# Patient Record
Sex: MALE | Race: WHITE | Employment: OTHER | URBAN - METROPOLITAN AREA
[De-identification: names, ages, dates, MRNs, and addresses within clinical notes are randomized per-mention and may not be internally consistent; named-entity substitution may affect disease eponyms.]

---

## 2017-01-10 ENCOUNTER — TELEPHONE (OUTPATIENT)
Dept: FAMILY MEDICINE CLINIC | Age: 82
End: 2017-01-10

## 2017-01-10 PROBLEM — R04.0 EPISTAXIS: Status: ACTIVE | Noted: 2017-01-10

## 2017-01-11 ENCOUNTER — TELEPHONE (OUTPATIENT)
Dept: FAMILY MEDICINE CLINIC | Age: 82
End: 2017-01-11

## 2017-01-12 ENCOUNTER — OFFICE VISIT (OUTPATIENT)
Dept: FAMILY MEDICINE CLINIC | Age: 82
End: 2017-01-12

## 2017-01-12 VITALS
DIASTOLIC BLOOD PRESSURE: 68 MMHG | SYSTOLIC BLOOD PRESSURE: 124 MMHG | BODY MASS INDEX: 21.22 KG/M2 | HEIGHT: 70 IN | RESPIRATION RATE: 16 BRPM | HEART RATE: 62 BPM | WEIGHT: 148.2 LBS

## 2017-01-12 DIAGNOSIS — I10 ESSENTIAL HYPERTENSION: ICD-10-CM

## 2017-01-12 DIAGNOSIS — R04.0 EPISTAXIS: ICD-10-CM

## 2017-01-12 DIAGNOSIS — J40 BRONCHITIS: Primary | ICD-10-CM

## 2017-01-12 DIAGNOSIS — I48.0 PAF (PAROXYSMAL ATRIAL FIBRILLATION) (HCC): ICD-10-CM

## 2017-01-12 PROCEDURE — 99214 OFFICE O/P EST MOD 30 MIN: CPT | Performed by: EMERGENCY MEDICINE

## 2017-01-12 ASSESSMENT — ENCOUNTER SYMPTOMS
TROUBLE SWALLOWING: 0
ABDOMINAL PAIN: 0
COUGH: 1
VOMITING: 0
CHEST TIGHTNESS: 0
CONSTIPATION: 0
RHINORRHEA: 0
WHEEZING: 0
NAUSEA: 0
SORE THROAT: 0
SINUS PRESSURE: 0
DIARRHEA: 0
SHORTNESS OF BREATH: 0
VOICE CHANGE: 0
BACK PAIN: 0

## 2017-01-13 ENCOUNTER — OFFICE VISIT (OUTPATIENT)
Dept: OTOLARYNGOLOGY | Age: 82
End: 2017-01-13

## 2017-01-13 VITALS
RESPIRATION RATE: 16 BRPM | HEART RATE: 84 BPM | SYSTOLIC BLOOD PRESSURE: 130 MMHG | BODY MASS INDEX: 21.32 KG/M2 | HEIGHT: 70 IN | TEMPERATURE: 97.6 F | DIASTOLIC BLOOD PRESSURE: 56 MMHG | WEIGHT: 148.9 LBS

## 2017-01-13 DIAGNOSIS — Z79.01 ANTICOAGULANT LONG-TERM USE: ICD-10-CM

## 2017-01-13 DIAGNOSIS — R04.0 EPISTAXIS: Primary | ICD-10-CM

## 2017-01-13 PROCEDURE — 1036F TOBACCO NON-USER: CPT | Performed by: PHYSICIAN ASSISTANT

## 2017-01-13 PROCEDURE — G8427 DOCREV CUR MEDS BY ELIG CLIN: HCPCS | Performed by: PHYSICIAN ASSISTANT

## 2017-01-13 PROCEDURE — 1123F ACP DISCUSS/DSCN MKR DOCD: CPT | Performed by: PHYSICIAN ASSISTANT

## 2017-01-13 PROCEDURE — 30901 CONTROL OF NOSEBLEED: CPT | Performed by: PHYSICIAN ASSISTANT

## 2017-01-13 PROCEDURE — G8419 CALC BMI OUT NRM PARAM NOF/U: HCPCS | Performed by: PHYSICIAN ASSISTANT

## 2017-01-13 PROCEDURE — 99214 OFFICE O/P EST MOD 30 MIN: CPT | Performed by: PHYSICIAN ASSISTANT

## 2017-01-13 PROCEDURE — 4040F PNEUMOC VAC/ADMIN/RCVD: CPT | Performed by: PHYSICIAN ASSISTANT

## 2017-01-13 PROCEDURE — G8484 FLU IMMUNIZE NO ADMIN: HCPCS | Performed by: PHYSICIAN ASSISTANT

## 2017-01-13 PROCEDURE — G8598 ASA/ANTIPLAT THER USED: HCPCS | Performed by: PHYSICIAN ASSISTANT

## 2017-01-13 ASSESSMENT — ENCOUNTER SYMPTOMS
COUGH: 0
TROUBLE SWALLOWING: 0
ABDOMINAL PAIN: 0
EYE DISCHARGE: 0
STRIDOR: 0
ANAL BLEEDING: 0
COLOR CHANGE: 0
SINUS PRESSURE: 0
APNEA: 0
BLOOD IN STOOL: 0
NAUSEA: 0
BACK PAIN: 0
DIARRHEA: 0
RHINORRHEA: 0
PHOTOPHOBIA: 0
EYE REDNESS: 0
SHORTNESS OF BREATH: 0
CHEST TIGHTNESS: 0
RECTAL PAIN: 0
EYE PAIN: 0
CHOKING: 0
WHEEZING: 0
SORE THROAT: 0
ABDOMINAL DISTENTION: 0
VOMITING: 0
FACIAL SWELLING: 0
EYE ITCHING: 0
CONSTIPATION: 0
VOICE CHANGE: 0

## 2017-01-27 ENCOUNTER — OFFICE VISIT (OUTPATIENT)
Dept: OTOLARYNGOLOGY | Age: 82
End: 2017-01-27

## 2017-01-27 VITALS
BODY MASS INDEX: 22.28 KG/M2 | HEART RATE: 78 BPM | RESPIRATION RATE: 16 BRPM | WEIGHT: 155.6 LBS | DIASTOLIC BLOOD PRESSURE: 68 MMHG | TEMPERATURE: 97.6 F | SYSTOLIC BLOOD PRESSURE: 116 MMHG

## 2017-01-27 DIAGNOSIS — R04.0 EPISTAXIS: Primary | ICD-10-CM

## 2017-01-27 DIAGNOSIS — Z79.01 ANTICOAGULANT LONG-TERM USE: ICD-10-CM

## 2017-01-27 PROCEDURE — 1123F ACP DISCUSS/DSCN MKR DOCD: CPT | Performed by: PHYSICIAN ASSISTANT

## 2017-01-27 PROCEDURE — G8598 ASA/ANTIPLAT THER USED: HCPCS | Performed by: PHYSICIAN ASSISTANT

## 2017-01-27 PROCEDURE — G8484 FLU IMMUNIZE NO ADMIN: HCPCS | Performed by: PHYSICIAN ASSISTANT

## 2017-01-27 PROCEDURE — 1036F TOBACCO NON-USER: CPT | Performed by: PHYSICIAN ASSISTANT

## 2017-01-27 PROCEDURE — 99213 OFFICE O/P EST LOW 20 MIN: CPT | Performed by: PHYSICIAN ASSISTANT

## 2017-01-27 PROCEDURE — 4040F PNEUMOC VAC/ADMIN/RCVD: CPT | Performed by: PHYSICIAN ASSISTANT

## 2017-01-27 PROCEDURE — G8419 CALC BMI OUT NRM PARAM NOF/U: HCPCS | Performed by: PHYSICIAN ASSISTANT

## 2017-01-27 PROCEDURE — G8427 DOCREV CUR MEDS BY ELIG CLIN: HCPCS | Performed by: PHYSICIAN ASSISTANT

## 2017-01-27 RX ORDER — SODIUM CHLORIDE/ALOE VERA
GEL (GRAM) NASAL PRN
Qty: 1 TUBE | Refills: 3 | Status: SHIPPED | OUTPATIENT
Start: 2017-01-27 | End: 2017-10-09 | Stop reason: SDUPTHER

## 2017-01-27 ASSESSMENT — ENCOUNTER SYMPTOMS
ANAL BLEEDING: 0
EYE PAIN: 0
NAUSEA: 0
BACK PAIN: 0
VOICE CHANGE: 0
EYE ITCHING: 0
FACIAL SWELLING: 0
DIARRHEA: 0
EYE REDNESS: 0
EYE DISCHARGE: 0
RHINORRHEA: 0
BLOOD IN STOOL: 0
COLOR CHANGE: 0
WHEEZING: 0
SINUS PRESSURE: 0
TROUBLE SWALLOWING: 0
VOMITING: 0
CHOKING: 0
STRIDOR: 0
ABDOMINAL PAIN: 0
SHORTNESS OF BREATH: 0
CHEST TIGHTNESS: 0
ABDOMINAL DISTENTION: 0
APNEA: 0
COUGH: 0
PHOTOPHOBIA: 0
SORE THROAT: 0
CONSTIPATION: 0
RECTAL PAIN: 0

## 2017-01-30 RX ORDER — NIFEDIPINE 90 MG/1
TABLET, EXTENDED RELEASE ORAL
Qty: 90 TABLET | Refills: 1 | Status: SHIPPED | OUTPATIENT
Start: 2017-01-30 | End: 2017-07-31 | Stop reason: SDUPTHER

## 2017-01-31 ENCOUNTER — OFFICE VISIT (OUTPATIENT)
Dept: FAMILY MEDICINE CLINIC | Age: 82
End: 2017-01-31

## 2017-01-31 VITALS
HEART RATE: 68 BPM | SYSTOLIC BLOOD PRESSURE: 138 MMHG | BODY MASS INDEX: 22.1 KG/M2 | DIASTOLIC BLOOD PRESSURE: 70 MMHG | WEIGHT: 154.38 LBS | HEIGHT: 70 IN | RESPIRATION RATE: 14 BRPM

## 2017-01-31 DIAGNOSIS — E78.5 HYPERLIPIDEMIA, UNSPECIFIED HYPERLIPIDEMIA TYPE: ICD-10-CM

## 2017-01-31 DIAGNOSIS — I10 ESSENTIAL HYPERTENSION: ICD-10-CM

## 2017-01-31 DIAGNOSIS — E08.22 DIABETES MELLITUS DUE TO UNDERLYING CONDITION WITH STAGE 4 CHRONIC KIDNEY DISEASE, WITHOUT LONG-TERM CURRENT USE OF INSULIN (HCC): Primary | ICD-10-CM

## 2017-01-31 DIAGNOSIS — N18.4 CKD (CHRONIC KIDNEY DISEASE) STAGE 4, GFR 15-29 ML/MIN (HCC): ICD-10-CM

## 2017-01-31 DIAGNOSIS — D64.9 ANEMIA, UNSPECIFIED TYPE: ICD-10-CM

## 2017-01-31 DIAGNOSIS — N18.4 DIABETES MELLITUS DUE TO UNDERLYING CONDITION WITH STAGE 4 CHRONIC KIDNEY DISEASE, WITHOUT LONG-TERM CURRENT USE OF INSULIN (HCC): Primary | ICD-10-CM

## 2017-01-31 LAB
GLUCOSE BLD-MCNC: 116 MG/DL
HBA1C MFR BLD: 6.1 %

## 2017-01-31 PROCEDURE — 82962 GLUCOSE BLOOD TEST: CPT | Performed by: EMERGENCY MEDICINE

## 2017-01-31 PROCEDURE — 99213 OFFICE O/P EST LOW 20 MIN: CPT | Performed by: EMERGENCY MEDICINE

## 2017-01-31 PROCEDURE — 83036 HEMOGLOBIN GLYCOSYLATED A1C: CPT | Performed by: EMERGENCY MEDICINE

## 2017-01-31 ASSESSMENT — ENCOUNTER SYMPTOMS
SORE THROAT: 0
WHEEZING: 0
BACK PAIN: 0
SINUS PRESSURE: 0
TROUBLE SWALLOWING: 0
VOICE CHANGE: 0
DIARRHEA: 0
COUGH: 0
CONSTIPATION: 0
CHEST TIGHTNESS: 0
RHINORRHEA: 0
SHORTNESS OF BREATH: 0
VOMITING: 0
NAUSEA: 0
ABDOMINAL PAIN: 0

## 2017-03-20 RX ORDER — CLONIDINE HYDROCHLORIDE 0.3 MG/1
0.3 TABLET ORAL 3 TIMES DAILY
Qty: 270 TABLET | Refills: 1 | Status: SHIPPED | OUTPATIENT
Start: 2017-03-20 | End: 2017-10-12 | Stop reason: SDUPTHER

## 2017-03-30 ENCOUNTER — OFFICE VISIT (OUTPATIENT)
Dept: CARDIOLOGY | Age: 82
End: 2017-03-30

## 2017-03-30 VITALS
DIASTOLIC BLOOD PRESSURE: 52 MMHG | WEIGHT: 153 LBS | BODY MASS INDEX: 21.9 KG/M2 | HEART RATE: 68 BPM | HEIGHT: 70 IN | SYSTOLIC BLOOD PRESSURE: 132 MMHG

## 2017-03-30 DIAGNOSIS — I48.0 PAF (PAROXYSMAL ATRIAL FIBRILLATION) (HCC): Primary | ICD-10-CM

## 2017-03-30 DIAGNOSIS — I10 ESSENTIAL HYPERTENSION: ICD-10-CM

## 2017-03-30 PROCEDURE — 99213 OFFICE O/P EST LOW 20 MIN: CPT | Performed by: INTERNAL MEDICINE

## 2017-03-30 PROCEDURE — 4040F PNEUMOC VAC/ADMIN/RCVD: CPT | Performed by: INTERNAL MEDICINE

## 2017-03-30 PROCEDURE — 1123F ACP DISCUSS/DSCN MKR DOCD: CPT | Performed by: INTERNAL MEDICINE

## 2017-03-30 PROCEDURE — 1036F TOBACCO NON-USER: CPT | Performed by: INTERNAL MEDICINE

## 2017-03-30 PROCEDURE — G8598 ASA/ANTIPLAT THER USED: HCPCS | Performed by: INTERNAL MEDICINE

## 2017-03-30 PROCEDURE — G8484 FLU IMMUNIZE NO ADMIN: HCPCS | Performed by: INTERNAL MEDICINE

## 2017-03-30 PROCEDURE — G8419 CALC BMI OUT NRM PARAM NOF/U: HCPCS | Performed by: INTERNAL MEDICINE

## 2017-03-30 PROCEDURE — G8427 DOCREV CUR MEDS BY ELIG CLIN: HCPCS | Performed by: INTERNAL MEDICINE

## 2017-04-06 ENCOUNTER — OFFICE VISIT (OUTPATIENT)
Dept: NEPHROLOGY | Age: 82
End: 2017-04-06

## 2017-04-06 VITALS
DIASTOLIC BLOOD PRESSURE: 70 MMHG | RESPIRATION RATE: 18 BRPM | BODY MASS INDEX: 22.33 KG/M2 | SYSTOLIC BLOOD PRESSURE: 164 MMHG | HEART RATE: 74 BPM | WEIGHT: 156 LBS

## 2017-04-06 DIAGNOSIS — E55.9 VITAMIN D DEFICIENCY: ICD-10-CM

## 2017-04-06 DIAGNOSIS — N18.4 CKD (CHRONIC KIDNEY DISEASE) STAGE 4, GFR 15-29 ML/MIN (HCC): Primary | ICD-10-CM

## 2017-04-06 DIAGNOSIS — N18.4 DIABETES MELLITUS DUE TO UNDERLYING CONDITION WITH STAGE 4 CHRONIC KIDNEY DISEASE, UNSPECIFIED LONG TERM INSULIN USE STATUS: ICD-10-CM

## 2017-04-06 DIAGNOSIS — E08.22 DIABETES MELLITUS DUE TO UNDERLYING CONDITION WITH STAGE 4 CHRONIC KIDNEY DISEASE, UNSPECIFIED LONG TERM INSULIN USE STATUS: ICD-10-CM

## 2017-04-06 DIAGNOSIS — I10 ESSENTIAL HYPERTENSION: ICD-10-CM

## 2017-04-06 DIAGNOSIS — E87.20 METABOLIC ACIDOSIS: ICD-10-CM

## 2017-04-06 PROCEDURE — 1036F TOBACCO NON-USER: CPT | Performed by: INTERNAL MEDICINE

## 2017-04-06 PROCEDURE — 4040F PNEUMOC VAC/ADMIN/RCVD: CPT | Performed by: INTERNAL MEDICINE

## 2017-04-06 PROCEDURE — 99214 OFFICE O/P EST MOD 30 MIN: CPT | Performed by: INTERNAL MEDICINE

## 2017-04-06 PROCEDURE — G8427 DOCREV CUR MEDS BY ELIG CLIN: HCPCS | Performed by: INTERNAL MEDICINE

## 2017-04-06 PROCEDURE — G8419 CALC BMI OUT NRM PARAM NOF/U: HCPCS | Performed by: INTERNAL MEDICINE

## 2017-04-06 PROCEDURE — G8598 ASA/ANTIPLAT THER USED: HCPCS | Performed by: INTERNAL MEDICINE

## 2017-04-06 PROCEDURE — 1123F ACP DISCUSS/DSCN MKR DOCD: CPT | Performed by: INTERNAL MEDICINE

## 2017-04-06 RX ORDER — SODIUM BICARBONATE 650 MG/1
650 TABLET ORAL 2 TIMES DAILY
Qty: 60 TABLET | Refills: 5 | Status: SHIPPED | OUTPATIENT
Start: 2017-04-06 | End: 2017-11-09 | Stop reason: ALTCHOICE

## 2017-04-10 RX ORDER — METOPROLOL SUCCINATE 100 MG/1
50 TABLET, EXTENDED RELEASE ORAL DAILY PRN
Qty: 45 TABLET | Refills: 2 | Status: SHIPPED | OUTPATIENT
Start: 2017-04-10 | End: 2017-10-12 | Stop reason: SDUPTHER

## 2017-04-10 RX ORDER — METOPROLOL SUCCINATE 100 MG/1
TABLET, EXTENDED RELEASE ORAL
Qty: 90 TABLET | Refills: 0 | OUTPATIENT
Start: 2017-04-10

## 2017-05-02 ENCOUNTER — OFFICE VISIT (OUTPATIENT)
Dept: FAMILY MEDICINE CLINIC | Age: 82
End: 2017-05-02

## 2017-05-02 VITALS
RESPIRATION RATE: 14 BRPM | WEIGHT: 155.8 LBS | HEIGHT: 70 IN | BODY MASS INDEX: 22.3 KG/M2 | DIASTOLIC BLOOD PRESSURE: 70 MMHG | SYSTOLIC BLOOD PRESSURE: 130 MMHG | HEART RATE: 46 BPM

## 2017-05-02 DIAGNOSIS — E78.5 HYPERLIPIDEMIA, UNSPECIFIED HYPERLIPIDEMIA TYPE: ICD-10-CM

## 2017-05-02 DIAGNOSIS — N18.4 DIABETES MELLITUS DUE TO UNDERLYING CONDITION WITH STAGE 4 CHRONIC KIDNEY DISEASE, UNSPECIFIED LONG TERM INSULIN USE STATUS: Primary | ICD-10-CM

## 2017-05-02 DIAGNOSIS — I10 ESSENTIAL HYPERTENSION: ICD-10-CM

## 2017-05-02 DIAGNOSIS — E08.22 DIABETES MELLITUS DUE TO UNDERLYING CONDITION WITH STAGE 4 CHRONIC KIDNEY DISEASE, UNSPECIFIED LONG TERM INSULIN USE STATUS: Primary | ICD-10-CM

## 2017-05-02 DIAGNOSIS — N25.81 HYPERPARATHYROIDISM DUE TO RENAL INSUFFICIENCY (HCC): ICD-10-CM

## 2017-05-02 DIAGNOSIS — N18.4 CKD (CHRONIC KIDNEY DISEASE) STAGE 4, GFR 15-29 ML/MIN (HCC): ICD-10-CM

## 2017-05-02 PROBLEM — E55.9 VITAMIN D DEFICIENCY: Status: RESOLVED | Noted: 2017-04-06 | Resolved: 2017-05-02

## 2017-05-02 LAB
GLUCOSE BLD-MCNC: 181 MG/DL
HBA1C MFR BLD: 5.7 %

## 2017-05-02 PROCEDURE — 99213 OFFICE O/P EST LOW 20 MIN: CPT | Performed by: EMERGENCY MEDICINE

## 2017-05-02 PROCEDURE — 83036 HEMOGLOBIN GLYCOSYLATED A1C: CPT | Performed by: EMERGENCY MEDICINE

## 2017-05-02 PROCEDURE — 82962 GLUCOSE BLOOD TEST: CPT | Performed by: EMERGENCY MEDICINE

## 2017-05-02 ASSESSMENT — ENCOUNTER SYMPTOMS
BACK PAIN: 0
WHEEZING: 0
VOICE CHANGE: 0
NAUSEA: 0
SORE THROAT: 0
CONSTIPATION: 0
DIARRHEA: 0
VOMITING: 0
CHEST TIGHTNESS: 0
ABDOMINAL PAIN: 0
SINUS PRESSURE: 0
RHINORRHEA: 0
COUGH: 0
SHORTNESS OF BREATH: 0
TROUBLE SWALLOWING: 0

## 2017-05-02 ASSESSMENT — PATIENT HEALTH QUESTIONNAIRE - PHQ9
1. LITTLE INTEREST OR PLEASURE IN DOING THINGS: 0
SUM OF ALL RESPONSES TO PHQ QUESTIONS 1-9: 0
2. FEELING DOWN, DEPRESSED OR HOPELESS: 0
SUM OF ALL RESPONSES TO PHQ9 QUESTIONS 1 & 2: 0

## 2017-05-16 ENCOUNTER — OFFICE VISIT (OUTPATIENT)
Dept: FAMILY MEDICINE CLINIC | Age: 82
End: 2017-05-16

## 2017-05-16 VITALS
TEMPERATURE: 98.1 F | SYSTOLIC BLOOD PRESSURE: 132 MMHG | RESPIRATION RATE: 16 BRPM | BODY MASS INDEX: 22.05 KG/M2 | HEIGHT: 70 IN | WEIGHT: 154 LBS | DIASTOLIC BLOOD PRESSURE: 84 MMHG | HEART RATE: 64 BPM

## 2017-05-16 DIAGNOSIS — J20.9 ACUTE BRONCHITIS, UNSPECIFIED ORGANISM: ICD-10-CM

## 2017-05-16 DIAGNOSIS — J44.1 COPD WITH EXACERBATION (HCC): Primary | ICD-10-CM

## 2017-05-16 PROCEDURE — 99213 OFFICE O/P EST LOW 20 MIN: CPT | Performed by: EMERGENCY MEDICINE

## 2017-05-16 RX ORDER — CEFUROXIME AXETIL 500 MG/1
500 TABLET ORAL 2 TIMES DAILY
Qty: 20 TABLET | Refills: 0 | Status: SHIPPED | OUTPATIENT
Start: 2017-05-16 | End: 2017-05-26

## 2017-05-16 RX ORDER — AZITHROMYCIN 250 MG/1
TABLET, FILM COATED ORAL
Qty: 1 PACKET | Refills: 0 | Status: SHIPPED | OUTPATIENT
Start: 2017-05-16 | End: 2017-05-26

## 2017-05-16 ASSESSMENT — ENCOUNTER SYMPTOMS
SORE THROAT: 1
COUGH: 1
RHINORRHEA: 1

## 2017-06-19 RX ORDER — GLIMEPIRIDE 1 MG/1
TABLET ORAL
Qty: 90 TABLET | Refills: 0 | Status: SHIPPED | OUTPATIENT
Start: 2017-06-19 | End: 2017-10-12 | Stop reason: SDUPTHER

## 2017-07-28 ENCOUNTER — TELEPHONE (OUTPATIENT)
Dept: FAMILY MEDICINE CLINIC | Age: 82
End: 2017-07-28

## 2017-07-28 RX ORDER — AZITHROMYCIN 250 MG/1
TABLET, FILM COATED ORAL
Qty: 1 PACKET | Refills: 0 | Status: SHIPPED | OUTPATIENT
Start: 2017-07-28 | End: 2017-08-07

## 2017-07-31 RX ORDER — NIFEDIPINE 90 MG/1
TABLET, EXTENDED RELEASE ORAL
Qty: 30 TABLET | Refills: 0 | Status: SHIPPED | OUTPATIENT
Start: 2017-07-31 | End: 2017-07-31 | Stop reason: SDUPTHER

## 2017-08-01 RX ORDER — NIFEDIPINE 90 MG/1
TABLET, EXTENDED RELEASE ORAL
Qty: 30 TABLET | Refills: 0 | Status: SHIPPED | OUTPATIENT
Start: 2017-08-01 | End: 2017-09-05 | Stop reason: SDUPTHER

## 2017-08-08 ENCOUNTER — OFFICE VISIT (OUTPATIENT)
Dept: FAMILY MEDICINE CLINIC | Age: 82
End: 2017-08-08

## 2017-08-08 VITALS
RESPIRATION RATE: 14 BRPM | BODY MASS INDEX: 22.39 KG/M2 | DIASTOLIC BLOOD PRESSURE: 62 MMHG | HEIGHT: 70 IN | WEIGHT: 156.4 LBS | SYSTOLIC BLOOD PRESSURE: 134 MMHG | HEART RATE: 62 BPM

## 2017-08-08 DIAGNOSIS — E78.5 HYPERLIPIDEMIA, UNSPECIFIED HYPERLIPIDEMIA TYPE: ICD-10-CM

## 2017-08-08 DIAGNOSIS — I10 ESSENTIAL HYPERTENSION: ICD-10-CM

## 2017-08-08 DIAGNOSIS — I73.9 PERIPHERAL VASCULAR DISEASE (HCC): ICD-10-CM

## 2017-08-08 DIAGNOSIS — N18.4 DIABETES MELLITUS DUE TO UNDERLYING CONDITION WITH STAGE 4 CHRONIC KIDNEY DISEASE, UNSPECIFIED LONG TERM INSULIN USE STATUS: Primary | ICD-10-CM

## 2017-08-08 DIAGNOSIS — E08.22 DIABETES MELLITUS DUE TO UNDERLYING CONDITION WITH STAGE 4 CHRONIC KIDNEY DISEASE, UNSPECIFIED LONG TERM INSULIN USE STATUS: Primary | ICD-10-CM

## 2017-08-08 LAB
GLUCOSE BLD-MCNC: 230 MG/DL
HBA1C MFR BLD: 5.8 %

## 2017-08-08 PROCEDURE — 99213 OFFICE O/P EST LOW 20 MIN: CPT | Performed by: EMERGENCY MEDICINE

## 2017-08-08 PROCEDURE — 82962 GLUCOSE BLOOD TEST: CPT | Performed by: EMERGENCY MEDICINE

## 2017-08-08 PROCEDURE — 83036 HEMOGLOBIN GLYCOSYLATED A1C: CPT | Performed by: EMERGENCY MEDICINE

## 2017-08-08 ASSESSMENT — ENCOUNTER SYMPTOMS
WHEEZING: 0
SORE THROAT: 0
VOMITING: 0
NAUSEA: 0
CONSTIPATION: 0
DIARRHEA: 0
SHORTNESS OF BREATH: 0
COUGH: 0
RHINORRHEA: 0
BACK PAIN: 0
TROUBLE SWALLOWING: 0
ABDOMINAL PAIN: 0
SINUS PRESSURE: 0
VOICE CHANGE: 0
CHEST TIGHTNESS: 0

## 2017-08-08 ASSESSMENT — PATIENT HEALTH QUESTIONNAIRE - PHQ9
1. LITTLE INTEREST OR PLEASURE IN DOING THINGS: 0
SUM OF ALL RESPONSES TO PHQ9 QUESTIONS 1 & 2: 0
SUM OF ALL RESPONSES TO PHQ QUESTIONS 1-9: 0
2. FEELING DOWN, DEPRESSED OR HOPELESS: 0

## 2017-08-17 ASSESSMENT — ENCOUNTER SYMPTOMS
VOICE CHANGE: 0
DIARRHEA: 0
TROUBLE SWALLOWING: 0
ABDOMINAL PAIN: 0
WHEEZING: 0
CONSTIPATION: 0
SHORTNESS OF BREATH: 0
CHEST TIGHTNESS: 0
VOMITING: 0
SINUS PRESSURE: 0
NAUSEA: 0
BACK PAIN: 0

## 2017-08-29 RX ORDER — NIFEDIPINE 90 MG/1
TABLET, EXTENDED RELEASE ORAL
Qty: 30 TABLET | Refills: 5 | Status: SHIPPED | OUTPATIENT
Start: 2017-08-29 | End: 2018-02-13 | Stop reason: SDUPTHER

## 2017-09-05 ENCOUNTER — OFFICE VISIT (OUTPATIENT)
Dept: FAMILY MEDICINE CLINIC | Age: 82
End: 2017-09-05

## 2017-09-05 VITALS
BODY MASS INDEX: 22.08 KG/M2 | RESPIRATION RATE: 14 BRPM | HEIGHT: 70 IN | WEIGHT: 154.25 LBS | DIASTOLIC BLOOD PRESSURE: 60 MMHG | HEART RATE: 72 BPM | SYSTOLIC BLOOD PRESSURE: 124 MMHG

## 2017-09-05 DIAGNOSIS — J40 BRONCHITIS: Primary | ICD-10-CM

## 2017-09-05 PROCEDURE — 99213 OFFICE O/P EST LOW 20 MIN: CPT | Performed by: EMERGENCY MEDICINE

## 2017-09-05 RX ORDER — AZITHROMYCIN 250 MG/1
TABLET, FILM COATED ORAL
Qty: 1 PACKET | Refills: 0 | Status: SHIPPED | OUTPATIENT
Start: 2017-09-05 | End: 2017-09-15

## 2017-09-05 RX ORDER — CEFUROXIME AXETIL 250 MG/1
250 TABLET ORAL 2 TIMES DAILY
Qty: 20 TABLET | Refills: 0 | Status: SHIPPED | OUTPATIENT
Start: 2017-09-05 | End: 2017-09-15

## 2017-09-05 ASSESSMENT — ENCOUNTER SYMPTOMS
VOMITING: 0
NAUSEA: 0
COUGH: 1
SORE THROAT: 1
SINUS PAIN: 1
ABDOMINAL PAIN: 0
TROUBLE SWALLOWING: 0
SHORTNESS OF BREATH: 0
BACK PAIN: 0
VOICE CHANGE: 0
CONSTIPATION: 0
RHINORRHEA: 1
CHEST TIGHTNESS: 0
SINUS PRESSURE: 0
WHEEZING: 0
DIARRHEA: 0

## 2017-09-13 ENCOUNTER — TELEPHONE (OUTPATIENT)
Dept: FAMILY MEDICINE CLINIC | Age: 82
End: 2017-09-13

## 2017-09-13 RX ORDER — BENZONATATE 100 MG/1
100 CAPSULE ORAL 3 TIMES DAILY PRN
Qty: 30 CAPSULE | Refills: 0 | Status: SHIPPED | OUTPATIENT
Start: 2017-09-13 | End: 2017-09-20

## 2017-09-15 RX ORDER — ATORVASTATIN CALCIUM 10 MG/1
TABLET, FILM COATED ORAL
Qty: 90 TABLET | Refills: 0 | Status: SHIPPED | OUTPATIENT
Start: 2017-09-15 | End: 2017-10-12 | Stop reason: SDUPTHER

## 2017-10-05 ENCOUNTER — OFFICE VISIT (OUTPATIENT)
Dept: FAMILY MEDICINE CLINIC | Age: 82
End: 2017-10-05

## 2017-10-05 VITALS
HEIGHT: 70 IN | BODY MASS INDEX: 21.93 KG/M2 | SYSTOLIC BLOOD PRESSURE: 132 MMHG | HEART RATE: 58 BPM | WEIGHT: 153.2 LBS | DIASTOLIC BLOOD PRESSURE: 52 MMHG | TEMPERATURE: 98.1 F | RESPIRATION RATE: 12 BRPM

## 2017-10-05 DIAGNOSIS — J45.909 ASTHMATIC BRONCHITIS, UNSPECIFIED ASTHMA SEVERITY, UNCOMPLICATED: Primary | ICD-10-CM

## 2017-10-05 DIAGNOSIS — I10 ESSENTIAL HYPERTENSION: ICD-10-CM

## 2017-10-05 PROCEDURE — 99213 OFFICE O/P EST LOW 20 MIN: CPT | Performed by: EMERGENCY MEDICINE

## 2017-10-05 RX ORDER — PREDNISONE 10 MG/1
TABLET ORAL
Qty: 15 TABLET | Refills: 0 | Status: SHIPPED | OUTPATIENT
Start: 2017-10-05 | End: 2017-11-09 | Stop reason: ALTCHOICE

## 2017-10-05 RX ORDER — LEVOFLOXACIN 250 MG/1
250 TABLET ORAL DAILY
Qty: 10 TABLET | Refills: 0 | Status: SHIPPED | OUTPATIENT
Start: 2017-10-05 | End: 2017-10-12 | Stop reason: SDUPTHER

## 2017-10-05 RX ORDER — ALBUTEROL SULFATE 90 UG/1
AEROSOL, METERED RESPIRATORY (INHALATION)
Qty: 1 INHALER | Refills: 0 | Status: SHIPPED | OUTPATIENT
Start: 2017-10-05 | End: 2017-10-09 | Stop reason: SDUPTHER

## 2017-10-05 ASSESSMENT — ENCOUNTER SYMPTOMS
SORE THROAT: 0
ABDOMINAL PAIN: 0
NAUSEA: 0
WHEEZING: 1
COUGH: 1
VOICE CHANGE: 0
SINUS PRESSURE: 0
CONSTIPATION: 0
DIARRHEA: 0
TROUBLE SWALLOWING: 0
RHINORRHEA: 0
SHORTNESS OF BREATH: 0
BACK PAIN: 0
VOMITING: 0
CHEST TIGHTNESS: 0

## 2017-10-05 NOTE — PROGRESS NOTES
Visit Date: 10/5/2017    Wicho Brannon is a 80 y.o. male who presents today for:  Chief Complaint   Patient presents with    Cough         HPI:     HPI    Still coughing, Had Bronchitis last 9/5/17 and was give Ceftin and Zithromax. the patient did better at that time however the cough persisted. The patient had been exposed to their grandchildren who were sick at that time    Still coughing yellow phlegm.  No fever      Current Medications:  Current Outpatient Prescriptions   Medication Sig Dispense Refill    levofloxacin (LEVAQUIN) 250 MG tablet Take 1 tablet by mouth daily for 10 days 10 tablet 0    albuterol sulfate  (90 Base) MCG/ACT inhaler 2 puffs every 4-6 hours with spacer for wheezing and chest congestionwhen necessary 1 Inhaler 0    Spacer/Aero Chamber Mouthpiece MISC 1 each by Does not apply route once as needed (user with Inhaler) 1 each 0    predniSONE (DELTASONE) 10 MG tablet 2 tablets 2 times a day for 2 days then 1  Tablet 2 times a day for 2 days, then 1 tablet daily till gone 15 tablet 0    atorvastatin (LIPITOR) 10 MG tablet TAKE 1 TABLET BY MOUTH EVERY DAY 90 tablet 0    NIFEdipine (PROCARDIA XL) 90 MG extended release tablet TAKE 1 TABLET BY MOUTH DAILY 30 tablet 5    glimepiride (AMARYL) 1 MG tablet TAKE 1 TABLET BY MOUTH DAILY 90 tablet 0    metoprolol succinate (TOPROL XL) 100 MG extended release tablet Take 0.5 tablets by mouth daily as needed (daily PRN) 45 tablet 2    sodium bicarbonate 650 MG tablet Take 1 tablet by mouth 2 times daily 60 tablet 5    cloNIDine (CATAPRES) 0.3 MG tablet Take 1 tablet by mouth 3 times daily 270 tablet 1    saline nasal gel (AYR) GEL by Nasal route as needed for Congestion 1 Tube 3    albuterol sulfate  (90 BASE) MCG/ACT inhaler 2 puffs every 4-6 hours with spacer for wheezing and chest congestionwhen necessary 1 Inhaler 0    ferrous sulfate 325 (65 FE) MG tablet Take 1 tablet by mouth 2 times daily 100 tablet 2    terazosin 154 lb 4 oz (70 kg)   17 156 lb 6.4 oz (70.9 kg)       Physical Exam   Constitutional: He is oriented to person, place, and time. He appears well-developed and well-nourished. He is cooperative. HENT:   Head: Normocephalic and atraumatic. Right Ear: External ear normal.   Left Ear: External ear normal.   Nose: Nose normal.   Mouth/Throat: Oropharynx is clear and moist.   Eyes: Conjunctivae and EOM are normal. Pupils are equal, round, and reactive to light. No scleral icterus. Neck: Normal range of motion. Neck supple. No JVD present. No thyromegaly present. Cardiovascular: Normal rate, regular rhythm and intact distal pulses. Exam reveals no friction rub. No murmur heard. Pulmonary/Chest: Effort normal. He has wheezes. He has rhonchi. He has no rales. He exhibits no tenderness. Abdominal: Soft. Bowel sounds are normal. He exhibits no mass. There is no tenderness. Musculoskeletal: He exhibits no edema. Lymphadenopathy:     He has no cervical adenopathy. Neurological: He is alert and oriented to person, place, and time. Skin: No rash noted. Vitals reviewed. Assessment:      1. Asthmatic bronchitis, unspecified asthma severity, uncomplicated     2.  Essential hypertension         Plan:      Medications Prescribed:  Orders Placed This Encounter   Medications    levofloxacin (LEVAQUIN) 250 MG tablet     Sig: Take 1 tablet by mouth daily for 10 days     Dispense:  10 tablet     Refill:  0    albuterol sulfate  (90 Base) MCG/ACT inhaler     Si puffs every 4-6 hours with spacer for wheezing and chest congestionwhen necessary     Dispense:  1 Inhaler     Refill:  0    Spacer/Aero Chamber Mouthpiece MISC     Si each by Does not apply route once as needed (user with Inhaler)     Dispense:  1 each     Refill:  0    predniSONE (DELTASONE) 10 MG tablet     Si tablets 2 times a day for 2 days then 1  Tablet 2 times a day for 2 days, then 1 tablet daily till gone Dispense:  15 tablet     Refill:  0       Orders Placed:  No orders of the defined types were placed in this encounter. Lab Results   Component Value Date    LABA1C 5.8 08/08/2017    LABA1C 5.7 05/02/2017    LABA1C 6.1 01/31/2017     Lab Results   Component Value Date    LABMICR 41.43 01/28/2016    LDLCALC 44 10/18/2016    CREATININE 2.8 (H) 03/31/2017       Return in about 1 week (around 10/12/2017) for Bronchitis. Discussed use, benefit, and side effects of prescribed medications. All patient questions answered. Pt voiced understanding. Instructed to continue current medications, diet and exercise. Patient agreed with treatment plan.

## 2017-10-05 NOTE — MR AVS SNAPSHOT
* Notice: This list has 2 medication(s) that are the same as other medications prescribed for you. Read the directions carefully, and ask your doctor or other care provider to review them with you.          Where to Get Your Medications      These medications were sent to  [facility] [place] [place] - [address] [phone]  [address] [facility] [address], [place] [address]    Hours:  24-hours Phone:  [phone]     albuterol sulfate  (90 Base) MCG/ACT inhaler    levofloxacin 250 MG tablet    predniSONE 10 MG tablet    Spacer/Aero Chamber Mouthpiece Misc               Your Current Medications Are              levofloxacin (LEVAQUIN) 250 MG tablet Take 1 tablet by mouth daily for 10 days    albuterol sulfate  (90 Base) MCG/ACT inhaler 2 puffs every 4-6 hours with spacer for wheezing and chest congestionwhen necessary    Spacer/Aero Chamber Mouthpiece MISC 1 each by Does not apply route once as needed (user with Inhaler)    predniSONE (DELTASONE) 10 MG tablet 2 tablets 2 times a day for 2 days then 1  Tablet 2 times a day for 2 days, then 1 tablet daily till gone    atorvastatin (LIPITOR) 10 MG tablet TAKE 1 TABLET BY MOUTH EVERY DAY    NIFEdipine (PROCARDIA XL) 90 MG extended release tablet TAKE 1 TABLET BY MOUTH DAILY    glimepiride (AMARYL) 1 MG tablet TAKE 1 TABLET BY MOUTH DAILY    metoprolol succinate (TOPROL XL) 100 MG extended release tablet Take 0.5 tablets by mouth daily as needed (daily PRN)    sodium bicarbonate 650 MG tablet Take 1 tablet by mouth 2 times daily    cloNIDine (CATAPRES) 0.3 MG tablet Take 1 tablet by mouth 3 times daily    saline nasal gel (AYR) GEL by Nasal route as needed for Congestion    albuterol sulfate  (90 BASE) MCG/ACT inhaler 2 puffs every 4-6 hours with spacer for wheezing and chest congestionwhen necessary    ferrous sulfate 325 (65 FE) MG tablet Take 1 tablet by mouth 2 times daily terazosin (HYTRIN) 5 MG capsule TAKE ONE CAPSULE BY MOUTH TWICE DAILY    isosorbide dinitrate (ISORDIL) 10 MG tablet TAKE 1 TABLET BY MOUTH THREE TIMES DAILY    ONETOUCH DELICA LANCETS 46L MISC Check blood sugar once daily. Dx: 250.00    oxyCODONE-acetaminophen (PERCOCET)  MG per tablet 1 tablet 3 times a day as needed for pain    glucose blood VI test strips (ONETOUCH VERIO) strip Check blood sugar once daily Dx: 250.00    Cholecalciferol (VITAMIN D PO)   Take 400 Units by mouth daily     vitamin E 400 UNIT capsule Take 1 capsule by mouth daily. aspirin 81 MG chewable tablet Take 81 mg by mouth daily.         Allergies              Dye [Iodides]     Renal insufficiency         Additional Information        Basic Information     Date Of Birth Sex Race Ethnicity Preferred Language    9/15/1933 Male White Non-/Non  English      Problem List as of 10/5/2017  Date Reviewed: 4/6/2017                Epistaxis    Metabolic acidosis    Vitamin D deficiency    Diabetes mellitus with renal manifestation (HCC)    Essential hypertension    PAF (paroxysmal atrial fibrillation) ([facility])    CKD (chronic kidney disease) stage 4, GFR 15-29 ml/min (HCC)    PAD (peripheral artery disease) (HCC)    CAD (coronary artery disease)    BPH (benign prostatic hyperplasia)    Hyperlipidemia      Your Goals as of 10/5/2017 at 11:45 AM              8/8/17 5/2/17 1/31/17       Result Component    HEMOGLOBIN A1C < 7.0   5.8  5.7  6.1    LDL CALC < 130             Immunizations as of 10/5/2017     Name Date    Influenza Virus Vaccine 10/6/2015, 10/29/2014, 11/19/2013, 9/27/2012, 9/8/2011    Influenza, Tara Rausch, 3 yrs and older, IM, Preservative Free 10/25/2016    Pneumococcal 13-valent Conjugate ([id number]) 10/7/2015    Pneumococcal Polysaccharide ([id number]) 9/8/2011      Preventive Care        Date Due    Tetanus Combination Vaccine (1 - Tdap) 9/15/1952    Yearly Flu Vaccine (1) 9/1/2017            MyChart Signup Our records indicate that you have declined MyChart signup.

## 2017-10-06 ENCOUNTER — HOSPITAL ENCOUNTER (OUTPATIENT)
Age: 82
Discharge: HOME | End: 2017-10-06
Payer: MEDICARE

## 2017-10-06 DIAGNOSIS — N18.4 CKD (CHRONIC KIDNEY DISEASE) STAGE 4, GFR 15-29 ML/MIN (HCC): ICD-10-CM

## 2017-10-06 DIAGNOSIS — E55.9 VITAMIN D DEFICIENCY: ICD-10-CM

## 2017-10-06 LAB
ANION GAP SERPL CALCULATED.3IONS-SCNC: 15 MEQ/L (ref 8–16)
BUN BLDV-MCNC: 48 MG/DL (ref 7–22)
CALCIUM SERPL-MCNC: 9.2 MG/DL (ref 8.5–10.5)
CHLORIDE BLD-SCNC: 105 MEQ/L (ref 98–111)
CO2: 23 MEQ/L (ref 23–33)
CREAT SERPL-MCNC: 2.9 MG/DL (ref 0.4–1.2)
GFR SERPL CREATININE-BSD FRML MDRD: 21 ML/MIN/1.73M2
GLUCOSE BLD-MCNC: 151 MG/DL (ref 70–108)
PHOSPHORUS: 3.8 MG/DL (ref 2.4–4.7)
POTASSIUM SERPL-SCNC: 4.7 MEQ/L (ref 3.5–5.2)
PTH INTACT: 186.5 PG/ML (ref 15–65)
SODIUM BLD-SCNC: 143 MEQ/L (ref 135–145)
VITAMIN D 25-HYDROXY: 39 NG/ML (ref 30–100)

## 2017-10-06 PROCEDURE — 82306 VITAMIN D 25 HYDROXY: CPT

## 2017-10-06 PROCEDURE — 84100 ASSAY OF PHOSPHORUS: CPT

## 2017-10-06 PROCEDURE — 36415 COLL VENOUS BLD VENIPUNCTURE: CPT

## 2017-10-06 PROCEDURE — 83970 ASSAY OF PARATHORMONE: CPT

## 2017-10-06 PROCEDURE — 80048 BASIC METABOLIC PNL TOTAL CA: CPT

## 2017-10-09 ENCOUNTER — OFFICE VISIT (OUTPATIENT)
Dept: NEPHROLOGY | Age: 82
End: 2017-10-09
Payer: MEDICARE

## 2017-10-09 VITALS
SYSTOLIC BLOOD PRESSURE: 128 MMHG | WEIGHT: 154 LBS | BODY MASS INDEX: 22.1 KG/M2 | DIASTOLIC BLOOD PRESSURE: 84 MMHG | RESPIRATION RATE: 16 BRPM | HEART RATE: 68 BPM

## 2017-10-09 DIAGNOSIS — N18.4 DIABETES MELLITUS DUE TO UNDERLYING CONDITION WITH STAGE 4 CHRONIC KIDNEY DISEASE, UNSPECIFIED LONG TERM INSULIN USE STATUS: ICD-10-CM

## 2017-10-09 DIAGNOSIS — E08.22 DIABETES MELLITUS DUE TO UNDERLYING CONDITION WITH STAGE 4 CHRONIC KIDNEY DISEASE, UNSPECIFIED LONG TERM INSULIN USE STATUS: ICD-10-CM

## 2017-10-09 DIAGNOSIS — E55.9 VITAMIN D DEFICIENCY: ICD-10-CM

## 2017-10-09 DIAGNOSIS — I10 ESSENTIAL HYPERTENSION: ICD-10-CM

## 2017-10-09 DIAGNOSIS — N18.4 CKD (CHRONIC KIDNEY DISEASE) STAGE 4, GFR 15-29 ML/MIN (HCC): Primary | ICD-10-CM

## 2017-10-09 PROCEDURE — G8598 ASA/ANTIPLAT THER USED: HCPCS | Performed by: INTERNAL MEDICINE

## 2017-10-09 PROCEDURE — G8484 FLU IMMUNIZE NO ADMIN: HCPCS | Performed by: INTERNAL MEDICINE

## 2017-10-09 PROCEDURE — 4040F PNEUMOC VAC/ADMIN/RCVD: CPT | Performed by: INTERNAL MEDICINE

## 2017-10-09 PROCEDURE — G8420 CALC BMI NORM PARAMETERS: HCPCS | Performed by: INTERNAL MEDICINE

## 2017-10-09 PROCEDURE — G8427 DOCREV CUR MEDS BY ELIG CLIN: HCPCS | Performed by: INTERNAL MEDICINE

## 2017-10-09 PROCEDURE — 99213 OFFICE O/P EST LOW 20 MIN: CPT | Performed by: INTERNAL MEDICINE

## 2017-10-09 PROCEDURE — 1123F ACP DISCUSS/DSCN MKR DOCD: CPT | Performed by: INTERNAL MEDICINE

## 2017-10-09 PROCEDURE — 1036F TOBACCO NON-USER: CPT | Performed by: INTERNAL MEDICINE

## 2017-10-09 NOTE — PROGRESS NOTES
Renal Progress Note    Assessment and Plan:     1. CKD (chronic kidney disease) stage 4, GFR 15-29 ml/min ([facility])     2. Essential hypertension     3. Diabetes mellitus due to underlying condition with stage 4 chronic kidney disease, unspecified long term insulin use status     4. Vitamin D deficiency       PLAN:  Labs reviewed with the patient and spouse. They understood. Serum creatinine is mostly stable at 2.9 mg/dL  Vitamin D level is normal  PTH is slightly increased  Medications reviewed  No changes  No treatment is necessary for a slightly elevated PTH level  Return visit in 6 months with labs    Patient Active Problem List   Diagnosis    Hyperlipidemia    BPH (benign prostatic hyperplasia)    CAD (coronary artery disease)    PAD (peripheral artery disease) ([facility])    CKD (chronic kidney disease) stage 4, GFR 15-29 ml/min ([facility])    [facility]    Essential hypertension    Diabetes mellitus with renal manifestation ([facility])    Vitamin D deficiency    Epistaxis    Metabolic acidosis       Subjective:   Chief complaint:  Chief Complaint   Patient presents with    6 Month Follow-Up    Chronic Kidney Disease     Stage 4      HPI:This is a follow up visit for Mr. Angelica Adams who is here today for return appointment. I see him for chronic kidney disease. He was last seen about 6 months ago. Serum creatinine was 2.8 mg/dL. This time it is 2.9 mg/dL. He had a recent bronchitis. He is being treated with a combination of prednisone and levofloxacin. Home blood pressure is good.     ROS:Constitutional: negative  Eyes: negative  Ears, nose, mouth, throat, and face: negative  Respiratory: negative  Cardiovascular: negative  Gastrointestinal: negative  Genitourinary:negative  Integument/breast: negative  Hematologic/lymphatic: negative  Musculoskeletal:positive for arthralgias and stiff joints  Neurological: positive for coordination problems and gait problems  Behavioral/Psych: negative  Endocrine: negative  Allergic/Immunologic: negative  Medications:     Current Outpatient Prescriptions   Medication Sig Dispense Refill    levofloxacin (LEVAQUIN) 250 MG tablet Take 1 tablet by mouth daily for 10 days 10 tablet 0    predniSONE (DELTASONE) 10 MG tablet 2 tablets 2 times a day for 2 days then 1  Tablet 2 times a day for 2 days, then 1 tablet daily till gone 15 tablet 0    atorvastatin (LIPITOR) 10 MG tablet TAKE 1 TABLET BY MOUTH EVERY DAY 90 tablet 0    NIFEdipine (PROCARDIA XL) 90 MG extended release tablet TAKE 1 TABLET BY MOUTH DAILY 30 tablet 5    glimepiride (AMARYL) 1 MG tablet TAKE 1 TABLET BY MOUTH DAILY 90 tablet 0    metoprolol succinate (TOPROL XL) 100 MG extended release tablet Take 0.5 tablets by mouth daily as needed (daily PRN) 45 tablet 2    sodium bicarbonate 650 MG tablet Take 1 tablet by mouth 2 times daily 60 tablet 5    cloNIDine (CATAPRES) 0.3 MG tablet Take 1 tablet by mouth 3 times daily 270 tablet 1    terazosin (HYTRIN) 5 MG capsule TAKE ONE CAPSULE BY MOUTH TWICE DAILY 180 capsule 1    oxyCODONE-acetaminophen (PERCOCET)  MG per tablet 1 tablet 3 times a day as needed for pain 60 tablet 0    Cholecalciferol (VITAMIN D PO) Take 2,000 Units by mouth daily       vitamin E 400 UNIT capsule Take 1 capsule by mouth daily. 90 capsule 3    aspirin 81 MG chewable tablet Take 81 mg by mouth daily.  Spacer/Aero Chamber Mouthpiece MISC 1 each by Does not apply route once as needed (user with Inhaler) 1 each 0    isosorbide dinitrate (ISORDIL) 10 MG tablet TAKE 1 TABLET BY MOUTH THREE TIMES DAILY 270 tablet 1    ONETOUCH DELICA LANCETS 65D MISC Check blood sugar once daily. Dx: 250.00 100 each 1    glucose blood VI test strips (ONETOUCH VERIO) strip Check blood sugar once daily Dx: 250.00 100 each 3     No current facility-administered medications for this visit.         Lab Results:    CBC:   Lab Results   Component Value Date    WBC 7.0 01/09/2017    HGB 12.4 (L) 03/31/2017    HCT 35.8 (L) 03/31/2017    MCV 86.5 01/09/2017     01/09/2017     BMP:    Lab Results   Component Value Date     10/06/2017     03/31/2017     01/09/2017    K 4.7 10/06/2017    K 4.6 03/31/2017    K 4.2 01/09/2017     10/06/2017     03/31/2017     01/09/2017    CO2 23 10/06/2017    CO2 19 (L) 03/31/2017    CO2 23 01/09/2017    BUN 48 (H) 10/06/2017    BUN 39 (H) 03/31/2017    BUN 30 (H) 01/09/2017    CREATININE 2.9 (H) 10/06/2017    CREATININE 2.8 (H) 03/31/2017    CREATININE 2.6 (H) 01/09/2017    GLUCOSE 151 (H) 10/06/2017    GLUCOSE 230 08/08/2017    GLUCOSE 181 05/02/2017      Hepatic:   Lab Results   Component Value Date    AST 17 10/18/2016    AST 22 05/12/2014    AST 21 06/06/2012    ALT 11 10/18/2016    ALT 18 05/12/2014    ALT 19 06/06/2012    BILITOT 0.4 10/18/2016    BILITOT 0.8 05/12/2014    BILITOT 0.8 06/06/2012    ALKPHOS 135 (H) 10/18/2016    ALKPHOS 162 (H) 05/12/2014    ALKPHOS 132 (H) 06/06/2012     BNP: No results found for: BNP  Lipids:   Lab Results   Component Value Date    CHOL 101 10/18/2016    HDL 37 10/18/2016     INR:   Lab Results   Component Value Date    INR 1.14 (H) 01/09/2017     URINE:   Lab Results   Component Value Date    PROTUR 17.4 09/28/2016     Lab Results   Component Value Date    LEUKOCYTESUR neg 07/22/2014    BLOODU small 07/22/2014      Microalbumen/Creatinine ratio:  No components found for: RUCREAT    Objective:   Vitals: /84   Pulse 68   Resp 16   Wt 154 lb (69.9 kg)   BMI 22.10 kg/m²      Constitutional:  Alert, awake, no apparent distress  Skin:normal  HEENT:Pupils are reactive . Throat is clear  Neck:supple with no thyromegally  Cardiovascular:  S1, S2 without m/r/g  Respiratory:  CTA B without w/r/r  Abdomen: +bs, soft, nt  Ext: No LE edema  Musculoskeletal:Intact  Neuro:Alert and oriented with no deficit    Electronically signed by Dania Javier MD on

## 2017-10-12 ENCOUNTER — OFFICE VISIT (OUTPATIENT)
Dept: FAMILY MEDICINE CLINIC | Age: 82
End: 2017-10-12

## 2017-10-12 VITALS
RESPIRATION RATE: 12 BRPM | HEART RATE: 56 BPM | HEIGHT: 70 IN | BODY MASS INDEX: 22.19 KG/M2 | SYSTOLIC BLOOD PRESSURE: 110 MMHG | WEIGHT: 155 LBS | DIASTOLIC BLOOD PRESSURE: 62 MMHG

## 2017-10-12 DIAGNOSIS — I10 ESSENTIAL HYPERTENSION: ICD-10-CM

## 2017-10-12 DIAGNOSIS — J45.909 ASTHMATIC BRONCHITIS, UNSPECIFIED ASTHMA SEVERITY, UNCOMPLICATED: Primary | ICD-10-CM

## 2017-10-12 PROCEDURE — 99213 OFFICE O/P EST LOW 20 MIN: CPT | Performed by: EMERGENCY MEDICINE

## 2017-10-12 RX ORDER — METOPROLOL SUCCINATE 100 MG/1
50 TABLET, EXTENDED RELEASE ORAL DAILY PRN
Qty: 45 TABLET | Refills: 2 | Status: ON HOLD | OUTPATIENT
Start: 2017-10-12 | End: 2018-04-17 | Stop reason: HOSPADM

## 2017-10-12 RX ORDER — LEVOFLOXACIN 500 MG/1
500 TABLET, FILM COATED ORAL DAILY
Qty: 5 TABLET | Refills: 0 | Status: SHIPPED | OUTPATIENT
Start: 2017-10-12 | End: 2017-10-12 | Stop reason: DRUGHIGH

## 2017-10-12 RX ORDER — TERAZOSIN 5 MG/1
CAPSULE ORAL
Qty: 180 CAPSULE | Refills: 1 | Status: SHIPPED | OUTPATIENT
Start: 2017-10-12 | End: 2018-05-17 | Stop reason: SINTOL

## 2017-10-12 RX ORDER — LEVOFLOXACIN 250 MG/1
250 TABLET ORAL DAILY
Qty: 5 TABLET | Refills: 0 | Status: SHIPPED | OUTPATIENT
Start: 2017-10-12 | End: 2017-10-22

## 2017-10-12 RX ORDER — ATORVASTATIN CALCIUM 10 MG/1
TABLET, FILM COATED ORAL
Qty: 90 TABLET | Refills: 1 | Status: SHIPPED | OUTPATIENT
Start: 2017-10-12 | End: 2017-11-09 | Stop reason: SDUPTHER

## 2017-10-12 RX ORDER — GLIMEPIRIDE 1 MG/1
1 TABLET ORAL
Qty: 90 TABLET | Refills: 1 | Status: SHIPPED | OUTPATIENT
Start: 2017-10-12 | End: 2017-11-09 | Stop reason: SDUPTHER

## 2017-10-12 RX ORDER — CLONIDINE HYDROCHLORIDE 0.3 MG/1
0.3 TABLET ORAL 3 TIMES DAILY
Qty: 270 TABLET | Refills: 1 | Status: SHIPPED | OUTPATIENT
Start: 2017-10-12 | End: 2017-11-09 | Stop reason: SDUPTHER

## 2017-10-12 RX ORDER — SODIUM BICARBONATE 650 MG/1
650 TABLET ORAL 2 TIMES DAILY
Qty: 60 TABLET | Refills: 5 | Status: CANCELLED | OUTPATIENT
Start: 2017-10-12

## 2017-10-12 ASSESSMENT — ENCOUNTER SYMPTOMS
TROUBLE SWALLOWING: 0
VOMITING: 0
CONSTIPATION: 0
ABDOMINAL PAIN: 0
VOICE CHANGE: 0
SHORTNESS OF BREATH: 0
RHINORRHEA: 0
SORE THROAT: 0
BACK PAIN: 0
CHEST TIGHTNESS: 0
COUGH: 1
DIARRHEA: 0
WHEEZING: 0
SINUS PRESSURE: 0
NAUSEA: 0

## 2017-10-12 NOTE — PROGRESS NOTES
daily       vitamin E 400 UNIT capsule Take 1 capsule by mouth daily. 90 capsule 3    aspirin 81 MG chewable tablet Take 81 mg by mouth daily.  Spacer/Aero Chamber Mouthpiece MISC 1 each by Does not apply route once as needed (user with Inhaler) 1 each 0     No current facility-administered medications for this visit. Subjective:      Review of Systems   Constitutional: Negative for appetite change, chills, diaphoresis, fatigue and fever. HENT: Positive for congestion. Negative for ear discharge, ear pain, postnasal drip, rhinorrhea, sinus pressure, sore throat, trouble swallowing and voice change. Respiratory: Positive for cough. Negative for chest tightness, shortness of breath and wheezing. Cardiovascular: Negative for chest pain, palpitations and leg swelling. Gastrointestinal: Negative for abdominal pain, constipation, diarrhea, nausea and vomiting. Musculoskeletal: Negative for arthralgias, back pain, joint swelling, myalgias, neck pain and neck stiffness. Skin: Negative for rash. Neurological: Negative for dizziness, syncope, weakness, light-headedness, numbness and headaches. Objective:     /62 (Site: Left Arm, Position: Sitting, Cuff Size: Medium Adult)   Pulse 56   Resp 12   Ht 5' 10\" (1.778 m)   Wt 155 lb (70.3 kg)   BMI 22.24 kg/m²   BP Readings from Last 3 Encounters:   10/12/17 110/62   10/09/17 128/84   10/05/17 (!) 132/52     Wt Readings from Last 3 Encounters:   10/12/17 155 lb (70.3 kg)   10/09/17 154 lb (69.9 kg)   10/05/17 153 lb 3.2 oz (69.5 kg)       Physical Exam   Constitutional: He is oriented to person, place, and time. He appears well-developed and well-nourished. He is cooperative. HENT:   Head: Normocephalic and atraumatic.    Right Ear: External ear normal.   Left Ear: External ear normal.   Nose: Nose normal.   Mouth/Throat: Oropharynx is clear and moist.   Eyes: Conjunctivae and EOM are normal. Pupils are equal, round, and reactive to encounter. Results of Laboratory tests taken 10/6/17 were reviewed with the patient. Results were w/in  acceptable range except for the creatinine of 2.9 .5 and a glucose of 251    Return in about 4 weeks (around 11/9/2017). Discussed use, benefit, and side effects of prescribed medications. All patient questions answered. Pt voiced understanding. Instructed to continue current medications, diet and exercise. Patient agreed with treatment plan.

## 2017-11-06 ENCOUNTER — HOSPITAL ENCOUNTER (OUTPATIENT)
Age: 82
Discharge: HOME | End: 2017-11-06
Payer: MEDICARE

## 2017-11-06 DIAGNOSIS — I10 ESSENTIAL HYPERTENSION: ICD-10-CM

## 2017-11-06 DIAGNOSIS — E78.5 HYPERLIPIDEMIA, UNSPECIFIED HYPERLIPIDEMIA TYPE: ICD-10-CM

## 2017-11-06 LAB
ALBUMIN SERPL-MCNC: 4.3 G/DL (ref 3.5–5.1)
ALP BLD-CCNC: 145 U/L (ref 38–126)
ALT SERPL-CCNC: 13 U/L (ref 11–66)
ANION GAP SERPL CALCULATED.3IONS-SCNC: 10 MEQ/L (ref 8–16)
AST SERPL-CCNC: 20 U/L (ref 5–40)
BILIRUB SERPL-MCNC: 0.5 MG/DL (ref 0.3–1.2)
BUN BLDV-MCNC: 36 MG/DL (ref 7–22)
CALCIUM SERPL-MCNC: 9.3 MG/DL (ref 8.5–10.5)
CHLORIDE BLD-SCNC: 111 MEQ/L (ref 98–111)
CHOLESTEROL, TOTAL: 140 MG/DL (ref 100–199)
CO2: 24 MEQ/L (ref 23–33)
CREAT SERPL-MCNC: 2.7 MG/DL (ref 0.4–1.2)
GFR SERPL CREATININE-BSD FRML MDRD: 23 ML/MIN/1.73M2
GLUCOSE BLD-MCNC: 103 MG/DL (ref 70–108)
HDLC SERPL-MCNC: 44 MG/DL
LDL CHOLESTEROL CALCULATED: 68 MG/DL
POTASSIUM SERPL-SCNC: 5 MEQ/L (ref 3.5–5.2)
SODIUM BLD-SCNC: 145 MEQ/L (ref 135–145)
TOTAL PROTEIN: 6.9 G/DL (ref 6.1–8)
TRIGL SERPL-MCNC: 138 MG/DL (ref 0–199)

## 2017-11-06 PROCEDURE — 36415 COLL VENOUS BLD VENIPUNCTURE: CPT

## 2017-11-06 PROCEDURE — 80053 COMPREHEN METABOLIC PANEL: CPT

## 2017-11-06 PROCEDURE — 80061 LIPID PANEL: CPT

## 2017-11-09 ENCOUNTER — OFFICE VISIT (OUTPATIENT)
Dept: FAMILY MEDICINE CLINIC | Age: 82
End: 2017-11-09

## 2017-11-09 VITALS
RESPIRATION RATE: 16 BRPM | HEIGHT: 70 IN | BODY MASS INDEX: 22.45 KG/M2 | WEIGHT: 156.8 LBS | SYSTOLIC BLOOD PRESSURE: 138 MMHG | HEART RATE: 80 BPM | DIASTOLIC BLOOD PRESSURE: 86 MMHG

## 2017-11-09 DIAGNOSIS — N18.4 DIABETES MELLITUS DUE TO UNDERLYING CONDITION WITH STAGE 4 CHRONIC KIDNEY DISEASE, UNSPECIFIED LONG TERM INSULIN USE STATUS: Primary | ICD-10-CM

## 2017-11-09 DIAGNOSIS — E08.22 DIABETES MELLITUS DUE TO UNDERLYING CONDITION WITH STAGE 4 CHRONIC KIDNEY DISEASE, UNSPECIFIED LONG TERM INSULIN USE STATUS: Primary | ICD-10-CM

## 2017-11-09 DIAGNOSIS — Z23 NEED FOR IMMUNIZATION AGAINST INFLUENZA: ICD-10-CM

## 2017-11-09 DIAGNOSIS — I10 ESSENTIAL HYPERTENSION: ICD-10-CM

## 2017-11-09 DIAGNOSIS — E78.5 HYPERLIPIDEMIA, UNSPECIFIED HYPERLIPIDEMIA TYPE: ICD-10-CM

## 2017-11-09 DIAGNOSIS — N18.4 CKD (CHRONIC KIDNEY DISEASE) STAGE 4, GFR 15-29 ML/MIN (HCC): ICD-10-CM

## 2017-11-09 LAB
CREATININE URINE POCT: ABNORMAL
GLUCOSE BLD-MCNC: 92 MG/DL
HBA1C MFR BLD: 6 %
MICROALBUMIN/CREAT 24H UR: 100 MG/G{CREAT}
MICROALBUMIN/CREAT UR-RTO: ABNORMAL

## 2017-11-09 PROCEDURE — 82044 UR ALBUMIN SEMIQUANTITATIVE: CPT | Performed by: EMERGENCY MEDICINE

## 2017-11-09 PROCEDURE — 82962 GLUCOSE BLOOD TEST: CPT | Performed by: EMERGENCY MEDICINE

## 2017-11-09 PROCEDURE — 83036 HEMOGLOBIN GLYCOSYLATED A1C: CPT | Performed by: EMERGENCY MEDICINE

## 2017-11-09 PROCEDURE — 99213 OFFICE O/P EST LOW 20 MIN: CPT | Performed by: EMERGENCY MEDICINE

## 2017-11-09 PROCEDURE — G0008 ADMIN INFLUENZA VIRUS VAC: HCPCS | Performed by: EMERGENCY MEDICINE

## 2017-11-09 RX ORDER — ATORVASTATIN CALCIUM 10 MG/1
TABLET, FILM COATED ORAL
Qty: 90 TABLET | Refills: 1 | Status: SHIPPED | OUTPATIENT
Start: 2017-11-09 | End: 2019-01-01 | Stop reason: SDUPTHER

## 2017-11-09 RX ORDER — GLIMEPIRIDE 1 MG/1
1 TABLET ORAL
Qty: 90 TABLET | Refills: 1 | Status: SHIPPED | OUTPATIENT
Start: 2017-11-09 | End: 2018-02-13 | Stop reason: SDUPTHER

## 2017-11-09 RX ORDER — CLONIDINE HYDROCHLORIDE 0.3 MG/1
0.3 TABLET ORAL 3 TIMES DAILY
Qty: 270 TABLET | Refills: 1 | Status: SHIPPED | OUTPATIENT
Start: 2017-11-09 | End: 2018-02-13 | Stop reason: SDUPTHER

## 2017-11-09 ASSESSMENT — ENCOUNTER SYMPTOMS
TROUBLE SWALLOWING: 0
SHORTNESS OF BREATH: 0
WHEEZING: 0
CHEST TIGHTNESS: 0
VOICE CHANGE: 0
SINUS PRESSURE: 0
ABDOMINAL PAIN: 0
DIARRHEA: 0
CONSTIPATION: 0
NAUSEA: 0
RHINORRHEA: 0
BACK PAIN: 0
COUGH: 0
SORE THROAT: 0
VOMITING: 0

## 2017-11-09 ASSESSMENT — PATIENT HEALTH QUESTIONNAIRE - PHQ9
2. FEELING DOWN, DEPRESSED OR HOPELESS: 0
SUM OF ALL RESPONSES TO PHQ9 QUESTIONS 1 & 2: 0
SUM OF ALL RESPONSES TO PHQ QUESTIONS 1-9: 0
1. LITTLE INTEREST OR PLEASURE IN DOING THINGS: 0

## 2017-11-09 NOTE — PROGRESS NOTES
3 Encounters:   11/09/17 156 lb 12.8 oz (71.1 kg)   10/12/17 155 lb (70.3 kg)   10/09/17 154 lb (69.9 kg)       Physical Exam   Constitutional: He is oriented to person, place, and time. He appears well-developed and well-nourished. He is cooperative. HENT:   Head: Normocephalic and atraumatic. Right Ear: External ear normal.   Left Ear: External ear normal.   Nose: Nose normal.   Mouth/Throat: Oropharynx is clear and moist.   Eyes: Conjunctivae and EOM are normal. Pupils are equal, round, and reactive to light. No scleral icterus. Neck: Normal range of motion. Neck supple. No JVD present. No thyromegaly present. Cardiovascular: Normal rate, regular rhythm and intact distal pulses. Exam reveals no friction rub. No murmur heard. Pulmonary/Chest: Effort normal and breath sounds normal. He has no wheezes. He has no rales. He exhibits no tenderness. Abdominal: Soft. Bowel sounds are normal. He exhibits no mass. There is no tenderness. Musculoskeletal: He exhibits no edema. Lymphadenopathy:     He has no cervical adenopathy. Neurological: He is alert and oriented to person, place, and time. Skin: No rash noted. Vitals reviewed. Assessment:      1. 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 Essential hypertension     3. CKD (chronic kidney disease) stage 4, GFR 15-29 ml/min ([facility])     4. Hyperlipidemia, unspecified hyperlipidemia type     5.  Need for immunization against influenza         Plan:      Medications Prescribed:  Orders Placed This Encounter   Medications    cloNIDine (CATAPRES) 0.3 MG tablet     Sig: Take 1 tablet by mouth 3 times daily     Dispense:  270 tablet     Refill:  1    glimepiride (AMARYL) 1 MG tablet     Sig: Take 1 tablet by mouth every morning (before breakfast)     Dispense:  90 tablet     Refill:  1  atorvastatin (LIPITOR) 10 MG tablet     Sig: TAKE 1 TABLET BY MOUTH EVERY DAY     Dispense:  90 tablet     Refill:  1       Orders Placed:  Orders Placed This Encounter   Procedures    INFLUENZA, MDCK QUADV, 4 YRS AND OLDER, IM, PF, PREFILL SYR OR SDV, 0.5ML (FLUCELVAX QUADV, PF)    POCT glycosylated hemoglobin (Hb A1C)    POCT Glucose    POCT microalbumin     Lab Results   Component Value Date    LABA1C 6.0 11/09/2017    LABA1C 5.8 08/08/2017    LABA1C 5.7 05/02/2017     Lab Results   Component Value Date    LABMICR 41.43 01/28/2016    LDLCALC 68 11/06/2017    CREATININE 2.7 (H) 11/06/2017       Results of Laboratory tests taken 11/6/18  were reviewed with the patient. Results were w/in  acceptable range    Continue to monitor blood sugars 1 times a day. Return in about 3 months (around 2/9/2018) for DM. Discussed use, benefit, and side effects of prescribed medications. All patient questions answered. Pt voiced understanding. Instructed to continue current medications, diet and exercise. Patient agreed with treatment plan.

## 2017-11-09 NOTE — PROGRESS NOTES
After obtaining consent, and per orders of Dr. Joan Middleton, injection of Flucelvax given in Left deltoid by Liza Zepeda. Patient instructed to remain in clinic for 20 minutes afterwards, and to report any adverse reaction to me immediately.

## 2017-11-10 ENCOUNTER — TELEPHONE (OUTPATIENT)
Dept: NEPHROLOGY | Age: 82
End: 2017-11-10

## 2017-11-10 RX ORDER — SODIUM BICARBONATE 650 MG/1
650 TABLET ORAL 2 TIMES DAILY
COMMUNITY
End: 2017-11-13 | Stop reason: DRUGHIGH

## 2017-11-10 RX ORDER — SODIUM BICARBONATE 650 MG/1
650 TABLET ORAL 2 TIMES DAILY
Qty: 60 TABLET | Refills: 5 | Status: CANCELLED | OUTPATIENT
Start: 2017-11-10

## 2017-11-13 RX ORDER — SODIUM BICARBONATE 650 MG/1
650 TABLET ORAL 2 TIMES DAILY
Qty: 60 TABLET | Refills: 3 | Status: SHIPPED | OUTPATIENT
Start: 2017-11-13 | End: 2017-11-13 | Stop reason: SDUPTHER

## 2017-11-13 RX ORDER — SODIUM BICARBONATE 650 MG/1
650 TABLET ORAL 2 TIMES DAILY
Qty: 60 TABLET | Refills: 3 | Status: ON HOLD | OUTPATIENT
Start: 2017-11-13 | End: 2018-04-17 | Stop reason: HOSPADM

## 2017-11-13 NOTE — TELEPHONE ENCOUNTER
The patient's daughter called and said that she called on Friday for this refill and it still hasn't been forwarded to the pharmacy. Please advise.

## 2017-11-13 NOTE — TELEPHONE ENCOUNTER
The patient's daughter called and said that she called on Friday for this refill and it still hasn't been forwarded to the pharmacy. Please advise.
The patients daughter called back and stated that the error has been corrected by Dr Chiquita Singleton staff      Please approve or refuse Rx request:  Requested Prescriptions     Pending Prescriptions Disp Refills    sodium bicarbonate 650 MG tablet 60 tablet 5     Sig: Take 1 tablet by mouth 2 times daily       Next appointment:  11/10/2017
E-Prescribing Status: Receipt confirmed by pharmacy (4/6/2017  1:51 PM EDT)

## 2017-12-05 RX ORDER — LEVOFLOXACIN 250 MG/1
250 TABLET ORAL DAILY
Qty: 10 TABLET | Refills: 0 | Status: SHIPPED | OUTPATIENT
Start: 2017-12-05 | End: 2017-12-15

## 2018-01-01 ENCOUNTER — TELEPHONE (OUTPATIENT)
Dept: NEPHROLOGY | Age: 83
End: 2018-01-01

## 2018-01-01 ENCOUNTER — CARE COORDINATION (OUTPATIENT)
Dept: CARE COORDINATION | Age: 83
End: 2018-01-01

## 2018-01-01 ENCOUNTER — OFFICE VISIT (OUTPATIENT)
Dept: NEPHROLOGY | Age: 83
End: 2018-01-01
Payer: MEDICARE

## 2018-01-01 ENCOUNTER — CARE COORDINATION (OUTPATIENT)
Dept: CASE MANAGEMENT | Age: 83
End: 2018-01-01

## 2018-01-01 ENCOUNTER — TELEPHONE (OUTPATIENT)
Dept: FAMILY MEDICINE CLINIC | Age: 83
End: 2018-01-01

## 2018-01-01 ENCOUNTER — HOSPITAL ENCOUNTER (OUTPATIENT)
Age: 83
Discharge: HOME | End: 2018-11-16
Payer: MEDICARE

## 2018-01-01 ENCOUNTER — HOSPITAL ENCOUNTER (OUTPATIENT)
Dept: ULTRASOUND IMAGING | Age: 83
Discharge: HOME | End: 2018-10-26
Payer: MEDICARE

## 2018-01-01 ENCOUNTER — OFFICE VISIT (OUTPATIENT)
Dept: FAMILY MEDICINE CLINIC | Age: 83
End: 2018-01-01

## 2018-01-01 ENCOUNTER — HOSPITAL ENCOUNTER (OUTPATIENT)
Age: 83
Discharge: HOME | End: 2018-10-12
Payer: MEDICARE

## 2018-01-01 VITALS
HEART RATE: 60 BPM | BODY MASS INDEX: 20.94 KG/M2 | DIASTOLIC BLOOD PRESSURE: 60 MMHG | SYSTOLIC BLOOD PRESSURE: 132 MMHG | RESPIRATION RATE: 13 BRPM | WEIGHT: 146.25 LBS | HEIGHT: 70 IN

## 2018-01-01 VITALS
BODY MASS INDEX: 23.27 KG/M2 | OXYGEN SATURATION: 100 % | SYSTOLIC BLOOD PRESSURE: 160 MMHG | DIASTOLIC BLOOD PRESSURE: 62 MMHG | WEIGHT: 144.2 LBS | HEART RATE: 48 BPM

## 2018-01-01 VITALS
HEART RATE: 57 BPM | BODY MASS INDEX: 23.46 KG/M2 | HEIGHT: 66 IN | SYSTOLIC BLOOD PRESSURE: 122 MMHG | DIASTOLIC BLOOD PRESSURE: 52 MMHG | OXYGEN SATURATION: 100 % | WEIGHT: 146 LBS

## 2018-01-01 DIAGNOSIS — N17.9 AKI (ACUTE KIDNEY INJURY) (HCC): ICD-10-CM

## 2018-01-01 DIAGNOSIS — E78.5 HYPERLIPIDEMIA, UNSPECIFIED HYPERLIPIDEMIA TYPE: ICD-10-CM

## 2018-01-01 DIAGNOSIS — N25.81 HYPERPARATHYROIDISM, SECONDARY RENAL (HCC): ICD-10-CM

## 2018-01-01 DIAGNOSIS — E08.22 DIABETES MELLITUS DUE TO UNDERLYING CONDITION WITH STAGE 4 CHRONIC KIDNEY DISEASE, UNSPECIFIED WHETHER LONG TERM INSULIN USE (HCC): ICD-10-CM

## 2018-01-01 DIAGNOSIS — I10 ESSENTIAL HYPERTENSION: ICD-10-CM

## 2018-01-01 DIAGNOSIS — N18.4 CKD (CHRONIC KIDNEY DISEASE), STAGE IV (HCC): Primary | ICD-10-CM

## 2018-01-01 DIAGNOSIS — D63.8 ANEMIA OF CHRONIC DISEASE: ICD-10-CM

## 2018-01-01 DIAGNOSIS — J96.01 ACUTE RESPIRATORY FAILURE WITH HYPOXIA (HCC): ICD-10-CM

## 2018-01-01 DIAGNOSIS — N18.4 DIABETES MELLITUS DUE TO UNDERLYING CONDITION WITH STAGE 4 CHRONIC KIDNEY DISEASE, UNSPECIFIED WHETHER LONG TERM INSULIN USE (HCC): ICD-10-CM

## 2018-01-01 DIAGNOSIS — N17.9 AKI (ACUTE KIDNEY INJURY) (HCC): Primary | ICD-10-CM

## 2018-01-01 DIAGNOSIS — E87.20 METABOLIC ACIDOSIS: ICD-10-CM

## 2018-01-01 DIAGNOSIS — E08.21 DIABETES MELLITUS DUE TO UNDERLYING CONDITION WITH DIABETIC NEPHROPATHY, WITHOUT LONG-TERM CURRENT USE OF INSULIN (HCC): Primary | ICD-10-CM

## 2018-01-01 DIAGNOSIS — N18.4 CKD (CHRONIC KIDNEY DISEASE), STAGE IV (HCC): ICD-10-CM

## 2018-01-01 DIAGNOSIS — E55.9 VITAMIN D DEFICIENCY: ICD-10-CM

## 2018-01-01 DIAGNOSIS — I48.91 ATRIAL FIBRILLATION, UNSPECIFIED TYPE (HCC): ICD-10-CM

## 2018-01-01 LAB
ANION GAP SERPL CALCULATED.3IONS-SCNC: 15 MEQ/L (ref 8–16)
ANION GAP SERPL CALCULATED.3IONS-SCNC: 15 MEQ/L (ref 8–16)
BUN BLDV-MCNC: 38 MG/DL (ref 7–22)
BUN BLDV-MCNC: 39 MG/DL (ref 7–22)
CALCIUM SERPL-MCNC: 9.3 MG/DL (ref 8.5–10.5)
CALCIUM SERPL-MCNC: 9.3 MG/DL (ref 8.5–10.5)
CHLORIDE BLD-SCNC: 107 MEQ/L (ref 98–111)
CHLORIDE BLD-SCNC: 108 MEQ/L (ref 98–111)
CO2: 19 MEQ/L (ref 23–33)
CO2: 20 MEQ/L (ref 23–33)
CREAT SERPL-MCNC: 3.3 MG/DL (ref 0.4–1.2)
CREAT SERPL-MCNC: 3.7 MG/DL (ref 0.4–1.2)
GFR SERPL CREATININE-BSD FRML MDRD: 16 ML/MIN/1.73M2
GFR SERPL CREATININE-BSD FRML MDRD: 18 ML/MIN/1.73M2
GLUCOSE BLD-MCNC: 149 MG/DL
GLUCOSE BLD-MCNC: 74 MG/DL (ref 70–108)
GLUCOSE BLD-MCNC: 74 MG/DL (ref 70–108)
HBA1C MFR BLD: 5.2 %
PHOSPHORUS: 3.8 MG/DL (ref 2.4–4.7)
POTASSIUM SERPL-SCNC: 4.7 MEQ/L (ref 3.5–5.2)
POTASSIUM SERPL-SCNC: 5.1 MEQ/L (ref 3.5–5.2)
PTH INTACT: 156.6 PG/ML (ref 15–65)
SODIUM BLD-SCNC: 142 MEQ/L (ref 135–145)
SODIUM BLD-SCNC: 142 MEQ/L (ref 135–145)
VITAMIN D 25-HYDROXY: 35 NG/ML (ref 30–100)

## 2018-01-01 PROCEDURE — 82306 VITAMIN D 25 HYDROXY: CPT

## 2018-01-01 PROCEDURE — 99213 OFFICE O/P EST LOW 20 MIN: CPT | Performed by: EMERGENCY MEDICINE

## 2018-01-01 PROCEDURE — 4040F PNEUMOC VAC/ADMIN/RCVD: CPT | Performed by: INTERNAL MEDICINE

## 2018-01-01 PROCEDURE — G8420 CALC BMI NORM PARAMETERS: HCPCS | Performed by: INTERNAL MEDICINE

## 2018-01-01 PROCEDURE — 1101F PT FALLS ASSESS-DOCD LE1/YR: CPT | Performed by: INTERNAL MEDICINE

## 2018-01-01 PROCEDURE — 99213 OFFICE O/P EST LOW 20 MIN: CPT | Performed by: INTERNAL MEDICINE

## 2018-01-01 PROCEDURE — G8427 DOCREV CUR MEDS BY ELIG CLIN: HCPCS | Performed by: INTERNAL MEDICINE

## 2018-01-01 PROCEDURE — G8598 ASA/ANTIPLAT THER USED: HCPCS | Performed by: INTERNAL MEDICINE

## 2018-01-01 PROCEDURE — 80048 BASIC METABOLIC PNL TOTAL CA: CPT

## 2018-01-01 PROCEDURE — G8482 FLU IMMUNIZE ORDER/ADMIN: HCPCS | Performed by: INTERNAL MEDICINE

## 2018-01-01 PROCEDURE — 90688 IIV4 VACCINE SPLT 0.5 ML IM: CPT | Performed by: EMERGENCY MEDICINE

## 2018-01-01 PROCEDURE — 36415 COLL VENOUS BLD VENIPUNCTURE: CPT

## 2018-01-01 PROCEDURE — 1036F TOBACCO NON-USER: CPT | Performed by: INTERNAL MEDICINE

## 2018-01-01 PROCEDURE — 1101F PT FALLS ASSESS-DOCD LE1/YR: CPT | Performed by: EMERGENCY MEDICINE

## 2018-01-01 PROCEDURE — 1123F ACP DISCUSS/DSCN MKR DOCD: CPT | Performed by: INTERNAL MEDICINE

## 2018-01-01 PROCEDURE — G0008 ADMIN INFLUENZA VIRUS VAC: HCPCS | Performed by: EMERGENCY MEDICINE

## 2018-01-01 PROCEDURE — 76770 US EXAM ABDO BACK WALL COMP: CPT

## 2018-01-01 PROCEDURE — 82962 GLUCOSE BLOOD TEST: CPT | Performed by: EMERGENCY MEDICINE

## 2018-01-01 PROCEDURE — 83970 ASSAY OF PARATHORMONE: CPT

## 2018-01-01 PROCEDURE — 84100 ASSAY OF PHOSPHORUS: CPT

## 2018-01-01 PROCEDURE — 83036 HEMOGLOBIN GLYCOSYLATED A1C: CPT | Performed by: EMERGENCY MEDICINE

## 2018-01-01 RX ORDER — FAMOTIDINE 20 MG/1
20 TABLET, FILM COATED ORAL 2 TIMES DAILY
COMMUNITY
End: 2018-01-01 | Stop reason: ALTCHOICE

## 2018-01-01 RX ORDER — CLONIDINE HYDROCHLORIDE 0.1 MG/1
0.2 TABLET ORAL 3 TIMES DAILY
Qty: 90 TABLET | Refills: 5 | Status: SHIPPED | OUTPATIENT
Start: 2018-01-01 | End: 2018-01-01 | Stop reason: SDUPTHER

## 2018-01-01 RX ORDER — CLONIDINE HYDROCHLORIDE 0.1 MG/1
TABLET ORAL
Qty: 540 TABLET | Refills: 5 | Status: SHIPPED | OUTPATIENT
Start: 2018-01-01 | End: 2019-01-01

## 2018-01-01 RX ORDER — CEFUROXIME AXETIL 250 MG/1
250 TABLET ORAL 2 TIMES DAILY
Qty: 20 TABLET | Refills: 0 | Status: SHIPPED | OUTPATIENT
Start: 2018-01-01 | End: 2018-01-01

## 2018-01-01 RX ORDER — CLONIDINE HYDROCHLORIDE 0.2 MG/1
0.2 TABLET ORAL 3 TIMES DAILY
Qty: 270 TABLET | Refills: 1 | Status: SHIPPED | OUTPATIENT
Start: 2018-01-01 | End: 2018-01-01 | Stop reason: SDUPTHER

## 2018-01-01 ASSESSMENT — ENCOUNTER SYMPTOMS
RHINORRHEA: 0
COUGH: 0
CONSTIPATION: 0
SORE THROAT: 0
ABDOMINAL PAIN: 0
VOMITING: 0
NAUSEA: 0
SHORTNESS OF BREATH: 0
CHEST TIGHTNESS: 0
TROUBLE SWALLOWING: 0
SINUS PRESSURE: 0
WHEEZING: 0
BACK PAIN: 0
VOICE CHANGE: 0
DIARRHEA: 0

## 2018-02-13 ENCOUNTER — OFFICE VISIT (OUTPATIENT)
Dept: FAMILY MEDICINE CLINIC | Age: 83
End: 2018-02-13

## 2018-02-13 VITALS
BODY MASS INDEX: 22.59 KG/M2 | WEIGHT: 157.8 LBS | RESPIRATION RATE: 14 BRPM | HEIGHT: 70 IN | DIASTOLIC BLOOD PRESSURE: 70 MMHG | SYSTOLIC BLOOD PRESSURE: 130 MMHG | HEART RATE: 56 BPM

## 2018-02-13 DIAGNOSIS — N18.4 CHRONIC KIDNEY DISEASE (CKD), STAGE IV (SEVERE) (HCC): ICD-10-CM

## 2018-02-13 DIAGNOSIS — E11.8 TYPE 2 DIABETES MELLITUS WITH COMPLICATION, WITHOUT LONG-TERM CURRENT USE OF INSULIN (HCC): ICD-10-CM

## 2018-02-13 DIAGNOSIS — N18.4 DIABETES MELLITUS DUE TO UNDERLYING CONDITION WITH STAGE 4 CHRONIC KIDNEY DISEASE, UNSPECIFIED LONG TERM INSULIN USE STATUS: Primary | ICD-10-CM

## 2018-02-13 DIAGNOSIS — E08.22 DIABETES MELLITUS DUE TO UNDERLYING CONDITION WITH STAGE 4 CHRONIC KIDNEY DISEASE, UNSPECIFIED LONG TERM INSULIN USE STATUS: Primary | ICD-10-CM

## 2018-02-13 DIAGNOSIS — R22.9 SKIN NODULE: ICD-10-CM

## 2018-02-13 DIAGNOSIS — E78.5 HYPERLIPIDEMIA, UNSPECIFIED HYPERLIPIDEMIA TYPE: ICD-10-CM

## 2018-02-13 DIAGNOSIS — I10 ESSENTIAL HYPERTENSION: ICD-10-CM

## 2018-02-13 DIAGNOSIS — I73.9 PERIPHERAL ARTERIAL DISEASE (HCC): ICD-10-CM

## 2018-02-13 LAB
GLUCOSE BLD-MCNC: 116 MG/DL
HBA1C MFR BLD: 5.6 %

## 2018-02-13 PROCEDURE — 83036 HEMOGLOBIN GLYCOSYLATED A1C: CPT | Performed by: EMERGENCY MEDICINE

## 2018-02-13 PROCEDURE — 99214 OFFICE O/P EST MOD 30 MIN: CPT | Performed by: EMERGENCY MEDICINE

## 2018-02-13 PROCEDURE — 82962 GLUCOSE BLOOD TEST: CPT | Performed by: EMERGENCY MEDICINE

## 2018-02-13 RX ORDER — GLIMEPIRIDE 1 MG/1
1 TABLET ORAL
Qty: 90 TABLET | Refills: 1 | Status: SHIPPED | OUTPATIENT
Start: 2018-02-13 | End: 2018-07-10 | Stop reason: SDUPTHER

## 2018-02-13 RX ORDER — CLONIDINE HYDROCHLORIDE 0.3 MG/1
0.3 TABLET ORAL 3 TIMES DAILY
Qty: 270 TABLET | Refills: 1 | Status: ON HOLD | OUTPATIENT
Start: 2018-02-13 | End: 2018-04-17

## 2018-02-13 RX ORDER — NIFEDIPINE 90 MG/1
90 TABLET, EXTENDED RELEASE ORAL DAILY
Qty: 90 TABLET | Refills: 1 | Status: ON HOLD | OUTPATIENT
Start: 2018-02-13 | End: 2018-04-17 | Stop reason: HOSPADM

## 2018-02-13 ASSESSMENT — ENCOUNTER SYMPTOMS
SHORTNESS OF BREATH: 0
CHEST TIGHTNESS: 0

## 2018-02-13 NOTE — PROGRESS NOTES
Visit Date: 3/22/2018    Willy Rocha is a 80 y.o. male who presents today for:  Chief Complaint   Patient presents with    Diabetes    Hypertension         HPI:     Lump on thr right forearm for 1 month and mildy sore      Diabetes   He presents for his follow-up diabetic visit. He has type 2 diabetes mellitus. His disease course has been stable. Hypoglycemia symptoms include tremors. Pertinent negatives for hypoglycemia include no dizziness, headaches, sleepiness or sweats. Pertinent negatives for diabetes include no chest pain, no fatigue and no weakness. Symptoms are stable. Risk factors for coronary artery disease include diabetes mellitus, dyslipidemia, hypertension and male sex. Current diabetic treatment includes oral agent (monotherapy) (patient is not compliant with dieting, she eats whatever she wants). He is following a diabetic and generally healthy diet. His breakfast blood glucose range is generally 140-180 mg/dl. Hypertension   Pertinent negatives include no chest pain, headaches, neck pain, palpitations, shortness of breath or sweats. Coronary Artery Disease   Pertinent negatives include no chest pain, chest tightness, dizziness, leg swelling, palpitations or shortness of breath. Risk factors include hyperlipidemia. Hyperlipidemia   This is a chronic problem. The problem is controlled. Pertinent negatives include no chest pain, myalgias or shortness of breath.          Current Medications:  Current Outpatient Prescriptions   Medication Sig Dispense Refill    glimepiride (AMARYL) 1 MG tablet Take 1 tablet by mouth every morning (before breakfast) 90 tablet 1    cloNIDine (CATAPRES) 0.3 MG tablet Take 1 tablet by mouth 3 times daily 270 tablet 1    NIFEdipine (PROCARDIA XL) 90 MG extended release tablet Take 1 tablet by mouth daily 90 tablet 1    sodium bicarbonate 650 MG tablet Take 1 tablet by mouth 2 times daily 60 tablet 3    atorvastatin (LIPITOR) 10 MG tablet TAKE 1 TABLET BY MOUTH EVERY DAY 90 tablet 1    terazosin (HYTRIN) 5 MG capsule TAKE ONE CAPSULE BY MOUTH TWICE DAILY 180 capsule 1    metoprolol succinate (TOPROL XL) 100 MG extended release tablet Take 0.5 tablets by mouth daily as needed (daily PRN) 45 tablet 2    ONETOUCH DELICA LANCETS 08X MISC Check blood sugar once daily. Dx: 250.00 100 each 1    oxyCODONE-acetaminophen (PERCOCET)  MG per tablet 1 tablet 3 times a day as needed for pain 60 tablet 0    glucose blood VI test strips (ONETOUCH VERIO) strip Check blood sugar once daily Dx: 250.00 100 each 3    Cholecalciferol (VITAMIN D PO) Take 2,000 Units by mouth daily       vitamin E 400 UNIT capsule Take 1 capsule by mouth daily. 90 capsule 3    aspirin 81 MG chewable tablet Take 81 mg by mouth daily.  sodium bicarbonate 650 MG tablet Take 1 tablet by mouth 2 times daily 60 tablet 0    Spacer/Aero Chamber Mouthpiece MISC 1 each by Does not apply route once as needed (user with Inhaler) 1 each 0     No current facility-administered medications for this visit. Subjective:      Review of Systems   Constitutional: Negative for appetite change, chills, diaphoresis, fatigue and fever. HENT: Negative for congestion, ear discharge, ear pain, postnasal drip, rhinorrhea, sinus pressure, sore throat, trouble swallowing and voice change. Respiratory: Negative for cough, chest tightness, shortness of breath and wheezing. Cardiovascular: Negative for chest pain, palpitations and leg swelling. Gastrointestinal: Negative for abdominal pain, constipation, diarrhea, nausea and vomiting. Musculoskeletal: Negative for arthralgias, back pain, joint swelling, myalgias, neck pain and neck stiffness. Skin: Negative for rash. Neurological: Positive for tremors. Negative for dizziness, syncope, weakness, light-headedness, numbness and headaches.        Objective:     /70 (Site: Left Arm, Position: Sitting, Cuff Size: Medium Adult)   Pulse 56   Resp 14    5' 10\" (1.778 m)   Wt 157 lb 12.8 oz (71.6 kg)   BMI 22.64 kg/m²   BP Readings from Last 3 Encounters:   03/02/18 134/64   02/13/18 130/70   11/09/17 138/86     Wt Readings from Last 3 Encounters:   03/02/18 163 lb (73.9 kg)   02/13/18 157 lb 12.8 oz (71.6 kg)   11/09/17 156 lb 12.8 oz (71.1 kg)       Physical Exam   Constitutional: He is oriented to person, place, and time. He appears well-developed and well-nourished. He is cooperative. HENT:   Head: Normocephalic and atraumatic. Right Ear: External ear normal.   Left Ear: External ear normal.   Nose: Nose normal.   Mouth/Throat: Oropharynx is clear and moist.   Eyes: Conjunctivae and EOM are normal. Pupils are equal, round, and reactive to light. No scleral icterus. Neck: Normal range of motion. Neck supple. No JVD present. No thyromegaly present. Cardiovascular: Normal rate, regular rhythm and intact distal pulses. Exam reveals no friction rub. No murmur heard. Pulmonary/Chest: Effort normal and breath sounds normal. He has no wheezes. He has no rales. He exhibits no tenderness. Abdominal: Soft. Bowel sounds are normal. He exhibits no mass. There is no tenderness. Musculoskeletal: He exhibits no edema. Lymphadenopathy:     He has no cervical adenopathy. Neurological: He is alert and oriented to person, place, and time. Skin: No rash noted. Vitals reviewed. Assessment:      1. Diabetes mellitus due to underlying condition with stage 4 chronic kidney disease, unspecified long term insulin use status (HCC)  POCT Glucose    POCT glycosylated hemoglobin (Hb A1C)   2. Hyperlipidemia, unspecified hyperlipidemia type     3. Essential hypertension     4.  Skin nodule  Do Montanez MD, Tariq Govea MD       Plan:      Medications Prescribed:  Orders Placed This Encounter   Medications    glimepiride (AMARYL) 1 MG tablet     Sig: Take 1 tablet by mouth every morning (before breakfast)     Dispense:  90 tablet Refill:  1    cloNIDine (CATAPRES) 0.3 MG tablet     Sig: Take 1 tablet by mouth 3 times daily     Dispense:  270 tablet     Refill:  1    NIFEdipine (PROCARDIA XL) 90 MG extended release tablet     Sig: Take 1 tablet by mouth daily     Dispense:  90 tablet     Refill:  1       Orders Placed:  Orders Placed This Encounter   Procedures    Aravind Mendoza MD, Wanda Springer MD     Referral Priority:   Routine     Referral Type:   Consult for Advice and Opinion     Referral Reason:   Specialty Services Required     Referred to Provider:   Aravind Mendoza MD     Requested Specialty:   Dermatology     Number of Visits Requested:   1    POCT Glucose    POCT glycosylated hemoglobin (Hb A1C)     Lab Results   Component Value Date    LABA1C 5.6 02/13/2018    LABA1C 6.0 11/09/2017    LABA1C 5.8 08/08/2017     Lab Results   Component Value Date    LABMICR 41.43 01/28/2016    LDLCALC 68 11/06/2017    CREATININE 2.7 (H) 11/06/2017       Continue to monitor blood sugars 1 times a day. Return in about 3 months (around 5/13/2018), or dm. Discussed use, benefit, and side effects of prescribed medications. All patient questions answered. Pt voiced understanding. Instructed to continue current medications, diet and exercise. Patient agreed with treatment plan.

## 2018-03-02 ENCOUNTER — OFFICE VISIT (OUTPATIENT)
Dept: CARDIOLOGY CLINIC | Age: 83
End: 2018-03-02
Payer: MEDICARE

## 2018-03-02 VITALS
DIASTOLIC BLOOD PRESSURE: 64 MMHG | HEIGHT: 68 IN | WEIGHT: 163 LBS | HEART RATE: 73 BPM | BODY MASS INDEX: 24.71 KG/M2 | SYSTOLIC BLOOD PRESSURE: 134 MMHG

## 2018-03-02 DIAGNOSIS — I48.0 PAF (PAROXYSMAL ATRIAL FIBRILLATION) (HCC): ICD-10-CM

## 2018-03-02 DIAGNOSIS — E78.01 FAMILIAL HYPERCHOLESTEROLEMIA: ICD-10-CM

## 2018-03-02 DIAGNOSIS — I25.119 CORONARY ARTERY DISEASE INVOLVING NATIVE CORONARY ARTERY OF NATIVE HEART WITH ANGINA PECTORIS (HCC): Primary | ICD-10-CM

## 2018-03-02 DIAGNOSIS — I10 ESSENTIAL HYPERTENSION: ICD-10-CM

## 2018-03-02 PROCEDURE — G8484 FLU IMMUNIZE NO ADMIN: HCPCS | Performed by: NUCLEAR MEDICINE

## 2018-03-02 PROCEDURE — G8420 CALC BMI NORM PARAMETERS: HCPCS | Performed by: NUCLEAR MEDICINE

## 2018-03-02 PROCEDURE — G8427 DOCREV CUR MEDS BY ELIG CLIN: HCPCS | Performed by: NUCLEAR MEDICINE

## 2018-03-02 PROCEDURE — 4040F PNEUMOC VAC/ADMIN/RCVD: CPT | Performed by: NUCLEAR MEDICINE

## 2018-03-02 PROCEDURE — 99214 OFFICE O/P EST MOD 30 MIN: CPT | Performed by: NUCLEAR MEDICINE

## 2018-03-02 PROCEDURE — G8598 ASA/ANTIPLAT THER USED: HCPCS | Performed by: NUCLEAR MEDICINE

## 2018-03-02 PROCEDURE — 93000 ELECTROCARDIOGRAM COMPLETE: CPT | Performed by: NUCLEAR MEDICINE

## 2018-03-02 PROCEDURE — 1036F TOBACCO NON-USER: CPT | Performed by: NUCLEAR MEDICINE

## 2018-03-02 PROCEDURE — 1123F ACP DISCUSS/DSCN MKR DOCD: CPT | Performed by: NUCLEAR MEDICINE

## 2018-03-02 NOTE — PROGRESS NOTES
Quit date: 8/19/1979    Smokeless tobacco: Never Used    Alcohol use No      Current Outpatient Prescriptions   Medication Sig Dispense Refill    glimepiride (AMARYL) 1 MG tablet Take 1 tablet by mouth every morning (before breakfast) 90 tablet 1    cloNIDine (CATAPRES) 0.3 MG tablet Take 1 tablet by mouth 3 times daily 270 tablet 1    NIFEdipine (PROCARDIA XL) 90 MG extended release tablet Take 1 tablet by mouth daily 90 tablet 1    sodium bicarbonate 650 MG tablet Take 1 tablet by mouth 2 times daily 60 tablet 3    atorvastatin (LIPITOR) 10 MG tablet TAKE 1 TABLET BY MOUTH EVERY DAY 90 tablet 1    terazosin (HYTRIN) 5 MG capsule TAKE ONE CAPSULE BY MOUTH TWICE DAILY 180 capsule 1    metoprolol succinate (TOPROL XL) 100 MG extended release tablet Take 0.5 tablets by mouth daily as needed (daily PRN) 45 tablet 2    ONETOUCH DELICA LANCETS 03O MISC Check blood sugar once daily. Dx: 250.00 100 each 1    oxyCODONE-acetaminophen (PERCOCET)  MG per tablet 1 tablet 3 times a day as needed for pain 60 tablet 0    glucose blood VI test strips (ONETOUCH VERIO) strip Check blood sugar once daily Dx: 250.00 100 each 3    Cholecalciferol (VITAMIN D PO) Take 2,000 Units by mouth daily       vitamin E 400 UNIT capsule Take 1 capsule by mouth daily. 90 capsule 3    aspirin 81 MG chewable tablet Take 81 mg by mouth daily.  Spacer/Aero Chamber Mouthpiece MISC 1 each by Does not apply route once as needed (user with Inhaler) 1 each 0     No current facility-administered medications for this visit.       Allergies   Allergen Reactions    Dye [Iodides]      Renal insufficiency     Health Maintenance   Topic Date Due    DTaP/Tdap/Td vaccine (1 - Tdap) 09/15/1952    Shingles Vaccine (1 of 2 - 2 Dose Series) 09/15/1983    Potassium monitoring  11/06/2018    Creatinine monitoring  11/06/2018    Flu vaccine  Completed    Pneumococcal high/highest risk  Completed       Subjective:  Review of Systems  General:   No fever, no chills, some fatigue or weight loss  Pulmonary:    some dyspnea, no wheezing  Cardiac:    Denies recent chest pain,   GI:     No nausea or vomiting, no abdominal pain  Neuro:     No dizziness or light headedness,   Musculoskeletal:  No recent active issues  Extremities:   No edema, good peripheral pulses      Objective:  Physical Exam  /64   Pulse 73   Ht 5' 8\" (1.727 m)   Wt 163 lb (73.9 kg)   BMI 24.78 kg/m²   General:   Well developed, well nourished  Lungs:    Clear to auscultation  Heart:    Normal S1 S2, Slight murmur. no rubs, no gallops  Abdomen:   Soft, non tender, no organomegalies, positive bowel sounds  Extremities:   No edema, no cyanosis, good peripheral pulses  Neurological:   Awake, alert, oriented. No obvious focal deficits  Musculoskelatal:  No obvious deformities    Assessment:     1. Coronary artery disease involving native coronary artery of native heart with angina pectoris ([facility])  EKG 12 Lead   2. PAF (paroxysmal atrial fibrillation) ([facility])  EKG 12 Lead   3. Essential hypertension     cardiac as above  Multiple risk factors for CAD  ECG in office was done today. I reviewed the ECG. No acute findings    Plan:  No Follow-up on file. We discussed watchman  He is not interested in coumadin, or any other options  Continue risk factor modification and medical management  Thank you for allowing me to participate in the care of your patient. Please don't hesitate to contact me regarding any further issues related to the patient care    Orders Placed:  Orders Placed This Encounter   Procedures    EKG 12 Lead     Order Specific Question:   Reason for Exam?     Answer: Other       Medications Prescribed:  No orders of the defined types were placed in this encounter. Discussed use, benefit, and side effects of prescribed medications. All patient questions answered. Pt voiced understanding. Instructed to continue current medications, diet and exercise. Continue risk factor modification and medical management. Patient agreed with treatment plan. Follow up as directed.     Electronically signed by Luis Pineda MD on 3/2/2018 at 11:18 AM

## 2018-03-15 RX ORDER — SODIUM BICARBONATE 650 MG/1
650 TABLET ORAL 2 TIMES DAILY
Qty: 60 TABLET | Refills: 0 | Status: ON HOLD | OUTPATIENT
Start: 2018-03-15 | End: 2018-04-17 | Stop reason: HOSPADM

## 2018-03-22 ASSESSMENT — ENCOUNTER SYMPTOMS
ABDOMINAL PAIN: 0
RHINORRHEA: 0
CONSTIPATION: 0
DIARRHEA: 0
VOMITING: 0
VOICE CHANGE: 0
TROUBLE SWALLOWING: 0
NAUSEA: 0
WHEEZING: 0
BACK PAIN: 0
SINUS PRESSURE: 0
SORE THROAT: 0
COUGH: 0

## 2018-03-29 ENCOUNTER — HOSPITAL ENCOUNTER (OUTPATIENT)
Age: 83
Discharge: HOME | End: 2018-03-29
Payer: MEDICARE

## 2018-03-29 DIAGNOSIS — N18.4 CKD (CHRONIC KIDNEY DISEASE) STAGE 4, GFR 15-29 ML/MIN (HCC): ICD-10-CM

## 2018-03-29 DIAGNOSIS — E55.9 VITAMIN D DEFICIENCY: ICD-10-CM

## 2018-03-29 DIAGNOSIS — E08.22 DIABETES MELLITUS DUE TO UNDERLYING CONDITION WITH STAGE 4 CHRONIC KIDNEY DISEASE, UNSPECIFIED LONG TERM INSULIN USE STATUS: ICD-10-CM

## 2018-03-29 DIAGNOSIS — N18.4 DIABETES MELLITUS DUE TO UNDERLYING CONDITION WITH STAGE 4 CHRONIC KIDNEY DISEASE, UNSPECIFIED LONG TERM INSULIN USE STATUS: ICD-10-CM

## 2018-03-29 LAB
ANION GAP SERPL CALCULATED.3IONS-SCNC: 14 MEQ/L (ref 8–16)
BUN BLDV-MCNC: 37 MG/DL (ref 7–22)
CALCIUM SERPL-MCNC: 9.6 MG/DL (ref 8.5–10.5)
CHLORIDE BLD-SCNC: 107 MEQ/L (ref 98–111)
CO2: 25 MEQ/L (ref 23–33)
CREAT SERPL-MCNC: 2.7 MG/DL (ref 0.4–1.2)
CREATININE URINE: 183.2 MG/DL
GFR SERPL CREATININE-BSD FRML MDRD: 23 ML/MIN/1.73M2
GLUCOSE BLD-MCNC: 106 MG/DL (ref 70–108)
PHOSPHORUS: 4.2 MG/DL (ref 2.4–4.7)
POTASSIUM SERPL-SCNC: 4.1 MEQ/L (ref 3.5–5.2)
PROT/CREAT RATIO, UR: 0.55
PROTEIN, URINE: 101.5 MG/DL
PTH INTACT: 151.4 PG/ML (ref 15–65)
SODIUM BLD-SCNC: 146 MEQ/L (ref 135–145)
VITAMIN D 25-HYDROXY: 36 NG/ML (ref 30–100)

## 2018-03-29 PROCEDURE — 84156 ASSAY OF PROTEIN URINE: CPT

## 2018-03-29 PROCEDURE — 36415 COLL VENOUS BLD VENIPUNCTURE: CPT

## 2018-03-29 PROCEDURE — 82570 ASSAY OF URINE CREATININE: CPT

## 2018-03-29 PROCEDURE — 84100 ASSAY OF PHOSPHORUS: CPT

## 2018-03-29 PROCEDURE — 83970 ASSAY OF PARATHORMONE: CPT

## 2018-03-29 PROCEDURE — 80048 BASIC METABOLIC PNL TOTAL CA: CPT

## 2018-03-29 PROCEDURE — 82306 VITAMIN D 25 HYDROXY: CPT

## 2018-04-09 ENCOUNTER — APPOINTMENT (OUTPATIENT)
Dept: CT IMAGING | Age: 83
DRG: 682 | End: 2018-04-09
Payer: MEDICARE

## 2018-04-09 ENCOUNTER — HOSPITAL ENCOUNTER (INPATIENT)
Age: 83
LOS: 7 days | Discharge: HOME HEALTH | DRG: 682 | End: 2018-04-17
Attending: FAMILY MEDICINE | Admitting: EMERGENCY MEDICINE
Payer: MEDICARE

## 2018-04-09 ENCOUNTER — OFFICE VISIT (OUTPATIENT)
Dept: NEPHROLOGY | Age: 83
End: 2018-04-09
Payer: MEDICARE

## 2018-04-09 VITALS
RESPIRATION RATE: 18 BRPM | WEIGHT: 163 LBS | DIASTOLIC BLOOD PRESSURE: 82 MMHG | BODY MASS INDEX: 24.78 KG/M2 | SYSTOLIC BLOOD PRESSURE: 136 MMHG | HEART RATE: 68 BPM

## 2018-04-09 DIAGNOSIS — N25.81 HYPERPARATHYROIDISM, SECONDARY RENAL (HCC): ICD-10-CM

## 2018-04-09 DIAGNOSIS — I10 ESSENTIAL HYPERTENSION: ICD-10-CM

## 2018-04-09 DIAGNOSIS — N18.4 DIABETES MELLITUS DUE TO UNDERLYING CONDITION WITH STAGE 4 CHRONIC KIDNEY DISEASE, UNSPECIFIED LONG TERM INSULIN USE STATUS: ICD-10-CM

## 2018-04-09 DIAGNOSIS — N18.4 CKD (CHRONIC KIDNEY DISEASE) STAGE 4, GFR 15-29 ML/MIN (HCC): Primary | ICD-10-CM

## 2018-04-09 DIAGNOSIS — E87.0 HYPERNATREMIA: ICD-10-CM

## 2018-04-09 DIAGNOSIS — K86.89 PANCREATIC MASS: ICD-10-CM

## 2018-04-09 DIAGNOSIS — N18.9 ACUTE RENAL FAILURE SUPERIMPOSED ON CHRONIC KIDNEY DISEASE, UNSPECIFIED CKD STAGE, UNSPECIFIED ACUTE RENAL FAILURE TYPE (HCC): Primary | ICD-10-CM

## 2018-04-09 DIAGNOSIS — N17.9 ACUTE RENAL FAILURE SUPERIMPOSED ON CHRONIC KIDNEY DISEASE, UNSPECIFIED CKD STAGE, UNSPECIFIED ACUTE RENAL FAILURE TYPE (HCC): Primary | ICD-10-CM

## 2018-04-09 DIAGNOSIS — E08.22 DIABETES MELLITUS DUE TO UNDERLYING CONDITION WITH STAGE 4 CHRONIC KIDNEY DISEASE, UNSPECIFIED LONG TERM INSULIN USE STATUS: ICD-10-CM

## 2018-04-09 DIAGNOSIS — N18.4 CKD (CHRONIC KIDNEY DISEASE) STAGE 4, GFR 15-29 ML/MIN (HCC): ICD-10-CM

## 2018-04-09 LAB
ALBUMIN SERPL-MCNC: 4.5 G/DL (ref 3.5–5.1)
ALP BLD-CCNC: 147 U/L (ref 38–126)
ALT SERPL-CCNC: 12 U/L (ref 11–66)
ANION GAP SERPL CALCULATED.3IONS-SCNC: 18 MEQ/L (ref 8–16)
APTT: 29.5 SECONDS (ref 22–38)
AST SERPL-CCNC: 19 U/L (ref 5–40)
BASOPHILS # BLD: 0 %
BASOPHILS ABSOLUTE: 0 THOU/MM3 (ref 0–0.1)
BILIRUB SERPL-MCNC: 0.6 MG/DL (ref 0.3–1.2)
BUN BLDV-MCNC: 52 MG/DL (ref 7–22)
CALCIUM SERPL-MCNC: 9 MG/DL (ref 8.5–10.5)
CHLORIDE BLD-SCNC: 102 MEQ/L (ref 98–111)
CO2: 24 MEQ/L (ref 23–33)
CREAT SERPL-MCNC: 4.2 MG/DL (ref 0.4–1.2)
EOSINOPHIL # BLD: 1.9 %
EOSINOPHILS ABSOLUTE: 0.1 THOU/MM3 (ref 0–0.4)
GFR SERPL CREATININE-BSD FRML MDRD: 14 ML/MIN/1.73M2
GLUCOSE BLD-MCNC: 143 MG/DL (ref 70–108)
HCT VFR BLD CALC: 35.3 % (ref 42–52)
HEMOGLOBIN: 12 GM/DL (ref 14–18)
INR BLD: 1.02 (ref 0.85–1.13)
LACTIC ACID: 1 MMOL/L (ref 0.5–2.2)
LIPASE: 76.1 U/L (ref 5.6–51.3)
LYMPHOCYTES # BLD: 12 %
LYMPHOCYTES ABSOLUTE: 0.9 THOU/MM3 (ref 1–4.8)
MCH RBC QN AUTO: 30.8 PG (ref 27–31)
MCHC RBC AUTO-ENTMCNC: 33.8 GM/DL (ref 33–37)
MCV RBC AUTO: 91 FL (ref 80–94)
MONOCYTES # BLD: 9.5 %
MONOCYTES ABSOLUTE: 0.7 THOU/MM3 (ref 0.4–1.3)
NUCLEATED RED BLOOD CELLS: 0 /100 WBC
OSMOLALITY CALCULATION: 303.4 MOSMOL/KG (ref 275–300)
PDW BLD-RTO: 14.2 % (ref 11.5–14.5)
PLATELET # BLD: 194 THOU/MM3 (ref 130–400)
PMV BLD AUTO: 7.9 FL (ref 7.4–10.4)
POTASSIUM REFLEX MAGNESIUM: 3.9 MEQ/L (ref 3.5–5.2)
PRO-BNP: 1693 PG/ML (ref 0–1800)
RBC # BLD: 3.88 MILL/MM3 (ref 4.7–6.1)
SEG NEUTROPHILS: 76.6 %
SEGMENTED NEUTROPHILS ABSOLUTE COUNT: 5.5 THOU/MM3 (ref 1.8–7.7)
SODIUM BLD-SCNC: 144 MEQ/L (ref 135–145)
TOTAL PROTEIN: 7 G/DL (ref 6.1–8)
TROPONIN T: 0.49 NG/ML
WBC # BLD: 7.2 THOU/MM3 (ref 4.8–10.8)

## 2018-04-09 PROCEDURE — 1036F TOBACCO NON-USER: CPT | Performed by: INTERNAL MEDICINE

## 2018-04-09 PROCEDURE — 84484 ASSAY OF TROPONIN QUANT: CPT

## 2018-04-09 PROCEDURE — 1123F ACP DISCUSS/DSCN MKR DOCD: CPT | Performed by: INTERNAL MEDICINE

## 2018-04-09 PROCEDURE — G8598 ASA/ANTIPLAT THER USED: HCPCS | Performed by: INTERNAL MEDICINE

## 2018-04-09 PROCEDURE — 2580000003 HC RX 258: Performed by: FAMILY MEDICINE

## 2018-04-09 PROCEDURE — 96374 THER/PROPH/DIAG INJ IV PUSH: CPT

## 2018-04-09 PROCEDURE — 83690 ASSAY OF LIPASE: CPT

## 2018-04-09 PROCEDURE — 36415 COLL VENOUS BLD VENIPUNCTURE: CPT

## 2018-04-09 PROCEDURE — G8420 CALC BMI NORM PARAMETERS: HCPCS | Performed by: INTERNAL MEDICINE

## 2018-04-09 PROCEDURE — 81001 URINALYSIS AUTO W/SCOPE: CPT

## 2018-04-09 PROCEDURE — 74176 CT ABD & PELVIS W/O CONTRAST: CPT

## 2018-04-09 PROCEDURE — 83880 ASSAY OF NATRIURETIC PEPTIDE: CPT

## 2018-04-09 PROCEDURE — 99285 EMERGENCY DEPT VISIT HI MDM: CPT

## 2018-04-09 PROCEDURE — 6360000002 HC RX W HCPCS: Performed by: FAMILY MEDICINE

## 2018-04-09 PROCEDURE — 80053 COMPREHEN METABOLIC PANEL: CPT

## 2018-04-09 PROCEDURE — G8427 DOCREV CUR MEDS BY ELIG CLIN: HCPCS | Performed by: INTERNAL MEDICINE

## 2018-04-09 PROCEDURE — 85610 PROTHROMBIN TIME: CPT

## 2018-04-09 PROCEDURE — 83036 HEMOGLOBIN GLYCOSYLATED A1C: CPT

## 2018-04-09 PROCEDURE — 85730 THROMBOPLASTIN TIME PARTIAL: CPT

## 2018-04-09 PROCEDURE — 83605 ASSAY OF LACTIC ACID: CPT

## 2018-04-09 PROCEDURE — 85025 COMPLETE CBC W/AUTO DIFF WBC: CPT

## 2018-04-09 PROCEDURE — 99214 OFFICE O/P EST MOD 30 MIN: CPT | Performed by: INTERNAL MEDICINE

## 2018-04-09 PROCEDURE — 4040F PNEUMOC VAC/ADMIN/RCVD: CPT | Performed by: INTERNAL MEDICINE

## 2018-04-09 PROCEDURE — 93005 ELECTROCARDIOGRAM TRACING: CPT | Performed by: FAMILY MEDICINE

## 2018-04-09 RX ORDER — 0.9 % SODIUM CHLORIDE 0.9 %
1000 INTRAVENOUS SOLUTION INTRAVENOUS ONCE
Status: DISCONTINUED | OUTPATIENT
Start: 2018-04-09 | End: 2018-04-17 | Stop reason: HOSPADM

## 2018-04-09 RX ORDER — ONDANSETRON 2 MG/ML
4 INJECTION INTRAMUSCULAR; INTRAVENOUS EVERY 30 MIN PRN
Status: DISCONTINUED | OUTPATIENT
Start: 2018-04-09 | End: 2018-04-10 | Stop reason: DRUGHIGH

## 2018-04-09 RX ORDER — GABAPENTIN 100 MG/1
100 CAPSULE ORAL 4 TIMES DAILY
COMMUNITY
End: 2018-05-14

## 2018-04-09 RX ORDER — 0.9 % SODIUM CHLORIDE 0.9 %
500 INTRAVENOUS SOLUTION INTRAVENOUS ONCE
Status: COMPLETED | OUTPATIENT
Start: 2018-04-09 | End: 2018-04-09

## 2018-04-09 RX ADMIN — SODIUM CHLORIDE 500 ML: 9 INJECTION, SOLUTION INTRAVENOUS at 21:46

## 2018-04-09 RX ADMIN — ONDANSETRON 4 MG: 2 INJECTION INTRAMUSCULAR; INTRAVENOUS at 21:46

## 2018-04-10 ENCOUNTER — APPOINTMENT (OUTPATIENT)
Dept: ULTRASOUND IMAGING | Age: 83
DRG: 682 | End: 2018-04-10
Payer: MEDICARE

## 2018-04-10 PROBLEM — R11.2 NAUSEA AND VOMITING: Status: ACTIVE | Noted: 2018-04-10

## 2018-04-10 PROBLEM — N17.9 AKI (ACUTE KIDNEY INJURY) (HCC): Status: ACTIVE | Noted: 2018-04-10

## 2018-04-10 PROBLEM — E86.0 DEHYDRATION, MODERATE: Status: ACTIVE | Noted: 2018-04-10

## 2018-04-10 PROBLEM — R10.9 ABDOMINAL PAIN: Status: ACTIVE | Noted: 2018-04-10

## 2018-04-10 PROBLEM — K52.9 GE (GASTROENTERITIS): Status: ACTIVE | Noted: 2018-04-10

## 2018-04-10 LAB
ANION GAP SERPL CALCULATED.3IONS-SCNC: 12 MEQ/L (ref 8–16)
AVERAGE GLUCOSE: 108 MG/DL (ref 70–126)
BACTERIA: ABNORMAL /HPF
BILIRUBIN URINE: NEGATIVE
BLOOD, URINE: ABNORMAL
BUN BLDV-MCNC: 48 MG/DL (ref 7–22)
CALCIUM SERPL-MCNC: 8.2 MG/DL (ref 8.5–10.5)
CASTS 2: ABNORMAL /LPF
CASTS UA: ABNORMAL /LPF
CEA: 4.8 NG/ML (ref 0–5)
CHARACTER, URINE: CLEAR
CHLORIDE BLD-SCNC: 110 MEQ/L (ref 98–111)
CO2: 21 MEQ/L (ref 23–33)
COLOR: YELLOW
CREAT SERPL-MCNC: 3.8 MG/DL (ref 0.4–1.2)
CRYSTALS, UA: ABNORMAL
EKG ATRIAL RATE: 103 BPM
EKG ATRIAL RATE: 106 BPM
EKG P AXIS: 54 DEGREES
EKG P AXIS: 60 DEGREES
EKG P-R INTERVAL: 166 MS
EKG P-R INTERVAL: 168 MS
EKG Q-T INTERVAL: 344 MS
EKG Q-T INTERVAL: 348 MS
EKG QRS DURATION: 86 MS
EKG QRS DURATION: 88 MS
EKG QTC CALCULATION (BAZETT): 455 MS
EKG QTC CALCULATION (BAZETT): 456 MS
EKG R AXIS: -59 DEGREES
EKG R AXIS: -62 DEGREES
EKG T AXIS: 3 DEGREES
EKG T AXIS: 37 DEGREES
EKG VENTRICULAR RATE: 103 BPM
EKG VENTRICULAR RATE: 106 BPM
EPITHELIAL CELLS, UA: ABNORMAL /HPF
GFR SERPL CREATININE-BSD FRML MDRD: 15 ML/MIN/1.73M2
GLUCOSE BLD-MCNC: 115 MG/DL (ref 70–108)
GLUCOSE BLD-MCNC: 144 MG/DL (ref 70–108)
GLUCOSE BLD-MCNC: 150 MG/DL (ref 70–108)
GLUCOSE BLD-MCNC: 75 MG/DL (ref 70–108)
GLUCOSE BLD-MCNC: 93 MG/DL (ref 70–108)
GLUCOSE URINE: NEGATIVE MG/DL
HBA1C MFR BLD: 5.6 % (ref 4.4–6.4)
KETONES, URINE: ABNORMAL
LEUKOCYTE ESTERASE, URINE: NEGATIVE
MISCELLANEOUS 2: ABNORMAL
MRSA SCREEN RT-PCR: NEGATIVE
NITRITE, URINE: NEGATIVE
PH UA: 5.5
POTASSIUM SERPL-SCNC: 3.9 MEQ/L (ref 3.5–5.2)
PROTEIN UA: 100
RBC URINE: ABNORMAL /HPF
RENAL EPITHELIAL, UA: ABNORMAL
SODIUM BLD-SCNC: 143 MEQ/L (ref 135–145)
SODIUM URINE: 68 MEQ/L
SPECIFIC GRAVITY, URINE: 1.02 (ref 1–1.03)
UROBILINOGEN, URINE: 0.2 EU/DL
WBC UA: ABNORMAL /HPF
YEAST: ABNORMAL

## 2018-04-10 PROCEDURE — 82948 REAGENT STRIP/BLOOD GLUCOSE: CPT

## 2018-04-10 PROCEDURE — 6370000000 HC RX 637 (ALT 250 FOR IP): Performed by: INTERNAL MEDICINE

## 2018-04-10 PROCEDURE — 87081 CULTURE SCREEN ONLY: CPT

## 2018-04-10 PROCEDURE — 2580000003 HC RX 258: Performed by: INTERNAL MEDICINE

## 2018-04-10 PROCEDURE — 86301 IMMUNOASSAY TUMOR CA 19-9: CPT

## 2018-04-10 PROCEDURE — G8978 MOBILITY CURRENT STATUS: HCPCS

## 2018-04-10 PROCEDURE — 80048 BASIC METABOLIC PNL TOTAL CA: CPT

## 2018-04-10 PROCEDURE — 1200000003 HC TELEMETRY R&B

## 2018-04-10 PROCEDURE — 93010 ELECTROCARDIOGRAM REPORT: CPT | Performed by: NUCLEAR MEDICINE

## 2018-04-10 PROCEDURE — 99221 1ST HOSP IP/OBS SF/LOW 40: CPT | Performed by: INTERNAL MEDICINE

## 2018-04-10 PROCEDURE — 6360000002 HC RX W HCPCS: Performed by: INTERNAL MEDICINE

## 2018-04-10 PROCEDURE — 76700 US EXAM ABDOM COMPLETE: CPT

## 2018-04-10 PROCEDURE — 87641 MR-STAPH DNA AMP PROBE: CPT

## 2018-04-10 PROCEDURE — 82378 CARCINOEMBRYONIC ANTIGEN: CPT

## 2018-04-10 PROCEDURE — 97110 THERAPEUTIC EXERCISES: CPT

## 2018-04-10 PROCEDURE — 99223 1ST HOSP IP/OBS HIGH 75: CPT | Performed by: INTERNAL MEDICINE

## 2018-04-10 PROCEDURE — 36415 COLL VENOUS BLD VENIPUNCTURE: CPT

## 2018-04-10 PROCEDURE — 84300 ASSAY OF URINE SODIUM: CPT

## 2018-04-10 PROCEDURE — G8979 MOBILITY GOAL STATUS: HCPCS

## 2018-04-10 PROCEDURE — 97161 PT EVAL LOW COMPLEX 20 MIN: CPT

## 2018-04-10 RX ORDER — OXYCODONE AND ACETAMINOPHEN 10; 325 MG/1; MG/1
1 TABLET ORAL EVERY 8 HOURS PRN
Status: DISCONTINUED | OUTPATIENT
Start: 2018-04-10 | End: 2018-04-17 | Stop reason: HOSPADM

## 2018-04-10 RX ORDER — VITAMIN E 268 MG
400 CAPSULE ORAL DAILY
Status: DISCONTINUED | OUTPATIENT
Start: 2018-04-10 | End: 2018-04-17 | Stop reason: HOSPADM

## 2018-04-10 RX ORDER — SODIUM BICARBONATE 650 MG/1
650 TABLET ORAL 2 TIMES DAILY
Status: DISCONTINUED | OUTPATIENT
Start: 2018-04-10 | End: 2018-04-11

## 2018-04-10 RX ORDER — METOPROLOL SUCCINATE 50 MG/1
50 TABLET, EXTENDED RELEASE ORAL DAILY PRN
Status: DISCONTINUED | OUTPATIENT
Start: 2018-04-10 | End: 2018-04-10

## 2018-04-10 RX ORDER — GABAPENTIN 100 MG/1
100 CAPSULE ORAL 2 TIMES DAILY
Status: DISCONTINUED | OUTPATIENT
Start: 2018-04-10 | End: 2018-04-17 | Stop reason: HOSPADM

## 2018-04-10 RX ORDER — DOXAZOSIN MESYLATE 1 MG/1
1 TABLET ORAL DAILY
Status: DISCONTINUED | OUTPATIENT
Start: 2018-04-10 | End: 2018-04-10

## 2018-04-10 RX ORDER — SODIUM CHLORIDE 9 MG/ML
INJECTION, SOLUTION INTRAVENOUS CONTINUOUS
Status: ACTIVE | OUTPATIENT
Start: 2018-04-10 | End: 2018-04-11

## 2018-04-10 RX ORDER — DOXAZOSIN MESYLATE 1 MG/1
1 TABLET ORAL DAILY
Status: DISCONTINUED | OUTPATIENT
Start: 2018-04-10 | End: 2018-04-10 | Stop reason: DRUGHIGH

## 2018-04-10 RX ORDER — DEXTROSE MONOHYDRATE 25 G/50ML
12.5 INJECTION, SOLUTION INTRAVENOUS PRN
Status: DISCONTINUED | OUTPATIENT
Start: 2018-04-10 | End: 2018-04-17 | Stop reason: HOSPADM

## 2018-04-10 RX ORDER — GABAPENTIN 100 MG/1
100 CAPSULE ORAL 4 TIMES DAILY
Status: DISCONTINUED | OUTPATIENT
Start: 2018-04-10 | End: 2018-04-10

## 2018-04-10 RX ORDER — METOPROLOL SUCCINATE 25 MG/1
25 TABLET, EXTENDED RELEASE ORAL DAILY
Status: DISCONTINUED | OUTPATIENT
Start: 2018-04-10 | End: 2018-04-14

## 2018-04-10 RX ORDER — NIFEDIPINE 90 MG/1
90 TABLET, EXTENDED RELEASE ORAL DAILY
Status: DISCONTINUED | OUTPATIENT
Start: 2018-04-10 | End: 2018-04-10

## 2018-04-10 RX ORDER — LABETALOL HYDROCHLORIDE 5 MG/ML
5 INJECTION, SOLUTION INTRAVENOUS EVERY 4 HOURS PRN
Status: DISCONTINUED | OUTPATIENT
Start: 2018-04-10 | End: 2018-04-17 | Stop reason: HOSPADM

## 2018-04-10 RX ORDER — DOXAZOSIN MESYLATE 4 MG/1
4 TABLET ORAL EVERY 12 HOURS SCHEDULED
Status: DISCONTINUED | OUTPATIENT
Start: 2018-04-10 | End: 2018-04-11

## 2018-04-10 RX ORDER — POTASSIUM CHLORIDE 20MEQ/15ML
40 LIQUID (ML) ORAL PRN
Status: DISCONTINUED | OUTPATIENT
Start: 2018-04-10 | End: 2018-04-17 | Stop reason: HOSPADM

## 2018-04-10 RX ORDER — SODIUM CHLORIDE 0.9 % (FLUSH) 0.9 %
10 SYRINGE (ML) INJECTION PRN
Status: DISCONTINUED | OUTPATIENT
Start: 2018-04-10 | End: 2018-04-17 | Stop reason: HOSPADM

## 2018-04-10 RX ORDER — ONDANSETRON 2 MG/ML
4 INJECTION INTRAMUSCULAR; INTRAVENOUS EVERY 6 HOURS PRN
Status: DISCONTINUED | OUTPATIENT
Start: 2018-04-10 | End: 2018-04-17 | Stop reason: HOSPADM

## 2018-04-10 RX ORDER — POTASSIUM CHLORIDE 7.45 MG/ML
10 INJECTION INTRAVENOUS PRN
Status: DISCONTINUED | OUTPATIENT
Start: 2018-04-10 | End: 2018-04-17 | Stop reason: HOSPADM

## 2018-04-10 RX ORDER — POTASSIUM CHLORIDE 20 MEQ/1
40 TABLET, EXTENDED RELEASE ORAL PRN
Status: DISCONTINUED | OUTPATIENT
Start: 2018-04-10 | End: 2018-04-17 | Stop reason: HOSPADM

## 2018-04-10 RX ORDER — NICOTINE POLACRILEX 4 MG
15 LOZENGE BUCCAL PRN
Status: DISCONTINUED | OUTPATIENT
Start: 2018-04-10 | End: 2018-04-17 | Stop reason: HOSPADM

## 2018-04-10 RX ORDER — DOCUSATE SODIUM 100 MG/1
100 CAPSULE, LIQUID FILLED ORAL 2 TIMES DAILY
Status: DISCONTINUED | OUTPATIENT
Start: 2018-04-10 | End: 2018-04-17 | Stop reason: HOSPADM

## 2018-04-10 RX ORDER — NIFEDIPINE 90 MG/1
90 TABLET, EXTENDED RELEASE ORAL DAILY
Status: DISCONTINUED | OUTPATIENT
Start: 2018-04-10 | End: 2018-04-12

## 2018-04-10 RX ORDER — BISACODYL 10 MG
10 SUPPOSITORY, RECTAL RECTAL DAILY PRN
Status: DISCONTINUED | OUTPATIENT
Start: 2018-04-10 | End: 2018-04-17 | Stop reason: HOSPADM

## 2018-04-10 RX ORDER — SODIUM CHLORIDE 0.9 % (FLUSH) 0.9 %
10 SYRINGE (ML) INJECTION EVERY 12 HOURS SCHEDULED
Status: DISCONTINUED | OUTPATIENT
Start: 2018-04-10 | End: 2018-04-17 | Stop reason: HOSPADM

## 2018-04-10 RX ORDER — DEXTROSE MONOHYDRATE 50 MG/ML
100 INJECTION, SOLUTION INTRAVENOUS PRN
Status: DISCONTINUED | OUTPATIENT
Start: 2018-04-10 | End: 2018-04-17 | Stop reason: HOSPADM

## 2018-04-10 RX ORDER — ACETAMINOPHEN 325 MG/1
650 TABLET ORAL EVERY 4 HOURS PRN
Status: DISCONTINUED | OUTPATIENT
Start: 2018-04-10 | End: 2018-04-17 | Stop reason: HOSPADM

## 2018-04-10 RX ADMIN — GABAPENTIN 100 MG: 100 CAPSULE ORAL at 12:37

## 2018-04-10 RX ADMIN — NIFEDIPINE 90 MG: 90 TABLET, FILM COATED, EXTENDED RELEASE ORAL at 08:44

## 2018-04-10 RX ADMIN — SODIUM BICARBONATE 650 MG: 650 TABLET ORAL at 20:02

## 2018-04-10 RX ADMIN — PIPERACILLIN SODIUM AND TAZOBACTAM SODIUM 3.38 G: 3; .375 INJECTION, POWDER, LYOPHILIZED, FOR SOLUTION INTRAVENOUS at 19:56

## 2018-04-10 RX ADMIN — SODIUM BICARBONATE 650 MG: 650 TABLET ORAL at 08:43

## 2018-04-10 RX ADMIN — SODIUM CHLORIDE: 9 INJECTION, SOLUTION INTRAVENOUS at 03:07

## 2018-04-10 RX ADMIN — VITAMIN E CAP 400 UNIT 400 UNITS: 400 CAP at 08:44

## 2018-04-10 RX ADMIN — DOXAZOSIN 1 MG: 1 TABLET ORAL at 08:44

## 2018-04-10 RX ADMIN — CLONIDINE HYDROCHLORIDE 0.3 MG: 0.2 TABLET ORAL at 12:35

## 2018-04-10 RX ADMIN — DOCUSATE SODIUM 100 MG: 100 CAPSULE ORAL at 08:44

## 2018-04-10 RX ADMIN — VITAMIN D, TAB 1000IU (100/BT) 2000 UNITS: 25 TAB at 08:44

## 2018-04-10 RX ADMIN — SODIUM CHLORIDE: 9 INJECTION, SOLUTION INTRAVENOUS at 12:39

## 2018-04-10 RX ADMIN — GABAPENTIN 100 MG: 100 CAPSULE ORAL at 08:44

## 2018-04-10 RX ADMIN — METOPROLOL SUCCINATE 25 MG: 25 TABLET, FILM COATED, EXTENDED RELEASE ORAL at 12:35

## 2018-04-10 RX ADMIN — GABAPENTIN 100 MG: 100 CAPSULE ORAL at 20:02

## 2018-04-10 ASSESSMENT — ENCOUNTER SYMPTOMS
WHEEZING: 0
VOMITING: 1
RHINORRHEA: 0
EYE DISCHARGE: 0
SORE THROAT: 0
NAUSEA: 1
ABDOMINAL PAIN: 0
BACK PAIN: 0
COUGH: 0
SHORTNESS OF BREATH: 0
DIARRHEA: 0
EYE REDNESS: 0

## 2018-04-10 ASSESSMENT — PAIN SCALES - GENERAL
PAINLEVEL_OUTOF10: 0

## 2018-04-11 ENCOUNTER — ANESTHESIA EVENT (OUTPATIENT)
Dept: ENDOSCOPY | Age: 83
DRG: 682 | End: 2018-04-11
Payer: MEDICARE

## 2018-04-11 ENCOUNTER — ANESTHESIA (OUTPATIENT)
Dept: ENDOSCOPY | Age: 83
DRG: 682 | End: 2018-04-11
Payer: MEDICARE

## 2018-04-11 ENCOUNTER — APPOINTMENT (OUTPATIENT)
Dept: GENERAL RADIOLOGY | Age: 83
DRG: 682 | End: 2018-04-11
Payer: MEDICARE

## 2018-04-11 VITALS
DIASTOLIC BLOOD PRESSURE: 48 MMHG | OXYGEN SATURATION: 94 % | RESPIRATION RATE: 14 BRPM | SYSTOLIC BLOOD PRESSURE: 99 MMHG

## 2018-04-11 PROBLEM — N17.9 ACUTE RENAL FAILURE SUPERIMPOSED ON STAGE 4 CHRONIC KIDNEY DISEASE (HCC): Status: ACTIVE | Noted: 2018-04-11

## 2018-04-11 PROBLEM — R04.0 EPISTAXIS: Status: RESOLVED | Noted: 2017-01-10 | Resolved: 2018-04-11

## 2018-04-11 PROBLEM — N18.4 ACUTE RENAL FAILURE SUPERIMPOSED ON STAGE 4 CHRONIC KIDNEY DISEASE (HCC): Status: ACTIVE | Noted: 2018-04-11

## 2018-04-11 PROBLEM — K80.20 GALLSTONES: Status: ACTIVE | Noted: 2018-04-09

## 2018-04-11 LAB
ALBUMIN SERPL-MCNC: 3.1 G/DL (ref 3.5–5.1)
ALP BLD-CCNC: 153 U/L (ref 38–126)
ALT SERPL-CCNC: 14 U/L (ref 11–66)
ANION GAP SERPL CALCULATED.3IONS-SCNC: 14 MEQ/L (ref 8–16)
ANISOCYTOSIS: ABNORMAL
AST SERPL-CCNC: 23 U/L (ref 5–40)
BASOPHILS # BLD: 0.4 %
BASOPHILS ABSOLUTE: 0 THOU/MM3 (ref 0–0.1)
BILIRUB SERPL-MCNC: 0.7 MG/DL (ref 0.3–1.2)
BUN BLDV-MCNC: 46 MG/DL (ref 7–22)
CA 19-9: 31 U/ML (ref 0–35)
CA 19-9: 45 U/ML (ref 0–35)
CALCIUM SERPL-MCNC: 8 MG/DL (ref 8.5–10.5)
CEA: 6.2 NG/ML (ref 0–5)
CHLORIDE BLD-SCNC: 105 MEQ/L (ref 98–111)
CO2: 19 MEQ/L (ref 23–33)
CREAT SERPL-MCNC: 3.7 MG/DL (ref 0.4–1.2)
EOSINOPHIL # BLD: 3.9 %
EOSINOPHILS ABSOLUTE: 0.3 THOU/MM3 (ref 0–0.4)
GFR SERPL CREATININE-BSD FRML MDRD: 16 ML/MIN/1.73M2
GLUCOSE BLD-MCNC: 102 MG/DL (ref 70–108)
GLUCOSE BLD-MCNC: 134 MG/DL (ref 70–108)
GLUCOSE BLD-MCNC: 174 MG/DL (ref 70–108)
GLUCOSE BLD-MCNC: 67 MG/DL (ref 70–108)
GLUCOSE BLD-MCNC: 70 MG/DL (ref 70–108)
GLUCOSE BLD-MCNC: 79 MG/DL (ref 70–108)
GLUCOSE BLD-MCNC: 81 MG/DL (ref 70–108)
HCT VFR BLD CALC: 30 % (ref 42–52)
HEMOGLOBIN: 9.9 GM/DL (ref 14–18)
LIPASE: 58.8 U/L (ref 5.6–51.3)
LYMPHOCYTES # BLD: 17.7 %
LYMPHOCYTES ABSOLUTE: 1.2 THOU/MM3 (ref 1–4.8)
MCH RBC QN AUTO: 30.8 PG (ref 27–31)
MCHC RBC AUTO-ENTMCNC: 33 GM/DL (ref 33–37)
MCV RBC AUTO: 93.3 FL (ref 80–94)
MONOCYTES # BLD: 9.9 %
MONOCYTES ABSOLUTE: 0.7 THOU/MM3 (ref 0.4–1.3)
NUCLEATED RED BLOOD CELLS: 0 /100 WBC
PDW BLD-RTO: 14.6 % (ref 11.5–14.5)
PLATELET # BLD: 154 THOU/MM3 (ref 130–400)
PMV BLD AUTO: 8.7 FL (ref 7.4–10.4)
POTASSIUM REFLEX MAGNESIUM: 3.8 MEQ/L (ref 3.5–5.2)
POTASSIUM SERPL-SCNC: 3.8 MEQ/L (ref 3.5–5.2)
RBC # BLD: 3.21 MILL/MM3 (ref 4.7–6.1)
SEG NEUTROPHILS: 68.1 %
SEGMENTED NEUTROPHILS ABSOLUTE COUNT: 4.6 THOU/MM3 (ref 1.8–7.7)
SODIUM BLD-SCNC: 138 MEQ/L (ref 135–145)
TOTAL PROTEIN: 5.4 G/DL (ref 6.1–8)
WBC # BLD: 6.7 THOU/MM3 (ref 4.8–10.8)

## 2018-04-11 PROCEDURE — 74328 X-RAY BILE DUCT ENDOSCOPY: CPT

## 2018-04-11 PROCEDURE — 3609015200 HC ERCP REMOVE CALCULI/DEBRIS BILIARY/PANCREAS DUCT: Performed by: INTERNAL MEDICINE

## 2018-04-11 PROCEDURE — 2700000000 HC OXYGEN THERAPY PER DAY

## 2018-04-11 PROCEDURE — 3700000001 HC ADD 15 MINUTES (ANESTHESIA): Performed by: INTERNAL MEDICINE

## 2018-04-11 PROCEDURE — 2720000010 HC SURG SUPPLY STERILE: Performed by: INTERNAL MEDICINE

## 2018-04-11 PROCEDURE — 80048 BASIC METABOLIC PNL TOTAL CA: CPT

## 2018-04-11 PROCEDURE — 7100000001 HC PACU RECOVERY - ADDTL 15 MIN: Performed by: INTERNAL MEDICINE

## 2018-04-11 PROCEDURE — 2580000003 HC RX 258: Performed by: NURSE ANESTHETIST, CERTIFIED REGISTERED

## 2018-04-11 PROCEDURE — 0F798DZ DILATION OF COMMON BILE DUCT WITH INTRALUMINAL DEVICE, VIA NATURAL OR ARTIFICIAL OPENING ENDOSCOPIC: ICD-10-PCS | Performed by: INTERNAL MEDICINE

## 2018-04-11 PROCEDURE — C2625 STENT, NON-COR, TEM W/DEL SY: HCPCS | Performed by: INTERNAL MEDICINE

## 2018-04-11 PROCEDURE — 6360000002 HC RX W HCPCS: Performed by: INTERNAL MEDICINE

## 2018-04-11 PROCEDURE — 86301 IMMUNOASSAY TUMOR CA 19-9: CPT

## 2018-04-11 PROCEDURE — BF101ZZ FLUOROSCOPY OF BILE DUCTS USING LOW OSMOLAR CONTRAST: ICD-10-PCS | Performed by: INTERNAL MEDICINE

## 2018-04-11 PROCEDURE — 85025 COMPLETE CBC W/AUTO DIFF WBC: CPT

## 2018-04-11 PROCEDURE — 6370000000 HC RX 637 (ALT 250 FOR IP): Performed by: INTERNAL MEDICINE

## 2018-04-11 PROCEDURE — 82948 REAGENT STRIP/BLOOD GLUCOSE: CPT

## 2018-04-11 PROCEDURE — 3609015100 HC ERCP STENT PLACEMENT BILIARY/PANCREATIC DUCT: Performed by: INTERNAL MEDICINE

## 2018-04-11 PROCEDURE — 6370000000 HC RX 637 (ALT 250 FOR IP): Performed by: NURSE PRACTITIONER

## 2018-04-11 PROCEDURE — 1200000003 HC TELEMETRY R&B

## 2018-04-11 PROCEDURE — 99232 SBSQ HOSP IP/OBS MODERATE 35: CPT | Performed by: NURSE PRACTITIONER

## 2018-04-11 PROCEDURE — 7100000000 HC PACU RECOVERY - FIRST 15 MIN: Performed by: INTERNAL MEDICINE

## 2018-04-11 PROCEDURE — 36415 COLL VENOUS BLD VENIPUNCTURE: CPT

## 2018-04-11 PROCEDURE — 80053 COMPREHEN METABOLIC PANEL: CPT

## 2018-04-11 PROCEDURE — 2580000003 HC RX 258: Performed by: INTERNAL MEDICINE

## 2018-04-11 PROCEDURE — 6360000002 HC RX W HCPCS: Performed by: NURSE ANESTHETIST, CERTIFIED REGISTERED

## 2018-04-11 PROCEDURE — 82378 CARCINOEMBRYONIC ANTIGEN: CPT

## 2018-04-11 PROCEDURE — 83690 ASSAY OF LIPASE: CPT

## 2018-04-11 PROCEDURE — 3700000000 HC ANESTHESIA ATTENDED CARE: Performed by: INTERNAL MEDICINE

## 2018-04-11 DEVICE — BILIARY STENT WITH NAVIFLEXTM RX DELIVERY SYSTEM
Type: IMPLANTABLE DEVICE | Status: FUNCTIONAL
Brand: ADVANIX™ BILIARY

## 2018-04-11 RX ORDER — DEXTROSE AND SODIUM CHLORIDE 5; .9 G/100ML; G/100ML
INJECTION, SOLUTION INTRAVENOUS PRN
Status: DISCONTINUED | OUTPATIENT
Start: 2018-04-11 | End: 2018-04-11 | Stop reason: SDUPTHER

## 2018-04-11 RX ORDER — DEXTROSE AND SODIUM CHLORIDE 5; .9 G/100ML; G/100ML
INJECTION, SOLUTION INTRAVENOUS CONTINUOUS
Status: DISCONTINUED | OUTPATIENT
Start: 2018-04-11 | End: 2018-04-12

## 2018-04-11 RX ORDER — CLONIDINE HYDROCHLORIDE 0.2 MG/1
0.2 TABLET ORAL 2 TIMES DAILY
Status: DISCONTINUED | OUTPATIENT
Start: 2018-04-11 | End: 2018-04-17 | Stop reason: HOSPADM

## 2018-04-11 RX ORDER — SODIUM BICARBONATE 650 MG/1
1300 TABLET ORAL 2 TIMES DAILY WITH MEALS
Status: DISCONTINUED | OUTPATIENT
Start: 2018-04-11 | End: 2018-04-13

## 2018-04-11 RX ORDER — PROPOFOL 10 MG/ML
INJECTION, EMULSION INTRAVENOUS PRN
Status: DISCONTINUED | OUTPATIENT
Start: 2018-04-11 | End: 2018-04-11 | Stop reason: SDUPTHER

## 2018-04-11 RX ADMIN — GABAPENTIN 100 MG: 100 CAPSULE ORAL at 08:07

## 2018-04-11 RX ADMIN — CLONIDINE HYDROCHLORIDE 0.3 MG: 0.2 TABLET ORAL at 08:07

## 2018-04-11 RX ADMIN — CLONIDINE HYDROCHLORIDE 0.2 MG: 0.2 TABLET ORAL at 20:17

## 2018-04-11 RX ADMIN — GABAPENTIN 100 MG: 100 CAPSULE ORAL at 20:17

## 2018-04-11 RX ADMIN — NIFEDIPINE 90 MG: 90 TABLET, FILM COATED, EXTENDED RELEASE ORAL at 08:07

## 2018-04-11 RX ADMIN — DOXAZOSIN 4 MG: 4 TABLET ORAL at 08:07

## 2018-04-11 RX ADMIN — PROPOFOL 20 MG: 10 INJECTION, EMULSION INTRAVENOUS at 14:24

## 2018-04-11 RX ADMIN — PIPERACILLIN SODIUM AND TAZOBACTAM SODIUM 3.38 G: 3; .375 INJECTION, POWDER, LYOPHILIZED, FOR SOLUTION INTRAVENOUS at 06:26

## 2018-04-11 RX ADMIN — PIPERACILLIN SODIUM AND TAZOBACTAM SODIUM 3.38 G: 3; .375 INJECTION, POWDER, LYOPHILIZED, FOR SOLUTION INTRAVENOUS at 18:38

## 2018-04-11 RX ADMIN — PROPOFOL 20 MG: 10 INJECTION, EMULSION INTRAVENOUS at 14:16

## 2018-04-11 RX ADMIN — DEXTROSE AND SODIUM CHLORIDE: 5; 900 INJECTION, SOLUTION INTRAVENOUS at 08:07

## 2018-04-11 RX ADMIN — PROPOFOL 10 MG: 10 INJECTION, EMULSION INTRAVENOUS at 14:31

## 2018-04-11 RX ADMIN — PROPOFOL 10 MG: 10 INJECTION, EMULSION INTRAVENOUS at 14:20

## 2018-04-11 RX ADMIN — METOPROLOL SUCCINATE 25 MG: 25 TABLET, FILM COATED, EXTENDED RELEASE ORAL at 08:07

## 2018-04-11 RX ADMIN — PROPOFOL 20 MG: 10 INJECTION, EMULSION INTRAVENOUS at 14:28

## 2018-04-11 RX ADMIN — DEXTROSE AND SODIUM CHLORIDE 400 ML: 5; 900 INJECTION, SOLUTION INTRAVENOUS at 14:41

## 2018-04-11 RX ADMIN — PROPOFOL 50 MG: 10 INJECTION, EMULSION INTRAVENOUS at 14:15

## 2018-04-11 RX ADMIN — SODIUM BICARBONATE 650 MG: 650 TABLET ORAL at 08:07

## 2018-04-12 ENCOUNTER — APPOINTMENT (OUTPATIENT)
Dept: NUCLEAR MEDICINE | Age: 83
DRG: 682 | End: 2018-04-12
Payer: MEDICARE

## 2018-04-12 ENCOUNTER — APPOINTMENT (OUTPATIENT)
Dept: GENERAL RADIOLOGY | Age: 83
DRG: 682 | End: 2018-04-12
Payer: MEDICARE

## 2018-04-12 ENCOUNTER — APPOINTMENT (OUTPATIENT)
Dept: INTERVENTIONAL RADIOLOGY/VASCULAR | Age: 83
DRG: 682 | End: 2018-04-12
Payer: MEDICARE

## 2018-04-12 PROBLEM — J81.1 PULMONARY EDEMA: Status: ACTIVE | Noted: 2018-04-12

## 2018-04-12 PROBLEM — J96.01 ACUTE RESPIRATORY FAILURE WITH HYPOXIA (HCC): Status: ACTIVE | Noted: 2018-04-12

## 2018-04-12 LAB
ALLEN TEST: POSITIVE
ANION GAP SERPL CALCULATED.3IONS-SCNC: 16 MEQ/L (ref 8–16)
BASE EXCESS (CALCULATED): -6.1 MMOL/L (ref -2.5–2.5)
BASOPHILS # BLD: 0.6 %
BASOPHILS ABSOLUTE: 0.1 THOU/MM3 (ref 0–0.1)
BUN BLDV-MCNC: 41 MG/DL (ref 7–22)
CALCIUM SERPL-MCNC: 8.1 MG/DL (ref 8.5–10.5)
CHLORIDE BLD-SCNC: 113 MEQ/L (ref 98–111)
CK MB: 14.3 NG/ML
CK MB: 20.2 NG/ML
CO2: 14 MEQ/L (ref 23–33)
COLLECTED BY:: ABNORMAL
CREAT SERPL-MCNC: 3.5 MG/DL (ref 0.4–1.2)
D-DIMER QUANTITATIVE: 5045 NG/ML FEU (ref 0–500)
DEVICE: ABNORMAL
EKG ATRIAL RATE: 107 BPM
EKG Q-T INTERVAL: 320 MS
EKG QRS DURATION: 72 MS
EKG QTC CALCULATION (BAZETT): 458 MS
EKG R AXIS: -33 DEGREES
EKG T AXIS: 109 DEGREES
EKG VENTRICULAR RATE: 123 BPM
EOSINOPHIL # BLD: 0.4 %
EOSINOPHILS ABSOLUTE: 0 THOU/MM3 (ref 0–0.4)
GFR SERPL CREATININE-BSD FRML MDRD: 17 ML/MIN/1.73M2
GLUCOSE BLD-MCNC: 125 MG/DL (ref 70–108)
GLUCOSE BLD-MCNC: 130 MG/DL (ref 70–108)
GLUCOSE BLD-MCNC: 138 MG/DL (ref 70–108)
GLUCOSE BLD-MCNC: 221 MG/DL (ref 70–108)
HCO3: 18 MMOL/L (ref 23–28)
HCT VFR BLD CALC: 37.5 % (ref 42–52)
HEMOGLOBIN: 12.8 GM/DL (ref 14–18)
IFIO2: 50
LACTIC ACID: 1.2 MMOL/L (ref 0.5–2.2)
LV EF: 55 %
LVEF MODALITY: NORMAL
LYMPHOCYTES # BLD: 8.4 %
LYMPHOCYTES ABSOLUTE: 0.7 THOU/MM3 (ref 1–4.8)
MAGNESIUM: 1.8 MG/DL (ref 1.6–2.4)
MCH RBC QN AUTO: 31.4 PG (ref 27–31)
MCHC RBC AUTO-ENTMCNC: 34.1 GM/DL (ref 33–37)
MCV RBC AUTO: 92.2 FL (ref 80–94)
MONOCYTES # BLD: 7.5 %
MONOCYTES ABSOLUTE: 0.7 THOU/MM3 (ref 0.4–1.3)
MRSA SCREEN: NORMAL
NUCLEATED RED BLOOD CELLS: 0 /100 WBC
O2 SATURATION: 91 %
PCO2: 32 MMHG (ref 35–45)
PDW BLD-RTO: 13.9 % (ref 11.5–14.5)
PH BLOOD GAS: 7.37 (ref 7.35–7.45)
PLATELET # BLD: 157 THOU/MM3 (ref 130–400)
PMV BLD AUTO: 9.3 FL (ref 7.4–10.4)
PO2: 61 MMHG (ref 71–104)
POTASSIUM SERPL-SCNC: 4 MEQ/L (ref 3.5–5.2)
PRO-BNP: ABNORMAL PG/ML (ref 0–1800)
PROCALCITONIN: 0.23 NG/ML (ref 0.01–0.09)
RBC # BLD: 4.06 MILL/MM3 (ref 4.7–6.1)
SEG NEUTROPHILS: 83.1 %
SEGMENTED NEUTROPHILS ABSOLUTE COUNT: 7.3 THOU/MM3 (ref 1.8–7.7)
SODIUM BLD-SCNC: 143 MEQ/L (ref 135–145)
SOURCE, BLOOD GAS: ABNORMAL
TROPONIN T: 2.48 NG/ML
TROPONIN T: 2.72 NG/ML
VRE CULTURE: NORMAL
WBC # BLD: 8.8 THOU/MM3 (ref 4.8–10.8)

## 2018-04-12 PROCEDURE — 6360000002 HC RX W HCPCS: Performed by: INTERNAL MEDICINE

## 2018-04-12 PROCEDURE — 71045 X-RAY EXAM CHEST 1 VIEW: CPT

## 2018-04-12 PROCEDURE — A9540 TC99M MAA: HCPCS | Performed by: EMERGENCY MEDICINE

## 2018-04-12 PROCEDURE — 87449 NOS EACH ORGANISM AG IA: CPT

## 2018-04-12 PROCEDURE — 6370000000 HC RX 637 (ALT 250 FOR IP): Performed by: INTERNAL MEDICINE

## 2018-04-12 PROCEDURE — 97110 THERAPEUTIC EXERCISES: CPT

## 2018-04-12 PROCEDURE — 80048 BASIC METABOLIC PNL TOTAL CA: CPT

## 2018-04-12 PROCEDURE — 84145 PROCALCITONIN (PCT): CPT

## 2018-04-12 PROCEDURE — 83735 ASSAY OF MAGNESIUM: CPT

## 2018-04-12 PROCEDURE — 36415 COLL VENOUS BLD VENIPUNCTURE: CPT

## 2018-04-12 PROCEDURE — C1751 CATH, INF, PER/CENT/MIDLINE: HCPCS

## 2018-04-12 PROCEDURE — 36556 INSERT NON-TUNNEL CV CATH: CPT | Performed by: RADIOLOGY

## 2018-04-12 PROCEDURE — 85379 FIBRIN DEGRADATION QUANT: CPT

## 2018-04-12 PROCEDURE — 83880 ASSAY OF NATRIURETIC PEPTIDE: CPT

## 2018-04-12 PROCEDURE — 2580000003 HC RX 258: Performed by: INTERNAL MEDICINE

## 2018-04-12 PROCEDURE — 93306 TTE W/DOPPLER COMPLETE: CPT

## 2018-04-12 PROCEDURE — 99232 SBSQ HOSP IP/OBS MODERATE 35: CPT | Performed by: INTERNAL MEDICINE

## 2018-04-12 PROCEDURE — 83605 ASSAY OF LACTIC ACID: CPT

## 2018-04-12 PROCEDURE — A9558 XE133 XENON 10MCI: HCPCS | Performed by: EMERGENCY MEDICINE

## 2018-04-12 PROCEDURE — 76937 US GUIDE VASCULAR ACCESS: CPT | Performed by: RADIOLOGY

## 2018-04-12 PROCEDURE — 87040 BLOOD CULTURE FOR BACTERIA: CPT

## 2018-04-12 PROCEDURE — 6370000000 HC RX 637 (ALT 250 FOR IP): Performed by: NURSE PRACTITIONER

## 2018-04-12 PROCEDURE — 82948 REAGENT STRIP/BLOOD GLUCOSE: CPT

## 2018-04-12 PROCEDURE — 77001 FLUOROGUIDE FOR VEIN DEVICE: CPT | Performed by: RADIOLOGY

## 2018-04-12 PROCEDURE — 78582 LUNG VENTILAT&PERFUS IMAGING: CPT

## 2018-04-12 PROCEDURE — 85025 COMPLETE CBC W/AUTO DIFF WBC: CPT

## 2018-04-12 PROCEDURE — 3430000000 HC RX DIAGNOSTIC RADIOPHARMACEUTICAL: Performed by: EMERGENCY MEDICINE

## 2018-04-12 PROCEDURE — 02HV33Z INSERTION OF INFUSION DEVICE INTO SUPERIOR VENA CAVA, PERCUTANEOUS APPROACH: ICD-10-PCS | Performed by: EMERGENCY MEDICINE

## 2018-04-12 PROCEDURE — 82553 CREATINE MB FRACTION: CPT

## 2018-04-12 PROCEDURE — 2500000003 HC RX 250 WO HCPCS: Performed by: INTERNAL MEDICINE

## 2018-04-12 PROCEDURE — 6360000002 HC RX W HCPCS: Performed by: EMERGENCY MEDICINE

## 2018-04-12 PROCEDURE — 6360000002 HC RX W HCPCS

## 2018-04-12 PROCEDURE — 1200000003 HC TELEMETRY R&B

## 2018-04-12 PROCEDURE — 93005 ELECTROCARDIOGRAM TRACING: CPT | Performed by: INTERNAL MEDICINE

## 2018-04-12 PROCEDURE — 87899 AGENT NOS ASSAY W/OPTIC: CPT

## 2018-04-12 PROCEDURE — 99291 CRITICAL CARE FIRST HOUR: CPT | Performed by: INTERNAL MEDICINE

## 2018-04-12 PROCEDURE — 93010 ELECTROCARDIOGRAM REPORT: CPT | Performed by: NUCLEAR MEDICINE

## 2018-04-12 PROCEDURE — 93970 EXTREMITY STUDY: CPT

## 2018-04-12 PROCEDURE — 2500000003 HC RX 250 WO HCPCS

## 2018-04-12 PROCEDURE — 36600 WITHDRAWAL OF ARTERIAL BLOOD: CPT

## 2018-04-12 PROCEDURE — 82803 BLOOD GASES ANY COMBINATION: CPT

## 2018-04-12 PROCEDURE — 84484 ASSAY OF TROPONIN QUANT: CPT

## 2018-04-12 RX ORDER — FUROSEMIDE 10 MG/ML
20 INJECTION INTRAMUSCULAR; INTRAVENOUS ONCE
Status: COMPLETED | OUTPATIENT
Start: 2018-04-12 | End: 2018-04-12

## 2018-04-12 RX ORDER — HEPARIN SODIUM 1000 [USP'U]/ML
80 INJECTION, SOLUTION INTRAVENOUS; SUBCUTANEOUS ONCE
Status: DISCONTINUED | OUTPATIENT
Start: 2018-04-12 | End: 2018-04-12 | Stop reason: ALTCHOICE

## 2018-04-12 RX ORDER — LEVOFLOXACIN 5 MG/ML
500 INJECTION, SOLUTION INTRAVENOUS
Status: DISCONTINUED | OUTPATIENT
Start: 2018-04-12 | End: 2018-04-14

## 2018-04-12 RX ORDER — LIDOCAINE HYDROCHLORIDE 10 MG/ML
5 INJECTION, SOLUTION EPIDURAL; INFILTRATION; INTRACAUDAL; PERINEURAL ONCE
Status: DISCONTINUED | OUTPATIENT
Start: 2018-04-12 | End: 2018-04-17 | Stop reason: HOSPADM

## 2018-04-12 RX ORDER — LEVOFLOXACIN 5 MG/ML
500 INJECTION, SOLUTION INTRAVENOUS EVERY 24 HOURS
Status: DISCONTINUED | OUTPATIENT
Start: 2018-04-12 | End: 2018-04-12 | Stop reason: DRUGHIGH

## 2018-04-12 RX ORDER — HEPARIN SODIUM 10000 [USP'U]/100ML
18 INJECTION, SOLUTION INTRAVENOUS CONTINUOUS
Status: DISCONTINUED | OUTPATIENT
Start: 2018-04-12 | End: 2018-04-14

## 2018-04-12 RX ORDER — HEPARIN SODIUM 1000 [USP'U]/ML
40 INJECTION, SOLUTION INTRAVENOUS; SUBCUTANEOUS PRN
Status: DISCONTINUED | OUTPATIENT
Start: 2018-04-12 | End: 2018-04-14

## 2018-04-12 RX ORDER — FUROSEMIDE 10 MG/ML
60 INJECTION INTRAMUSCULAR; INTRAVENOUS ONCE
Status: COMPLETED | OUTPATIENT
Start: 2018-04-12 | End: 2018-04-12

## 2018-04-12 RX ORDER — HEPARIN SODIUM 10000 [USP'U]/100ML
18 INJECTION, SOLUTION INTRAVENOUS CONTINUOUS
Status: DISCONTINUED | OUTPATIENT
Start: 2018-04-12 | End: 2018-04-12

## 2018-04-12 RX ORDER — HEPARIN SODIUM 1000 [USP'U]/ML
80 INJECTION, SOLUTION INTRAVENOUS; SUBCUTANEOUS PRN
Status: DISCONTINUED | OUTPATIENT
Start: 2018-04-12 | End: 2018-04-14

## 2018-04-12 RX ORDER — HEPARIN SODIUM 1000 [USP'U]/ML
2500 INJECTION, SOLUTION INTRAVENOUS; SUBCUTANEOUS ONCE
Status: COMPLETED | OUTPATIENT
Start: 2018-04-12 | End: 2018-04-12

## 2018-04-12 RX ADMIN — ACETAMINOPHEN 650 MG: 325 TABLET ORAL at 19:53

## 2018-04-12 RX ADMIN — DILTIAZEM HYDROCHLORIDE 10 MG/HR: 5 INJECTION INTRAVENOUS at 19:49

## 2018-04-12 RX ADMIN — INSULIN LISPRO 2 UNITS: 100 INJECTION, SOLUTION INTRAVENOUS; SUBCUTANEOUS at 20:10

## 2018-04-12 RX ADMIN — GABAPENTIN 100 MG: 100 CAPSULE ORAL at 09:11

## 2018-04-12 RX ADMIN — CLONIDINE HYDROCHLORIDE 0.2 MG: 0.2 TABLET ORAL at 09:10

## 2018-04-12 RX ADMIN — METOPROLOL SUCCINATE 25 MG: 25 TABLET, FILM COATED, EXTENDED RELEASE ORAL at 09:11

## 2018-04-12 RX ADMIN — SODIUM BICARBONATE 1300 MG: 650 TABLET ORAL at 09:11

## 2018-04-12 RX ADMIN — FUROSEMIDE 60 MG: 10 INJECTION, SOLUTION INTRAMUSCULAR; INTRAVENOUS at 11:03

## 2018-04-12 RX ADMIN — HEPARIN SODIUM 2500 UNITS: 1000 INJECTION, SOLUTION INTRAVENOUS; SUBCUTANEOUS at 07:45

## 2018-04-12 RX ADMIN — Medication 10 ML: at 19:54

## 2018-04-12 RX ADMIN — HEPARIN SODIUM 18 UNITS/KG/HR: 10000 INJECTION, SOLUTION INTRAVENOUS at 20:37

## 2018-04-12 RX ADMIN — GABAPENTIN 100 MG: 100 CAPSULE ORAL at 19:53

## 2018-04-12 RX ADMIN — VITAMIN D, TAB 1000IU (100/BT) 2000 UNITS: 25 TAB at 09:11

## 2018-04-12 RX ADMIN — SODIUM BICARBONATE 1300 MG: 650 TABLET ORAL at 17:28

## 2018-04-12 RX ADMIN — VITAMIN E CAP 400 UNIT 400 UNITS: 400 CAP at 09:11

## 2018-04-12 RX ADMIN — Medication 5 MILLICURIE: at 14:20

## 2018-04-12 RX ADMIN — ACETAMINOPHEN 650 MG: 325 TABLET ORAL at 14:53

## 2018-04-12 RX ADMIN — SODIUM BICARBONATE 50 MEQ: 84 INJECTION, SOLUTION INTRAVENOUS at 19:18

## 2018-04-12 RX ADMIN — DILTIAZEM HYDROCHLORIDE 5 MG/HR: 5 INJECTION INTRAVENOUS at 07:55

## 2018-04-12 RX ADMIN — LEVOFLOXACIN 500 MG: 5 INJECTION, SOLUTION INTRAVENOUS at 19:53

## 2018-04-12 RX ADMIN — PIPERACILLIN SODIUM AND TAZOBACTAM SODIUM 3.38 G: 3; .375 INJECTION, POWDER, LYOPHILIZED, FOR SOLUTION INTRAVENOUS at 07:55

## 2018-04-12 RX ADMIN — FUROSEMIDE 20 MG: 10 INJECTION, SOLUTION INTRAMUSCULAR; INTRAVENOUS at 06:50

## 2018-04-12 RX ADMIN — PIPERACILLIN SODIUM AND TAZOBACTAM SODIUM 3.38 G: 3; .375 INJECTION, POWDER, LYOPHILIZED, FOR SOLUTION INTRAVENOUS at 19:18

## 2018-04-12 RX ADMIN — HEPARIN SODIUM 12 UNITS/KG/HR: 10000 INJECTION, SOLUTION INTRAVENOUS at 07:45

## 2018-04-12 RX ADMIN — ACETAMINOPHEN 650 MG: 325 TABLET ORAL at 10:02

## 2018-04-12 RX ADMIN — Medication 8.8 MILLICURIE: at 13:10

## 2018-04-12 RX ADMIN — CLONIDINE HYDROCHLORIDE 0.2 MG: 0.2 TABLET ORAL at 19:53

## 2018-04-12 ASSESSMENT — PAIN SCALES - GENERAL
PAINLEVEL_OUTOF10: 0

## 2018-04-13 ENCOUNTER — APPOINTMENT (OUTPATIENT)
Dept: GENERAL RADIOLOGY | Age: 83
DRG: 682 | End: 2018-04-13
Payer: MEDICARE

## 2018-04-13 PROBLEM — I21.4 NON-ST ELEVATION MI (NSTEMI) (HCC): Status: ACTIVE | Noted: 2018-04-13

## 2018-04-13 PROBLEM — I82.442 ACUTE DEEP VEIN THROMBOSIS (DVT) OF TIBIAL VEIN OF LEFT LOWER EXTREMITY (HCC): Status: ACTIVE | Noted: 2018-04-13

## 2018-04-13 PROBLEM — J18.9 PNEUMONIA OF BOTH LOWER LOBES DUE TO INFECTIOUS ORGANISM: Status: ACTIVE | Noted: 2018-04-13

## 2018-04-13 PROBLEM — A41.9 SEPSIS (HCC): Status: ACTIVE | Noted: 2018-04-13

## 2018-04-13 LAB
ANION GAP SERPL CALCULATED.3IONS-SCNC: 15 MEQ/L (ref 8–16)
APTT: 105.6 SECONDS (ref 22–38)
APTT: 55.4 SECONDS (ref 22–38)
APTT: 82.5 SECONDS (ref 22–38)
BASOPHILS # BLD: 0.6 %
BASOPHILS ABSOLUTE: 0 THOU/MM3 (ref 0–0.1)
BUN BLDV-MCNC: 39 MG/DL (ref 7–22)
CALCIUM SERPL-MCNC: 8.6 MG/DL (ref 8.5–10.5)
CHLORIDE BLD-SCNC: 107 MEQ/L (ref 98–111)
CK MB: 13 NG/ML
CO2: 23 MEQ/L (ref 23–33)
CREAT SERPL-MCNC: 3.4 MG/DL (ref 0.4–1.2)
EOSINOPHIL # BLD: 2 %
EOSINOPHILS ABSOLUTE: 0.2 THOU/MM3 (ref 0–0.4)
GFR SERPL CREATININE-BSD FRML MDRD: 17 ML/MIN/1.73M2
GLUCOSE BLD-MCNC: 115 MG/DL (ref 70–108)
GLUCOSE BLD-MCNC: 117 MG/DL (ref 70–108)
GLUCOSE BLD-MCNC: 124 MG/DL (ref 70–108)
GLUCOSE BLD-MCNC: 129 MG/DL (ref 70–108)
GLUCOSE BLD-MCNC: 197 MG/DL (ref 70–108)
HCT VFR BLD CALC: 32.5 % (ref 42–52)
HEMOGLOBIN: 11.1 GM/DL (ref 14–18)
LYMPHOCYTES # BLD: 10.5 %
LYMPHOCYTES ABSOLUTE: 0.8 THOU/MM3 (ref 1–4.8)
MAGNESIUM: 1.5 MG/DL (ref 1.6–2.4)
MCH RBC QN AUTO: 31.3 PG (ref 27–31)
MCHC RBC AUTO-ENTMCNC: 34.2 GM/DL (ref 33–37)
MCV RBC AUTO: 91.6 FL (ref 80–94)
MONOCYTES # BLD: 8.8 %
MONOCYTES ABSOLUTE: 0.7 THOU/MM3 (ref 0.4–1.3)
NUCLEATED RED BLOOD CELLS: 0 /100 WBC
PDW BLD-RTO: 13.8 % (ref 11.5–14.5)
PLATELET # BLD: 148 THOU/MM3 (ref 130–400)
PMV BLD AUTO: 9.4 FL (ref 7.4–10.4)
POTASSIUM SERPL-SCNC: 3.3 MEQ/L (ref 3.5–5.2)
RBC # BLD: 3.55 MILL/MM3 (ref 4.7–6.1)
SEG NEUTROPHILS: 78.1 %
SEGMENTED NEUTROPHILS ABSOLUTE COUNT: 6.2 THOU/MM3 (ref 1.8–7.7)
SODIUM BLD-SCNC: 145 MEQ/L (ref 135–145)
WBC # BLD: 8 THOU/MM3 (ref 4.8–10.8)

## 2018-04-13 PROCEDURE — 6370000000 HC RX 637 (ALT 250 FOR IP): Performed by: INTERNAL MEDICINE

## 2018-04-13 PROCEDURE — 36415 COLL VENOUS BLD VENIPUNCTURE: CPT

## 2018-04-13 PROCEDURE — 82948 REAGENT STRIP/BLOOD GLUCOSE: CPT

## 2018-04-13 PROCEDURE — 2500000003 HC RX 250 WO HCPCS: Performed by: INTERNAL MEDICINE

## 2018-04-13 PROCEDURE — 99223 1ST HOSP IP/OBS HIGH 75: CPT | Performed by: INTERNAL MEDICINE

## 2018-04-13 PROCEDURE — 6360000002 HC RX W HCPCS: Performed by: NURSE PRACTITIONER

## 2018-04-13 PROCEDURE — 2580000003 HC RX 258: Performed by: INTERNAL MEDICINE

## 2018-04-13 PROCEDURE — 6360000002 HC RX W HCPCS: Performed by: INTERNAL MEDICINE

## 2018-04-13 PROCEDURE — 97530 THERAPEUTIC ACTIVITIES: CPT

## 2018-04-13 PROCEDURE — 83735 ASSAY OF MAGNESIUM: CPT

## 2018-04-13 PROCEDURE — 85025 COMPLETE CBC W/AUTO DIFF WBC: CPT

## 2018-04-13 PROCEDURE — 2700000000 HC OXYGEN THERAPY PER DAY

## 2018-04-13 PROCEDURE — 1200000003 HC TELEMETRY R&B

## 2018-04-13 PROCEDURE — 80048 BASIC METABOLIC PNL TOTAL CA: CPT

## 2018-04-13 PROCEDURE — 6360000002 HC RX W HCPCS: Performed by: EMERGENCY MEDICINE

## 2018-04-13 PROCEDURE — C9113 INJ PANTOPRAZOLE SODIUM, VIA: HCPCS | Performed by: EMERGENCY MEDICINE

## 2018-04-13 PROCEDURE — 97116 GAIT TRAINING THERAPY: CPT

## 2018-04-13 PROCEDURE — 71046 X-RAY EXAM CHEST 2 VIEWS: CPT

## 2018-04-13 PROCEDURE — 6370000000 HC RX 637 (ALT 250 FOR IP): Performed by: NURSE PRACTITIONER

## 2018-04-13 PROCEDURE — 99232 SBSQ HOSP IP/OBS MODERATE 35: CPT | Performed by: NURSE PRACTITIONER

## 2018-04-13 PROCEDURE — 85730 THROMBOPLASTIN TIME PARTIAL: CPT

## 2018-04-13 RX ORDER — MAGNESIUM SULFATE IN WATER 40 MG/ML
2 INJECTION, SOLUTION INTRAVENOUS ONCE
Status: COMPLETED | OUTPATIENT
Start: 2018-04-13 | End: 2018-04-13

## 2018-04-13 RX ORDER — CLOPIDOGREL BISULFATE 75 MG/1
75 TABLET ORAL DAILY
Status: DISCONTINUED | OUTPATIENT
Start: 2018-04-13 | End: 2018-04-17 | Stop reason: HOSPADM

## 2018-04-13 RX ORDER — VERAPAMIL HYDROCHLORIDE 240 MG/1
120 TABLET, FILM COATED, EXTENDED RELEASE ORAL NIGHTLY
Status: DISCONTINUED | OUTPATIENT
Start: 2018-04-13 | End: 2018-04-17 | Stop reason: HOSPADM

## 2018-04-13 RX ORDER — SODIUM BICARBONATE 650 MG/1
650 TABLET ORAL 2 TIMES DAILY WITH MEALS
Status: DISCONTINUED | OUTPATIENT
Start: 2018-04-13 | End: 2018-04-17 | Stop reason: HOSPADM

## 2018-04-13 RX ORDER — POTASSIUM CHLORIDE 750 MG/1
40 TABLET, FILM COATED, EXTENDED RELEASE ORAL ONCE
Status: COMPLETED | OUTPATIENT
Start: 2018-04-13 | End: 2018-04-13

## 2018-04-13 RX ORDER — ATORVASTATIN CALCIUM 10 MG/1
10 TABLET, FILM COATED ORAL NIGHTLY
Status: DISCONTINUED | OUTPATIENT
Start: 2018-04-13 | End: 2018-04-17 | Stop reason: HOSPADM

## 2018-04-13 RX ORDER — POTASSIUM CHLORIDE 20 MEQ/1
40 TABLET, EXTENDED RELEASE ORAL ONCE
Status: COMPLETED | OUTPATIENT
Start: 2018-04-13 | End: 2018-04-13

## 2018-04-13 RX ORDER — PANTOPRAZOLE SODIUM 40 MG/10ML
40 INJECTION, POWDER, LYOPHILIZED, FOR SOLUTION INTRAVENOUS DAILY
Status: DISCONTINUED | OUTPATIENT
Start: 2018-04-13 | End: 2018-04-16

## 2018-04-13 RX ORDER — FUROSEMIDE 10 MG/ML
60 INJECTION INTRAMUSCULAR; INTRAVENOUS ONCE
Status: COMPLETED | OUTPATIENT
Start: 2018-04-13 | End: 2018-04-13

## 2018-04-13 RX ORDER — VERAPAMIL HYDROCHLORIDE 240 MG/1
120 TABLET, FILM COATED, EXTENDED RELEASE ORAL NIGHTLY
Status: DISCONTINUED | OUTPATIENT
Start: 2018-04-13 | End: 2018-04-13

## 2018-04-13 RX ADMIN — FUROSEMIDE 60 MG: 10 INJECTION, SOLUTION INTRAMUSCULAR; INTRAVENOUS at 13:53

## 2018-04-13 RX ADMIN — VERAPAMIL HYDROCHLORIDE 120 MG: 240 TABLET, FILM COATED, EXTENDED RELEASE ORAL at 18:42

## 2018-04-13 RX ADMIN — Medication 2 UNITS: at 13:53

## 2018-04-13 RX ADMIN — PIPERACILLIN SODIUM AND TAZOBACTAM SODIUM 3.38 G: 3; .375 INJECTION, POWDER, LYOPHILIZED, FOR SOLUTION INTRAVENOUS at 08:05

## 2018-04-13 RX ADMIN — MAGNESIUM SULFATE HEPTAHYDRATE 2 G: 40 INJECTION, SOLUTION INTRAVENOUS at 08:18

## 2018-04-13 RX ADMIN — ACETAMINOPHEN 650 MG: 325 TABLET ORAL at 03:29

## 2018-04-13 RX ADMIN — POTASSIUM CHLORIDE 40 MEQ: 750 TABLET, FILM COATED, EXTENDED RELEASE ORAL at 13:53

## 2018-04-13 RX ADMIN — DILTIAZEM HYDROCHLORIDE 10 MG/HR: 5 INJECTION INTRAVENOUS at 11:55

## 2018-04-13 RX ADMIN — HEPARIN SODIUM 20 UNITS/KG/HR: 10000 INJECTION, SOLUTION INTRAVENOUS at 10:20

## 2018-04-13 RX ADMIN — PANTOPRAZOLE SODIUM 40 MG: 40 INJECTION, POWDER, FOR SOLUTION INTRAVENOUS at 11:55

## 2018-04-13 RX ADMIN — METOPROLOL SUCCINATE 25 MG: 25 TABLET, FILM COATED, EXTENDED RELEASE ORAL at 08:05

## 2018-04-13 RX ADMIN — CLOPIDOGREL BISULFATE 75 MG: 75 TABLET ORAL at 18:42

## 2018-04-13 RX ADMIN — SODIUM BICARBONATE 1300 MG: 650 TABLET ORAL at 08:05

## 2018-04-13 RX ADMIN — PIPERACILLIN SODIUM AND TAZOBACTAM SODIUM 3.38 G: 3; .375 INJECTION, POWDER, LYOPHILIZED, FOR SOLUTION INTRAVENOUS at 18:38

## 2018-04-13 RX ADMIN — POTASSIUM CHLORIDE 40 MEQ: 750 TABLET, FILM COATED, EXTENDED RELEASE ORAL at 08:18

## 2018-04-13 RX ADMIN — VITAMIN E CAP 400 UNIT 400 UNITS: 400 CAP at 08:05

## 2018-04-13 RX ADMIN — Medication 10 ML: at 20:26

## 2018-04-13 RX ADMIN — POTASSIUM CHLORIDE 40 MEQ: 1500 TABLET, EXTENDED RELEASE ORAL at 18:42

## 2018-04-13 RX ADMIN — CLONIDINE HYDROCHLORIDE 0.2 MG: 0.2 TABLET ORAL at 20:26

## 2018-04-13 RX ADMIN — GABAPENTIN 100 MG: 100 CAPSULE ORAL at 08:05

## 2018-04-13 RX ADMIN — CLONIDINE HYDROCHLORIDE 0.2 MG: 0.2 TABLET ORAL at 08:05

## 2018-04-13 RX ADMIN — ATORVASTATIN CALCIUM 10 MG: 10 TABLET, FILM COATED ORAL at 20:25

## 2018-04-13 RX ADMIN — VITAMIN D, TAB 1000IU (100/BT) 2000 UNITS: 25 TAB at 08:05

## 2018-04-13 RX ADMIN — GABAPENTIN 100 MG: 100 CAPSULE ORAL at 20:26

## 2018-04-13 RX ADMIN — SODIUM BICARBONATE 650 MG: 650 TABLET ORAL at 18:43

## 2018-04-13 ASSESSMENT — PAIN SCALES - GENERAL
PAINLEVEL_OUTOF10: 0

## 2018-04-14 LAB
ANION GAP SERPL CALCULATED.3IONS-SCNC: 13 MEQ/L (ref 8–16)
APTT: 87.9 SECONDS (ref 22–38)
APTT: 91 SECONDS (ref 22–38)
BUN BLDV-MCNC: 59 MG/DL (ref 7–22)
CALCIUM IONIZED: 1.12 MMOL/L (ref 1.12–1.32)
CALCIUM SERPL-MCNC: 8.4 MG/DL (ref 8.5–10.5)
CHLORIDE BLD-SCNC: 106 MEQ/L (ref 98–111)
CO2: 22 MEQ/L (ref 23–33)
CREAT SERPL-MCNC: 4.1 MG/DL (ref 0.4–1.2)
EOSINOPHIL SMEAR: NORMAL
GFR SERPL CREATININE-BSD FRML MDRD: 14 ML/MIN/1.73M2
GLUCOSE BLD-MCNC: 122 MG/DL (ref 70–108)
GLUCOSE BLD-MCNC: 129 MG/DL (ref 70–108)
GLUCOSE BLD-MCNC: 132 MG/DL (ref 70–108)
GLUCOSE BLD-MCNC: 136 MG/DL (ref 70–108)
GLUCOSE BLD-MCNC: 148 MG/DL (ref 70–108)
MAGNESIUM: 1.8 MG/DL (ref 1.6–2.4)
POTASSIUM SERPL-SCNC: 4.2 MEQ/L (ref 3.5–5.2)
SODIUM BLD-SCNC: 141 MEQ/L (ref 135–145)
SPECIMEN: NORMAL

## 2018-04-14 PROCEDURE — 83735 ASSAY OF MAGNESIUM: CPT

## 2018-04-14 PROCEDURE — 6370000000 HC RX 637 (ALT 250 FOR IP): Performed by: NURSE PRACTITIONER

## 2018-04-14 PROCEDURE — 2580000003 HC RX 258: Performed by: INTERNAL MEDICINE

## 2018-04-14 PROCEDURE — 1200000003 HC TELEMETRY R&B

## 2018-04-14 PROCEDURE — 80048 BASIC METABOLIC PNL TOTAL CA: CPT

## 2018-04-14 PROCEDURE — 36415 COLL VENOUS BLD VENIPUNCTURE: CPT

## 2018-04-14 PROCEDURE — 82330 ASSAY OF CALCIUM: CPT

## 2018-04-14 PROCEDURE — 6360000002 HC RX W HCPCS: Performed by: INTERNAL MEDICINE

## 2018-04-14 PROCEDURE — C9113 INJ PANTOPRAZOLE SODIUM, VIA: HCPCS | Performed by: EMERGENCY MEDICINE

## 2018-04-14 PROCEDURE — 89190 NASAL SMEAR FOR EOSINOPHILS: CPT

## 2018-04-14 PROCEDURE — 82948 REAGENT STRIP/BLOOD GLUCOSE: CPT

## 2018-04-14 PROCEDURE — 6360000002 HC RX W HCPCS: Performed by: EMERGENCY MEDICINE

## 2018-04-14 PROCEDURE — 6370000000 HC RX 637 (ALT 250 FOR IP): Performed by: INTERNAL MEDICINE

## 2018-04-14 PROCEDURE — 99232 SBSQ HOSP IP/OBS MODERATE 35: CPT | Performed by: NURSE PRACTITIONER

## 2018-04-14 PROCEDURE — 99232 SBSQ HOSP IP/OBS MODERATE 35: CPT | Performed by: INTERNAL MEDICINE

## 2018-04-14 PROCEDURE — 85730 THROMBOPLASTIN TIME PARTIAL: CPT

## 2018-04-14 RX ORDER — METOPROLOL SUCCINATE 50 MG/1
50 TABLET, EXTENDED RELEASE ORAL DAILY
Status: DISCONTINUED | OUTPATIENT
Start: 2018-04-15 | End: 2018-04-17 | Stop reason: HOSPADM

## 2018-04-14 RX ORDER — METOPROLOL SUCCINATE 25 MG/1
25 TABLET, EXTENDED RELEASE ORAL ONCE
Status: COMPLETED | OUTPATIENT
Start: 2018-04-14 | End: 2018-04-14

## 2018-04-14 RX ORDER — HEPARIN SODIUM 5000 [USP'U]/ML
5000 INJECTION, SOLUTION INTRAVENOUS; SUBCUTANEOUS EVERY 8 HOURS SCHEDULED
Status: DISCONTINUED | OUTPATIENT
Start: 2018-04-14 | End: 2018-04-15

## 2018-04-14 RX ADMIN — SODIUM BICARBONATE 650 MG: 650 TABLET ORAL at 08:06

## 2018-04-14 RX ADMIN — CLONIDINE HYDROCHLORIDE 0.2 MG: 0.2 TABLET ORAL at 20:10

## 2018-04-14 RX ADMIN — PIPERACILLIN SODIUM AND TAZOBACTAM SODIUM 3.38 G: 3; .375 INJECTION, POWDER, LYOPHILIZED, FOR SOLUTION INTRAVENOUS at 07:39

## 2018-04-14 RX ADMIN — Medication 2 UNITS: at 18:16

## 2018-04-14 RX ADMIN — Medication 10 ML: at 08:07

## 2018-04-14 RX ADMIN — ATORVASTATIN CALCIUM 10 MG: 10 TABLET, FILM COATED ORAL at 20:10

## 2018-04-14 RX ADMIN — CLOPIDOGREL BISULFATE 75 MG: 75 TABLET ORAL at 10:44

## 2018-04-14 RX ADMIN — VITAMIN D, TAB 1000IU (100/BT) 2000 UNITS: 25 TAB at 08:07

## 2018-04-14 RX ADMIN — SODIUM BICARBONATE 650 MG: 650 TABLET ORAL at 18:16

## 2018-04-14 RX ADMIN — VERAPAMIL HYDROCHLORIDE 120 MG: 240 TABLET, FILM COATED, EXTENDED RELEASE ORAL at 20:10

## 2018-04-14 RX ADMIN — METOPROLOL SUCCINATE 25 MG: 25 TABLET, FILM COATED, EXTENDED RELEASE ORAL at 16:01

## 2018-04-14 RX ADMIN — PIPERACILLIN SODIUM AND TAZOBACTAM SODIUM 3.38 G: 3; .375 INJECTION, POWDER, LYOPHILIZED, FOR SOLUTION INTRAVENOUS at 20:10

## 2018-04-14 RX ADMIN — METOPROLOL SUCCINATE 25 MG: 25 TABLET, FILM COATED, EXTENDED RELEASE ORAL at 08:07

## 2018-04-14 RX ADMIN — VITAMIN E CAP 400 UNIT 400 UNITS: 400 CAP at 08:06

## 2018-04-14 RX ADMIN — GABAPENTIN 100 MG: 100 CAPSULE ORAL at 20:10

## 2018-04-14 RX ADMIN — Medication 10 ML: at 20:10

## 2018-04-14 RX ADMIN — GABAPENTIN 100 MG: 100 CAPSULE ORAL at 08:07

## 2018-04-14 RX ADMIN — HEPARIN SODIUM 18 UNITS/KG/HR: 10000 INJECTION, SOLUTION INTRAVENOUS at 07:49

## 2018-04-14 RX ADMIN — PANTOPRAZOLE SODIUM 40 MG: 40 INJECTION, POWDER, FOR SOLUTION INTRAVENOUS at 07:40

## 2018-04-14 RX ADMIN — CLONIDINE HYDROCHLORIDE 0.2 MG: 0.2 TABLET ORAL at 08:07

## 2018-04-15 LAB
BACTERIA: ABNORMAL /HPF
BILIRUBIN URINE: NEGATIVE
BLOOD, URINE: NEGATIVE
CASTS 2: ABNORMAL /LPF
CASTS UA: ABNORMAL /LPF
CHARACTER, URINE: CLEAR
COLOR: YELLOW
CRYSTALS, UA: ABNORMAL
EPITHELIAL CELLS, UA: ABNORMAL /HPF
GLUCOSE BLD-MCNC: 103 MG/DL (ref 70–108)
GLUCOSE BLD-MCNC: 109 MG/DL (ref 70–108)
GLUCOSE BLD-MCNC: 137 MG/DL (ref 70–108)
GLUCOSE BLD-MCNC: 184 MG/DL (ref 70–108)
GLUCOSE BLD-MCNC: 227 MG/DL (ref 70–108)
GLUCOSE URINE: NEGATIVE MG/DL
KETONES, URINE: NEGATIVE
LEGIONELLA URINARY AG: NEGATIVE
LEUKOCYTE ESTERASE, URINE: NEGATIVE
MISCELLANEOUS 2: ABNORMAL
NITRITE, URINE: NEGATIVE
PH UA: 6
PROTEIN UA: 30
RBC URINE: ABNORMAL /HPF
RENAL EPITHELIAL, UA: ABNORMAL
SPECIFIC GRAVITY, URINE: 1.01 (ref 1–1.03)
STREP PNEUMO AG, UR: NEGATIVE
STREP PNEUMO AG, UR: NEGATIVE
UROBILINOGEN, URINE: 1 EU/DL
WBC UA: ABNORMAL /HPF
YEAST: ABNORMAL

## 2018-04-15 PROCEDURE — 99231 SBSQ HOSP IP/OBS SF/LOW 25: CPT | Performed by: NURSE PRACTITIONER

## 2018-04-15 PROCEDURE — 6370000000 HC RX 637 (ALT 250 FOR IP): Performed by: INTERNAL MEDICINE

## 2018-04-15 PROCEDURE — 6360000002 HC RX W HCPCS: Performed by: INTERNAL MEDICINE

## 2018-04-15 PROCEDURE — 6360000002 HC RX W HCPCS: Performed by: EMERGENCY MEDICINE

## 2018-04-15 PROCEDURE — 82948 REAGENT STRIP/BLOOD GLUCOSE: CPT

## 2018-04-15 PROCEDURE — 6370000000 HC RX 637 (ALT 250 FOR IP): Performed by: NURSE PRACTITIONER

## 2018-04-15 PROCEDURE — 1200000003 HC TELEMETRY R&B

## 2018-04-15 PROCEDURE — C9113 INJ PANTOPRAZOLE SODIUM, VIA: HCPCS | Performed by: EMERGENCY MEDICINE

## 2018-04-15 PROCEDURE — 2580000003 HC RX 258: Performed by: INTERNAL MEDICINE

## 2018-04-15 PROCEDURE — 81001 URINALYSIS AUTO W/SCOPE: CPT

## 2018-04-15 PROCEDURE — 99232 SBSQ HOSP IP/OBS MODERATE 35: CPT | Performed by: INTERNAL MEDICINE

## 2018-04-15 RX ADMIN — GABAPENTIN 100 MG: 100 CAPSULE ORAL at 08:43

## 2018-04-15 RX ADMIN — APIXABAN 2.5 MG: 2.5 TABLET, FILM COATED ORAL at 13:24

## 2018-04-15 RX ADMIN — INSULIN LISPRO 1 UNITS: 100 INJECTION, SOLUTION INTRAVENOUS; SUBCUTANEOUS at 20:40

## 2018-04-15 RX ADMIN — METOPROLOL SUCCINATE 50 MG: 50 TABLET, FILM COATED, EXTENDED RELEASE ORAL at 08:43

## 2018-04-15 RX ADMIN — PANTOPRAZOLE SODIUM 40 MG: 40 INJECTION, POWDER, FOR SOLUTION INTRAVENOUS at 07:38

## 2018-04-15 RX ADMIN — SODIUM BICARBONATE 650 MG: 650 TABLET ORAL at 17:13

## 2018-04-15 RX ADMIN — VITAMIN E CAP 400 UNIT 400 UNITS: 400 CAP at 08:43

## 2018-04-15 RX ADMIN — CLOPIDOGREL BISULFATE 75 MG: 75 TABLET ORAL at 08:46

## 2018-04-15 RX ADMIN — Medication 10 ML: at 08:46

## 2018-04-15 RX ADMIN — GABAPENTIN 100 MG: 100 CAPSULE ORAL at 20:40

## 2018-04-15 RX ADMIN — APIXABAN 2.5 MG: 2.5 TABLET, FILM COATED ORAL at 20:40

## 2018-04-15 RX ADMIN — VERAPAMIL HYDROCHLORIDE 120 MG: 240 TABLET, FILM COATED, EXTENDED RELEASE ORAL at 20:40

## 2018-04-15 RX ADMIN — CLONIDINE HYDROCHLORIDE 0.2 MG: 0.2 TABLET ORAL at 08:43

## 2018-04-15 RX ADMIN — HEPARIN SODIUM 5000 UNITS: 5000 INJECTION, SOLUTION INTRAVENOUS; SUBCUTANEOUS at 06:36

## 2018-04-15 RX ADMIN — VITAMIN D, TAB 1000IU (100/BT) 2000 UNITS: 25 TAB at 08:43

## 2018-04-15 RX ADMIN — CLONIDINE HYDROCHLORIDE 0.2 MG: 0.2 TABLET ORAL at 20:40

## 2018-04-15 RX ADMIN — ATORVASTATIN CALCIUM 10 MG: 10 TABLET, FILM COATED ORAL at 20:40

## 2018-04-15 RX ADMIN — SODIUM BICARBONATE 650 MG: 650 TABLET ORAL at 08:43

## 2018-04-15 RX ADMIN — Medication 10 ML: at 20:47

## 2018-04-15 RX ADMIN — Medication 4 UNITS: at 07:43

## 2018-04-16 ENCOUNTER — APPOINTMENT (OUTPATIENT)
Dept: GENERAL RADIOLOGY | Age: 83
DRG: 682 | End: 2018-04-16
Payer: MEDICARE

## 2018-04-16 LAB
ALBUMIN SERPL-MCNC: 3.2 G/DL (ref 3.5–5.1)
ALP BLD-CCNC: 287 U/L (ref 38–126)
ALT SERPL-CCNC: 79 U/L (ref 11–66)
ANION GAP SERPL CALCULATED.3IONS-SCNC: 14 MEQ/L (ref 8–16)
AST SERPL-CCNC: 77 U/L (ref 5–40)
BILIRUB SERPL-MCNC: 0.6 MG/DL (ref 0.3–1.2)
BUN BLDV-MCNC: 70 MG/DL (ref 7–22)
CALCIUM SERPL-MCNC: 9 MG/DL (ref 8.5–10.5)
CHLORIDE BLD-SCNC: 107 MEQ/L (ref 98–111)
CO2: 22 MEQ/L (ref 23–33)
CREAT SERPL-MCNC: 3.8 MG/DL (ref 0.4–1.2)
GFR SERPL CREATININE-BSD FRML MDRD: 15 ML/MIN/1.73M2
GLUCOSE BLD-MCNC: 107 MG/DL (ref 70–108)
GLUCOSE BLD-MCNC: 133 MG/DL (ref 70–108)
GLUCOSE BLD-MCNC: 164 MG/DL (ref 70–108)
GLUCOSE BLD-MCNC: 176 MG/DL (ref 70–108)
GLUCOSE BLD-MCNC: 98 MG/DL (ref 70–108)
HCT VFR BLD CALC: 24.6 % (ref 42–52)
HEMOGLOBIN: 8.6 GM/DL (ref 14–18)
MCH RBC QN AUTO: 32.3 PG (ref 27–31)
MCHC RBC AUTO-ENTMCNC: 34.9 GM/DL (ref 33–37)
MCV RBC AUTO: 92.7 FL (ref 80–94)
PDW BLD-RTO: 13.9 % (ref 11.5–14.5)
PLATELET # BLD: 207 THOU/MM3 (ref 130–400)
PMV BLD AUTO: 8.6 FL (ref 7.4–10.4)
POTASSIUM SERPL-SCNC: 4.3 MEQ/L (ref 3.5–5.2)
RBC # BLD: 2.65 MILL/MM3 (ref 4.7–6.1)
SODIUM BLD-SCNC: 143 MEQ/L (ref 135–145)
TOTAL PROTEIN: 5.8 G/DL (ref 6.1–8)
WBC # BLD: 6 THOU/MM3 (ref 4.8–10.8)

## 2018-04-16 PROCEDURE — 6360000002 HC RX W HCPCS: Performed by: EMERGENCY MEDICINE

## 2018-04-16 PROCEDURE — 6370000000 HC RX 637 (ALT 250 FOR IP): Performed by: INTERNAL MEDICINE

## 2018-04-16 PROCEDURE — 2580000003 HC RX 258: Performed by: INTERNAL MEDICINE

## 2018-04-16 PROCEDURE — 80053 COMPREHEN METABOLIC PANEL: CPT

## 2018-04-16 PROCEDURE — 85027 COMPLETE CBC AUTOMATED: CPT

## 2018-04-16 PROCEDURE — 99222 1ST HOSP IP/OBS MODERATE 55: CPT | Performed by: PHYSICIAN ASSISTANT

## 2018-04-16 PROCEDURE — 1200000003 HC TELEMETRY R&B

## 2018-04-16 PROCEDURE — 99232 SBSQ HOSP IP/OBS MODERATE 35: CPT | Performed by: NURSE PRACTITIONER

## 2018-04-16 PROCEDURE — 82948 REAGENT STRIP/BLOOD GLUCOSE: CPT

## 2018-04-16 PROCEDURE — 97116 GAIT TRAINING THERAPY: CPT

## 2018-04-16 PROCEDURE — 6370000000 HC RX 637 (ALT 250 FOR IP): Performed by: NURSE PRACTITIONER

## 2018-04-16 PROCEDURE — C9113 INJ PANTOPRAZOLE SODIUM, VIA: HCPCS | Performed by: EMERGENCY MEDICINE

## 2018-04-16 PROCEDURE — 36415 COLL VENOUS BLD VENIPUNCTURE: CPT

## 2018-04-16 PROCEDURE — 71046 X-RAY EXAM CHEST 2 VIEWS: CPT

## 2018-04-16 PROCEDURE — 97110 THERAPEUTIC EXERCISES: CPT

## 2018-04-16 RX ORDER — PANTOPRAZOLE SODIUM 40 MG/1
40 TABLET, DELAYED RELEASE ORAL
Status: DISCONTINUED | OUTPATIENT
Start: 2018-04-17 | End: 2018-04-17 | Stop reason: HOSPADM

## 2018-04-16 RX ADMIN — INSULIN LISPRO 1 UNITS: 100 INJECTION, SOLUTION INTRAVENOUS; SUBCUTANEOUS at 21:04

## 2018-04-16 RX ADMIN — Medication 10 ML: at 21:09

## 2018-04-16 RX ADMIN — SODIUM BICARBONATE 650 MG: 650 TABLET ORAL at 17:18

## 2018-04-16 RX ADMIN — GABAPENTIN 100 MG: 100 CAPSULE ORAL at 21:04

## 2018-04-16 RX ADMIN — APIXABAN 2.5 MG: 2.5 TABLET, FILM COATED ORAL at 08:46

## 2018-04-16 RX ADMIN — ATORVASTATIN CALCIUM 10 MG: 10 TABLET, FILM COATED ORAL at 21:04

## 2018-04-16 RX ADMIN — Medication 10 ML: at 08:47

## 2018-04-16 RX ADMIN — Medication 2 UNITS: at 13:14

## 2018-04-16 RX ADMIN — VITAMIN E CAP 400 UNIT 400 UNITS: 400 CAP at 08:46

## 2018-04-16 RX ADMIN — CLONIDINE HYDROCHLORIDE 0.2 MG: 0.2 TABLET ORAL at 08:46

## 2018-04-16 RX ADMIN — CLONIDINE HYDROCHLORIDE 0.2 MG: 0.2 TABLET ORAL at 21:04

## 2018-04-16 RX ADMIN — PANTOPRAZOLE SODIUM 40 MG: 40 INJECTION, POWDER, FOR SOLUTION INTRAVENOUS at 06:39

## 2018-04-16 RX ADMIN — CLOPIDOGREL BISULFATE 75 MG: 75 TABLET ORAL at 08:55

## 2018-04-16 RX ADMIN — GABAPENTIN 100 MG: 100 CAPSULE ORAL at 08:46

## 2018-04-16 RX ADMIN — VERAPAMIL HYDROCHLORIDE 120 MG: 240 TABLET, FILM COATED, EXTENDED RELEASE ORAL at 21:04

## 2018-04-16 RX ADMIN — METOPROLOL SUCCINATE 50 MG: 50 TABLET, FILM COATED, EXTENDED RELEASE ORAL at 10:17

## 2018-04-16 RX ADMIN — SODIUM BICARBONATE 650 MG: 650 TABLET ORAL at 08:46

## 2018-04-16 RX ADMIN — VITAMIN D, TAB 1000IU (100/BT) 2000 UNITS: 25 TAB at 08:46

## 2018-04-16 RX ADMIN — APIXABAN 2.5 MG: 2.5 TABLET, FILM COATED ORAL at 21:03

## 2018-04-16 ASSESSMENT — PAIN SCALES - GENERAL: PAINLEVEL_OUTOF10: 0

## 2018-04-16 ASSESSMENT — ENCOUNTER SYMPTOMS: SHORTNESS OF BREATH: 0

## 2018-04-17 VITALS
BODY MASS INDEX: 23.68 KG/M2 | SYSTOLIC BLOOD PRESSURE: 123 MMHG | TEMPERATURE: 97.8 F | DIASTOLIC BLOOD PRESSURE: 67 MMHG | WEIGHT: 159.9 LBS | HEART RATE: 85 BPM | OXYGEN SATURATION: 97 % | RESPIRATION RATE: 17 BRPM | HEIGHT: 69 IN

## 2018-04-17 LAB
ANION GAP SERPL CALCULATED.3IONS-SCNC: 13 MEQ/L (ref 8–16)
BASOPHILS # BLD: 0.9 %
BASOPHILS ABSOLUTE: 0.1 THOU/MM3 (ref 0–0.1)
BLOOD CULTURE, ROUTINE: NORMAL
BLOOD CULTURE, ROUTINE: NORMAL
BUN BLDV-MCNC: 63 MG/DL (ref 7–22)
CALCIUM SERPL-MCNC: 8.7 MG/DL (ref 8.5–10.5)
CHLORIDE BLD-SCNC: 105 MEQ/L (ref 98–111)
CO2: 23 MEQ/L (ref 23–33)
CREAT SERPL-MCNC: 3.5 MG/DL (ref 0.4–1.2)
EOSINOPHIL # BLD: 5.6 %
EOSINOPHILS ABSOLUTE: 0.4 THOU/MM3 (ref 0–0.4)
GFR SERPL CREATININE-BSD FRML MDRD: 17 ML/MIN/1.73M2
GLUCOSE BLD-MCNC: 119 MG/DL (ref 70–108)
GLUCOSE BLD-MCNC: 191 MG/DL (ref 70–108)
HCT VFR BLD CALC: 24.1 % (ref 42–52)
HEMOGLOBIN: 8.5 GM/DL (ref 14–18)
LYMPHOCYTES # BLD: 23.1 %
LYMPHOCYTES ABSOLUTE: 1.6 THOU/MM3 (ref 1–4.8)
MCH RBC QN AUTO: 32.4 PG (ref 27–31)
MCHC RBC AUTO-ENTMCNC: 35.1 GM/DL (ref 33–37)
MCV RBC AUTO: 92.2 FL (ref 80–94)
MONOCYTES # BLD: 7.2 %
MONOCYTES ABSOLUTE: 0.5 THOU/MM3 (ref 0.4–1.3)
NUCLEATED RED BLOOD CELLS: 0 /100 WBC
PDW BLD-RTO: 14.1 % (ref 11.5–14.5)
PLATELET # BLD: 238 THOU/MM3 (ref 130–400)
PMV BLD AUTO: 7.9 FL (ref 7.4–10.4)
POTASSIUM SERPL-SCNC: 4.2 MEQ/L (ref 3.5–5.2)
RBC # BLD: 2.61 MILL/MM3 (ref 4.7–6.1)
SEG NEUTROPHILS: 63.2 %
SEGMENTED NEUTROPHILS ABSOLUTE COUNT: 4.3 THOU/MM3 (ref 1.8–7.7)
SODIUM BLD-SCNC: 141 MEQ/L (ref 135–145)
WBC # BLD: 6.8 THOU/MM3 (ref 4.8–10.8)

## 2018-04-17 PROCEDURE — 6370000000 HC RX 637 (ALT 250 FOR IP): Performed by: NURSE PRACTITIONER

## 2018-04-17 PROCEDURE — 6360000002 HC RX W HCPCS

## 2018-04-17 PROCEDURE — 82948 REAGENT STRIP/BLOOD GLUCOSE: CPT

## 2018-04-17 PROCEDURE — 99231 SBSQ HOSP IP/OBS SF/LOW 25: CPT | Performed by: INTERNAL MEDICINE

## 2018-04-17 PROCEDURE — 85025 COMPLETE CBC W/AUTO DIFF WBC: CPT

## 2018-04-17 PROCEDURE — 2500000003 HC RX 250 WO HCPCS

## 2018-04-17 PROCEDURE — 2580000003 HC RX 258

## 2018-04-17 PROCEDURE — 6370000000 HC RX 637 (ALT 250 FOR IP): Performed by: INTERNAL MEDICINE

## 2018-04-17 PROCEDURE — 6370000000 HC RX 637 (ALT 250 FOR IP): Performed by: EMERGENCY MEDICINE

## 2018-04-17 PROCEDURE — 2580000003 HC RX 258: Performed by: INTERNAL MEDICINE

## 2018-04-17 PROCEDURE — 36415 COLL VENOUS BLD VENIPUNCTURE: CPT

## 2018-04-17 PROCEDURE — 80048 BASIC METABOLIC PNL TOTAL CA: CPT

## 2018-04-17 RX ORDER — CLOPIDOGREL BISULFATE 75 MG/1
75 TABLET ORAL DAILY
Qty: 30 TABLET | Refills: 3 | Status: SHIPPED | OUTPATIENT
Start: 2018-04-18 | End: 2018-08-29 | Stop reason: SDUPTHER

## 2018-04-17 RX ORDER — CLONIDINE HYDROCHLORIDE 0.2 MG/1
0.2 TABLET ORAL 3 TIMES DAILY
Qty: 90 TABLET | Refills: 2 | Status: ON HOLD | OUTPATIENT
Start: 2018-04-17 | End: 2018-05-02 | Stop reason: HOSPADM

## 2018-04-17 RX ORDER — METOPROLOL SUCCINATE 50 MG/1
50 TABLET, EXTENDED RELEASE ORAL DAILY
Qty: 30 TABLET | Refills: 3 | Status: SHIPPED | OUTPATIENT
Start: 2018-04-18 | End: 2018-05-17 | Stop reason: DRUGHIGH

## 2018-04-17 RX ORDER — PANTOPRAZOLE SODIUM 40 MG/1
40 TABLET, DELAYED RELEASE ORAL
Qty: 30 TABLET | Refills: 3 | Status: SHIPPED | OUTPATIENT
Start: 2018-04-18 | End: 2018-08-29 | Stop reason: SDUPTHER

## 2018-04-17 RX ADMIN — APIXABAN 2.5 MG: 2.5 TABLET, FILM COATED ORAL at 09:13

## 2018-04-17 RX ADMIN — SODIUM BICARBONATE 650 MG: 650 TABLET ORAL at 09:12

## 2018-04-17 RX ADMIN — GABAPENTIN 100 MG: 100 CAPSULE ORAL at 09:13

## 2018-04-17 RX ADMIN — VITAMIN E CAP 400 UNIT 400 UNITS: 400 CAP at 09:12

## 2018-04-17 RX ADMIN — Medication 2 UNITS: at 13:04

## 2018-04-17 RX ADMIN — PANTOPRAZOLE SODIUM 40 MG: 40 TABLET, DELAYED RELEASE ORAL at 06:30

## 2018-04-17 RX ADMIN — Medication 10 ML: at 09:13

## 2018-04-17 RX ADMIN — CLOPIDOGREL BISULFATE 75 MG: 75 TABLET ORAL at 09:14

## 2018-04-17 RX ADMIN — CLONIDINE HYDROCHLORIDE 0.2 MG: 0.2 TABLET ORAL at 09:13

## 2018-04-17 RX ADMIN — METOPROLOL SUCCINATE 50 MG: 50 TABLET, FILM COATED, EXTENDED RELEASE ORAL at 09:12

## 2018-04-17 RX ADMIN — VITAMIN D, TAB 1000IU (100/BT) 2000 UNITS: 25 TAB at 10:24

## 2018-04-17 ASSESSMENT — PAIN SCALES - GENERAL: PAINLEVEL_OUTOF10: 0

## 2018-04-18 ENCOUNTER — CARE COORDINATION (OUTPATIENT)
Dept: CASE MANAGEMENT | Age: 83
End: 2018-04-18

## 2018-04-19 ENCOUNTER — OFFICE VISIT (OUTPATIENT)
Dept: FAMILY MEDICINE CLINIC | Age: 83
End: 2018-04-19

## 2018-04-19 VITALS
DIASTOLIC BLOOD PRESSURE: 64 MMHG | RESPIRATION RATE: 16 BRPM | HEART RATE: 68 BPM | WEIGHT: 170.2 LBS | BODY MASS INDEX: 26.71 KG/M2 | SYSTOLIC BLOOD PRESSURE: 120 MMHG | HEIGHT: 67 IN

## 2018-04-19 DIAGNOSIS — N17.9 ACUTE RENAL FAILURE SUPERIMPOSED ON STAGE 4 CHRONIC KIDNEY DISEASE, UNSPECIFIED ACUTE RENAL FAILURE TYPE (HCC): Primary | ICD-10-CM

## 2018-04-19 DIAGNOSIS — D64.9 ANEMIA, UNSPECIFIED TYPE: ICD-10-CM

## 2018-04-19 DIAGNOSIS — N18.4 ACUTE RENAL FAILURE SUPERIMPOSED ON STAGE 4 CHRONIC KIDNEY DISEASE, UNSPECIFIED ACUTE RENAL FAILURE TYPE (HCC): Primary | ICD-10-CM

## 2018-04-19 DIAGNOSIS — I82.442 ACUTE DEEP VEIN THROMBOSIS (DVT) OF TIBIAL VEIN OF LEFT LOWER EXTREMITY (HCC): ICD-10-CM

## 2018-04-19 DIAGNOSIS — K86.89 PANCREATIC MASS: ICD-10-CM

## 2018-04-19 DIAGNOSIS — I10 ESSENTIAL HYPERTENSION: ICD-10-CM

## 2018-04-19 PROCEDURE — 99214 OFFICE O/P EST MOD 30 MIN: CPT | Performed by: EMERGENCY MEDICINE

## 2018-04-19 ASSESSMENT — ENCOUNTER SYMPTOMS
TROUBLE SWALLOWING: 0
ABDOMINAL PAIN: 0
NAUSEA: 0
SORE THROAT: 0
CHEST TIGHTNESS: 0
SHORTNESS OF BREATH: 0
COUGH: 0
VOMITING: 0
WHEEZING: 0
DIARRHEA: 0
VOICE CHANGE: 0
CONSTIPATION: 0
BACK PAIN: 0
SINUS PRESSURE: 0
RHINORRHEA: 0

## 2018-04-20 ENCOUNTER — HOSPITAL ENCOUNTER (OUTPATIENT)
Age: 83
Setting detail: SPECIMEN
Discharge: HOME | DRG: 208 | End: 2018-04-20
Payer: MEDICARE

## 2018-04-20 ENCOUNTER — TELEPHONE (OUTPATIENT)
Dept: FAMILY MEDICINE CLINIC | Age: 83
End: 2018-04-20

## 2018-04-20 ENCOUNTER — TELEPHONE (OUTPATIENT)
Dept: NEPHROLOGY | Age: 83
End: 2018-04-20

## 2018-04-20 ENCOUNTER — CARE COORDINATION (OUTPATIENT)
Dept: CASE MANAGEMENT | Age: 83
End: 2018-04-20

## 2018-04-20 LAB
ALBUMIN SERPL-MCNC: 3.2 G/DL (ref 3.5–5.1)
ALP BLD-CCNC: 175 U/L (ref 38–126)
ALT SERPL-CCNC: 31 U/L (ref 11–66)
ANION GAP SERPL CALCULATED.3IONS-SCNC: 15 MEQ/L (ref 8–16)
AST SERPL-CCNC: 20 U/L (ref 5–40)
BILIRUB SERPL-MCNC: 0.4 MG/DL (ref 0.3–1.2)
BUN BLDV-MCNC: 59 MG/DL (ref 7–22)
CALCIUM SERPL-MCNC: 8 MG/DL (ref 8.5–10.5)
CHLORIDE BLD-SCNC: 107 MEQ/L (ref 98–111)
CO2: 22 MEQ/L (ref 23–33)
CREAT SERPL-MCNC: 3.6 MG/DL (ref 0.4–1.2)
GFR SERPL CREATININE-BSD FRML MDRD: 16 ML/MIN/1.73M2
GLUCOSE BLD-MCNC: 75 MG/DL (ref 70–108)
HCT VFR BLD CALC: 20.2 % (ref 42–52)
HEMOGLOBIN: 6.8 GM/DL (ref 14–18)
MAGNESIUM: 2 MG/DL (ref 1.6–2.4)
MCH RBC QN AUTO: 31.4 PG (ref 27–31)
MCHC RBC AUTO-ENTMCNC: 33.5 GM/DL (ref 33–37)
MCV RBC AUTO: 93.5 FL (ref 80–94)
PATHOLOGIST REVIEW: ABNORMAL
PDW BLD-RTO: 14.4 % (ref 11.5–14.5)
PLATELET # BLD: 261 THOU/MM3 (ref 130–400)
PMV BLD AUTO: 8.5 FL (ref 7.4–10.4)
POTASSIUM SERPL-SCNC: 4.2 MEQ/L (ref 3.5–5.2)
RBC # BLD: 2.16 MILL/MM3 (ref 4.7–6.1)
SODIUM BLD-SCNC: 144 MEQ/L (ref 135–145)
TOTAL PROTEIN: 5.3 G/DL (ref 6.1–8)
WBC # BLD: 7.5 THOU/MM3 (ref 4.8–10.8)

## 2018-04-20 PROCEDURE — 80053 COMPREHEN METABOLIC PANEL: CPT

## 2018-04-20 PROCEDURE — 85027 COMPLETE CBC AUTOMATED: CPT

## 2018-04-20 PROCEDURE — 83735 ASSAY OF MAGNESIUM: CPT

## 2018-04-23 ENCOUNTER — TELEPHONE (OUTPATIENT)
Dept: FAMILY MEDICINE CLINIC | Age: 83
End: 2018-04-23

## 2018-04-23 ENCOUNTER — CARE COORDINATION (OUTPATIENT)
Dept: CASE MANAGEMENT | Age: 83
End: 2018-04-23

## 2018-04-23 ENCOUNTER — APPOINTMENT (OUTPATIENT)
Dept: GENERAL RADIOLOGY | Age: 83
DRG: 208 | End: 2018-04-23
Payer: MEDICARE

## 2018-04-23 ENCOUNTER — TELEPHONE (OUTPATIENT)
Dept: NEPHROLOGY | Age: 83
End: 2018-04-23

## 2018-04-23 ENCOUNTER — HOSPITAL ENCOUNTER (INPATIENT)
Age: 83
LOS: 9 days | Discharge: HOME HEALTH | DRG: 208 | End: 2018-05-02
Attending: FAMILY MEDICINE | Admitting: INTERNAL MEDICINE
Payer: MEDICARE

## 2018-04-23 DIAGNOSIS — J44.1 COPD EXACERBATION (HCC): Primary | ICD-10-CM

## 2018-04-23 DIAGNOSIS — J96.01 ACUTE RESPIRATORY FAILURE WITH HYPOXIA AND HYPERCAPNIA (HCC): ICD-10-CM

## 2018-04-23 DIAGNOSIS — J96.02 ACUTE RESPIRATORY FAILURE WITH HYPOXIA AND HYPERCAPNIA (HCC): ICD-10-CM

## 2018-04-23 DIAGNOSIS — N17.9 AKI (ACUTE KIDNEY INJURY) (HCC): ICD-10-CM

## 2018-04-23 DIAGNOSIS — I50.20 SYSTOLIC CONGESTIVE HEART FAILURE, UNSPECIFIED CONGESTIVE HEART FAILURE CHRONICITY: ICD-10-CM

## 2018-04-23 PROBLEM — J96.90 RESPIRATORY FAILURE (HCC): Status: ACTIVE | Noted: 2018-04-23

## 2018-04-23 LAB
ALBUMIN SERPL-MCNC: 3.8 G/DL (ref 3.5–5.1)
ALLEN TEST: POSITIVE
ALP BLD-CCNC: 213 U/L (ref 38–126)
ALT SERPL-CCNC: 22 U/L (ref 11–66)
ANION GAP SERPL CALCULATED.3IONS-SCNC: 13 MEQ/L (ref 8–16)
ANISOCYTOSIS: ABNORMAL
APTT: 35.7 SECONDS (ref 22–38)
AST SERPL-CCNC: 15 U/L (ref 5–40)
BACTERIA: ABNORMAL /HPF
BASE EXCESS (CALCULATED): -5.7 MMOL/L (ref -2.5–2.5)
BASE EXCESS (CALCULATED): -6.8 MMOL/L (ref -2.5–2.5)
BASE EXCESS (CALCULATED): -7.9 MMOL/L (ref -2.5–2.5)
BASOPHILS # BLD: 0.5 %
BASOPHILS ABSOLUTE: 0 THOU/MM3 (ref 0–0.1)
BILIRUB SERPL-MCNC: 0.5 MG/DL (ref 0.3–1.2)
BILIRUBIN URINE: NEGATIVE
BLOOD, URINE: ABNORMAL
BUN BLDV-MCNC: 50 MG/DL (ref 7–22)
CALCIUM SERPL-MCNC: 9 MG/DL (ref 8.5–10.5)
CASTS 2: ABNORMAL /LPF
CASTS UA: ABNORMAL /LPF
CHARACTER, URINE: CLEAR
CHLORIDE BLD-SCNC: 107 MEQ/L (ref 98–111)
CO2: 20 MEQ/L (ref 23–33)
COLLECTED BY:: ABNORMAL
COLOR: YELLOW
CREAT SERPL-MCNC: 3.5 MG/DL (ref 0.4–1.2)
CRYSTALS, UA: ABNORMAL
DEVICE: ABNORMAL
EKG ATRIAL RATE: 84 BPM
EKG P AXIS: 61 DEGREES
EKG P-R INTERVAL: 178 MS
EKG Q-T INTERVAL: 354 MS
EKG QRS DURATION: 70 MS
EKG QTC CALCULATION (BAZETT): 418 MS
EKG R AXIS: -19 DEGREES
EKG T AXIS: 30 DEGREES
EKG VENTRICULAR RATE: 84 BPM
EOSINOPHIL # BLD: 2.4 %
EOSINOPHILS ABSOLUTE: 0.2 THOU/MM3 (ref 0–0.4)
EPITHELIAL CELLS, UA: ABNORMAL /HPF
GFR SERPL CREATININE-BSD FRML MDRD: 17 ML/MIN/1.73M2
GLUCOSE BLD-MCNC: 101 MG/DL (ref 70–108)
GLUCOSE URINE: NEGATIVE MG/DL
HCO3: 18 MMOL/L (ref 23–28)
HCO3: 19 MMOL/L (ref 23–28)
HCO3: 19 MMOL/L (ref 23–28)
HCT VFR BLD CALC: 24.6 % (ref 42–52)
HEMOGLOBIN: 8.4 GM/DL (ref 14–18)
IFIO2: 100
IFIO2: 60
INR BLD: 1.39 (ref 0.85–1.13)
KETONES, URINE: NEGATIVE
LACTIC ACID: 0.8 MMOL/L (ref 0.5–2.2)
LEUKOCYTE ESTERASE, URINE: NEGATIVE
LIPASE: 113.3 U/L (ref 5.6–51.3)
LYMPHOCYTES # BLD: 12.7 %
LYMPHOCYTES ABSOLUTE: 1.1 THOU/MM3 (ref 1–4.8)
MCH RBC QN AUTO: 32.2 PG (ref 27–31)
MCHC RBC AUTO-ENTMCNC: 34.3 GM/DL (ref 33–37)
MCV RBC AUTO: 93.8 FL (ref 80–94)
MISCELLANEOUS 2: ABNORMAL
MODE: AC
MODE: AC
MONOCYTES # BLD: 7.1 %
MONOCYTES ABSOLUTE: 0.6 THOU/MM3 (ref 0.4–1.3)
NITRITE, URINE: NEGATIVE
NUCLEATED RED BLOOD CELLS: 0 /100 WBC
O2 SATURATION: 95 %
O2 SATURATION: 98 %
O2 SATURATION: 98 %
OSMOLALITY CALCULATION: 292.9 MOSMOL/KG (ref 275–300)
PCO2: 25 MMHG (ref 35–45)
PCO2: 38 MMHG (ref 35–45)
PCO2: 43 MMHG (ref 35–45)
PDW BLD-RTO: 15.5 % (ref 11.5–14.5)
PH BLOOD GAS: 7.25 (ref 7.35–7.45)
PH BLOOD GAS: 7.31 (ref 7.35–7.45)
PH BLOOD GAS: 7.45 (ref 7.35–7.45)
PH UA: 6.5
PLATELET # BLD: 345 THOU/MM3 (ref 130–400)
PMV BLD AUTO: 8.4 FL (ref 7.4–10.4)
PO2: 111 MMHG (ref 71–104)
PO2: 126 MMHG (ref 71–104)
PO2: 69 MMHG (ref 71–104)
POTASSIUM SERPL-SCNC: 4.3 MEQ/L (ref 3.5–5.2)
PRO-BNP: 8852 PG/ML (ref 0–1800)
PROTEIN UA: 30
RBC # BLD: 2.62 MILL/MM3 (ref 4.7–6.1)
RBC URINE: ABNORMAL /HPF
RENAL EPITHELIAL, UA: ABNORMAL
SEG NEUTROPHILS: 77.3 %
SEGMENTED NEUTROPHILS ABSOLUTE COUNT: 6.6 THOU/MM3 (ref 1.8–7.7)
SET PEEP: 5 MMHG
SET PEEP: 5 MMHG
SET RESPIRATORY RATE: 20 BPM
SET RESPIRATORY RATE: 20 BPM
SET TIDAL VOLUME: 440 ML
SET TIDAL VOLUME: 440 ML
SODIUM BLD-SCNC: 140 MEQ/L (ref 135–145)
SOURCE, BLOOD GAS: ABNORMAL
SPECIFIC GRAVITY, URINE: 1.01 (ref 1–1.03)
TOTAL PROTEIN: 6.6 G/DL (ref 6.1–8)
TROPONIN T: 0.06 NG/ML
UROBILINOGEN, URINE: 0.2 EU/DL
WBC # BLD: 8.5 THOU/MM3 (ref 4.8–10.8)
WBC UA: ABNORMAL /HPF
YEAST: ABNORMAL

## 2018-04-23 PROCEDURE — 36556 INSERT NON-TUNNEL CV CATH: CPT

## 2018-04-23 PROCEDURE — 36600 WITHDRAWAL OF ARTERIAL BLOOD: CPT

## 2018-04-23 PROCEDURE — 05HM33Z INSERTION OF INFUSION DEVICE INTO RIGHT INTERNAL JUGULAR VEIN, PERCUTANEOUS APPROACH: ICD-10-PCS | Performed by: RADIOLOGY

## 2018-04-23 PROCEDURE — 71046 X-RAY EXAM CHEST 2 VIEWS: CPT

## 2018-04-23 PROCEDURE — 81001 URINALYSIS AUTO W/SCOPE: CPT

## 2018-04-23 PROCEDURE — 96365 THER/PROPH/DIAG IV INF INIT: CPT

## 2018-04-23 PROCEDURE — 99223 1ST HOSP IP/OBS HIGH 75: CPT | Performed by: INTERNAL MEDICINE

## 2018-04-23 PROCEDURE — 96366 THER/PROPH/DIAG IV INF ADDON: CPT

## 2018-04-23 PROCEDURE — 6360000002 HC RX W HCPCS

## 2018-04-23 PROCEDURE — 71045 X-RAY EXAM CHEST 1 VIEW: CPT

## 2018-04-23 PROCEDURE — 83605 ASSAY OF LACTIC ACID: CPT

## 2018-04-23 PROCEDURE — 93010 ELECTROCARDIOGRAM REPORT: CPT | Performed by: INTERNAL MEDICINE

## 2018-04-23 PROCEDURE — 85025 COMPLETE CBC W/AUTO DIFF WBC: CPT

## 2018-04-23 PROCEDURE — 85018 HEMOGLOBIN: CPT

## 2018-04-23 PROCEDURE — 85610 PROTHROMBIN TIME: CPT

## 2018-04-23 PROCEDURE — 96375 TX/PRO/DX INJ NEW DRUG ADDON: CPT

## 2018-04-23 PROCEDURE — 84484 ASSAY OF TROPONIN QUANT: CPT

## 2018-04-23 PROCEDURE — 36415 COLL VENOUS BLD VENIPUNCTURE: CPT

## 2018-04-23 PROCEDURE — 99285 EMERGENCY DEPT VISIT HI MDM: CPT

## 2018-04-23 PROCEDURE — 83690 ASSAY OF LIPASE: CPT

## 2018-04-23 PROCEDURE — 83880 ASSAY OF NATRIURETIC PEPTIDE: CPT

## 2018-04-23 PROCEDURE — 0BH17EZ INSERTION OF ENDOTRACHEAL AIRWAY INTO TRACHEA, VIA NATURAL OR ARTIFICIAL OPENING: ICD-10-PCS | Performed by: FAMILY MEDICINE

## 2018-04-23 PROCEDURE — 93005 ELECTROCARDIOGRAM TRACING: CPT | Performed by: FAMILY MEDICINE

## 2018-04-23 PROCEDURE — 6360000002 HC RX W HCPCS: Performed by: FAMILY MEDICINE

## 2018-04-23 PROCEDURE — 31500 INSERT EMERGENCY AIRWAY: CPT

## 2018-04-23 PROCEDURE — 94002 VENT MGMT INPAT INIT DAY: CPT

## 2018-04-23 PROCEDURE — 94760 N-INVAS EAR/PLS OXIMETRY 1: CPT

## 2018-04-23 PROCEDURE — 2500000003 HC RX 250 WO HCPCS: Performed by: FAMILY MEDICINE

## 2018-04-23 PROCEDURE — 2000000000 HC ICU R&B

## 2018-04-23 PROCEDURE — 5A1945Z RESPIRATORY VENTILATION, 24-96 CONSECUTIVE HOURS: ICD-10-PCS | Performed by: FAMILY MEDICINE

## 2018-04-23 PROCEDURE — 87040 BLOOD CULTURE FOR BACTERIA: CPT

## 2018-04-23 PROCEDURE — 2580000003 HC RX 258: Performed by: FAMILY MEDICINE

## 2018-04-23 PROCEDURE — 80053 COMPREHEN METABOLIC PANEL: CPT

## 2018-04-23 PROCEDURE — 85730 THROMBOPLASTIN TIME PARTIAL: CPT

## 2018-04-23 PROCEDURE — 82803 BLOOD GASES ANY COMBINATION: CPT

## 2018-04-23 RX ORDER — LEVOFLOXACIN 5 MG/ML
750 INJECTION, SOLUTION INTRAVENOUS ONCE
Status: COMPLETED | OUTPATIENT
Start: 2018-04-23 | End: 2018-04-23

## 2018-04-23 RX ORDER — PROPOFOL 10 MG/ML
INJECTION, EMULSION INTRAVENOUS
Status: COMPLETED
Start: 2018-04-23 | End: 2018-04-23

## 2018-04-23 RX ORDER — ETOMIDATE 2 MG/ML
INJECTION INTRAVENOUS DAILY PRN
Status: COMPLETED | OUTPATIENT
Start: 2018-04-23 | End: 2018-04-23

## 2018-04-23 RX ORDER — PROPOFOL 10 MG/ML
10 INJECTION, EMULSION INTRAVENOUS
Status: DISCONTINUED | OUTPATIENT
Start: 2018-04-23 | End: 2018-04-26

## 2018-04-23 RX ORDER — PANTOPRAZOLE SODIUM 40 MG/10ML
40 INJECTION, POWDER, LYOPHILIZED, FOR SOLUTION INTRAVENOUS DAILY
Status: DISCONTINUED | OUTPATIENT
Start: 2018-04-24 | End: 2018-04-26

## 2018-04-23 RX ORDER — VANCOMYCIN HYDROCHLORIDE 1 G/200ML
1000 INJECTION, SOLUTION INTRAVENOUS ONCE
Status: DISCONTINUED | OUTPATIENT
Start: 2018-04-23 | End: 2018-04-23 | Stop reason: SDUPTHER

## 2018-04-23 RX ORDER — IPRATROPIUM BROMIDE AND ALBUTEROL SULFATE 2.5; .5 MG/3ML; MG/3ML
1 SOLUTION RESPIRATORY (INHALATION)
Status: DISCONTINUED | OUTPATIENT
Start: 2018-04-24 | End: 2018-04-25

## 2018-04-23 RX ORDER — FUROSEMIDE 10 MG/ML
80 INJECTION INTRAMUSCULAR; INTRAVENOUS ONCE
Status: COMPLETED | OUTPATIENT
Start: 2018-04-23 | End: 2018-04-23

## 2018-04-23 RX ORDER — CHLORHEXIDINE GLUCONATE 0.12 MG/ML
15 RINSE ORAL 2 TIMES DAILY
Status: DISCONTINUED | OUTPATIENT
Start: 2018-04-23 | End: 2018-04-26

## 2018-04-23 RX ORDER — SUCCINYLCHOLINE CHLORIDE 20 MG/ML
INJECTION INTRAMUSCULAR; INTRAVENOUS DAILY PRN
Status: COMPLETED | OUTPATIENT
Start: 2018-04-23 | End: 2018-04-23

## 2018-04-23 RX ADMIN — LEVOFLOXACIN 750 MG: 5 INJECTION, SOLUTION INTRAVENOUS at 19:58

## 2018-04-23 RX ADMIN — SUCCINYLCHOLINE CHLORIDE 100 MG: 20 INJECTION, SOLUTION INTRAMUSCULAR; INTRAVENOUS at 19:10

## 2018-04-23 RX ADMIN — PROPOFOL 10 MCG/KG/MIN: 10 INJECTION, EMULSION INTRAVENOUS at 19:25

## 2018-04-23 RX ADMIN — PROPOFOL 45 MCG/KG/MIN: 10 INJECTION, EMULSION INTRAVENOUS at 22:41

## 2018-04-23 RX ADMIN — ETOMIDATE 10 MG: 2 INJECTION INTRAVENOUS at 19:09

## 2018-04-23 RX ADMIN — VANCOMYCIN HYDROCHLORIDE 1000 MG: 1 INJECTION, POWDER, LYOPHILIZED, FOR SOLUTION INTRAVENOUS at 21:35

## 2018-04-23 RX ADMIN — FUROSEMIDE 80 MG: 10 INJECTION, SOLUTION INTRAMUSCULAR; INTRAVENOUS at 18:52

## 2018-04-23 ASSESSMENT — ENCOUNTER SYMPTOMS
VOMITING: 0
WHEEZING: 0
EYE DISCHARGE: 0
DIARRHEA: 0
SORE THROAT: 1
EYE REDNESS: 0
RHINORRHEA: 0
SHORTNESS OF BREATH: 1
ABDOMINAL PAIN: 0
COUGH: 0
BACK PAIN: 0
NAUSEA: 0

## 2018-04-23 ASSESSMENT — PULMONARY FUNCTION TESTS: PIF_VALUE: 31

## 2018-04-24 ENCOUNTER — APPOINTMENT (OUTPATIENT)
Dept: CT IMAGING | Age: 83
DRG: 208 | End: 2018-04-24
Payer: MEDICARE

## 2018-04-24 LAB
ALLEN TEST: POSITIVE
ANION GAP SERPL CALCULATED.3IONS-SCNC: 17 MEQ/L (ref 8–16)
ANION GAP SERPL CALCULATED.3IONS-SCNC: 22 MEQ/L (ref 8–16)
APTT: 183.6 SECONDS (ref 22–38)
APTT: 33.1 SECONDS (ref 22–38)
BACTERIA: ABNORMAL
BASE EXCESS (CALCULATED): -3 MMOL/L (ref -2.5–2.5)
BILIRUBIN URINE: NEGATIVE
BLOOD, URINE: ABNORMAL
BUN BLDV-MCNC: 46 MG/DL (ref 7–22)
BUN BLDV-MCNC: 49 MG/DL (ref 7–22)
CALCIUM SERPL-MCNC: 8.5 MG/DL (ref 8.5–10.5)
CALCIUM SERPL-MCNC: 8.6 MG/DL (ref 8.5–10.5)
CASTS: ABNORMAL /LPF
CASTS: ABNORMAL /LPF
CHARACTER, URINE: CLEAR
CHLORIDE BLD-SCNC: 105 MEQ/L (ref 98–111)
CHLORIDE BLD-SCNC: 111 MEQ/L (ref 98–111)
CO2: 17 MEQ/L (ref 23–33)
CO2: 18 MEQ/L (ref 23–33)
COLLECTED BY:: ABNORMAL
COLOR: YELLOW
CREAT SERPL-MCNC: 3.4 MG/DL (ref 0.4–1.2)
CREAT SERPL-MCNC: 3.6 MG/DL (ref 0.4–1.2)
CRYSTALS: ABNORMAL
DEVICE: ABNORMAL
EKG ATRIAL RATE: 187 BPM
EKG Q-T INTERVAL: 328 MS
EKG QRS DURATION: 80 MS
EKG QTC CALCULATION (BAZETT): 514 MS
EKG R AXIS: -23 DEGREES
EKG T AXIS: 94 DEGREES
EKG VENTRICULAR RATE: 148 BPM
EPITHELIAL CELLS, UA: ABNORMAL /HPF
GFR SERPL CREATININE-BSD FRML MDRD: 16 ML/MIN/1.73M2
GFR SERPL CREATININE-BSD FRML MDRD: 17 ML/MIN/1.73M2
GLUCOSE BLD-MCNC: 153 MG/DL (ref 70–108)
GLUCOSE BLD-MCNC: 66 MG/DL (ref 70–108)
GLUCOSE, URINE: NEGATIVE MG/DL
HCO3: 21 MMOL/L (ref 23–28)
HCT VFR BLD CALC: 22.5 % (ref 42–52)
HCT VFR BLD CALC: 23.4 % (ref 42–52)
HCT VFR BLD CALC: 24.9 % (ref 42–52)
HEMOGLOBIN: 7.5 GM/DL (ref 14–18)
HEMOGLOBIN: 7.6 GM/DL (ref 14–18)
HEMOGLOBIN: 8.1 GM/DL (ref 14–18)
HEMOGLOBIN: 8.2 GM/DL (ref 14–18)
IFIO2: 40
KETONES, URINE: NEGATIVE
LEUKOCYTE EST, POC: NEGATIVE
MAGNESIUM: 1.9 MG/DL (ref 1.6–2.4)
MCH RBC QN AUTO: 31.3 PG (ref 27–31)
MCH RBC QN AUTO: 31.9 PG (ref 27–31)
MCH RBC QN AUTO: 32.3 PG (ref 27–31)
MCHC RBC AUTO-ENTMCNC: 33 GM/DL (ref 33–37)
MCHC RBC AUTO-ENTMCNC: 33.9 GM/DL (ref 33–37)
MCHC RBC AUTO-ENTMCNC: 34.4 GM/DL (ref 33–37)
MCV RBC AUTO: 93.8 FL (ref 80–94)
MCV RBC AUTO: 94.3 FL (ref 80–94)
MCV RBC AUTO: 94.8 FL (ref 80–94)
MISCELLANEOUS LAB TEST RESULT: ABNORMAL
MODE: AC
MRSA SCREEN RT-PCR: NEGATIVE
NITRITE, URINE: NEGATIVE
O2 SATURATION: 95 %
PCO2: 31 MMHG (ref 35–45)
PDW BLD-RTO: 14.6 % (ref 11.5–14.5)
PDW BLD-RTO: 14.7 % (ref 11.5–14.5)
PDW BLD-RTO: 15.6 % (ref 11.5–14.5)
PH BLOOD GAS: 7.44 (ref 7.35–7.45)
PH UA: 5.5
PLATELET # BLD: 306 THOU/MM3 (ref 130–400)
PLATELET # BLD: 312 THOU/MM3 (ref 130–400)
PLATELET # BLD: 327 THOU/MM3 (ref 130–400)
PMV BLD AUTO: 8.3 FL (ref 7.4–10.4)
PMV BLD AUTO: 8.5 FL (ref 7.4–10.4)
PMV BLD AUTO: 8.9 FL (ref 7.4–10.4)
PO2: 74 MMHG (ref 71–104)
POTASSIUM SERPL-SCNC: 3.9 MEQ/L (ref 3.5–5.2)
POTASSIUM SERPL-SCNC: 4.3 MEQ/L (ref 3.5–5.2)
PROCALCITONIN: 0.24 NG/ML (ref 0.01–0.09)
PROTEIN UA: NEGATIVE MG/DL
RBC # BLD: 2.38 MILL/MM3 (ref 4.7–6.1)
RBC # BLD: 2.5 MILL/MM3 (ref 4.7–6.1)
RBC # BLD: 2.63 MILL/MM3 (ref 4.7–6.1)
RBC URINE: ABNORMAL /HPF
RENAL EPITHELIAL, UA: ABNORMAL
SET PEEP: 5 MMHG
SET RESPIRATORY RATE: 20 BPM
SET TIDAL VOLUME: 440 ML
SODIUM BLD-SCNC: 144 MEQ/L (ref 135–145)
SODIUM BLD-SCNC: 146 MEQ/L (ref 135–145)
SOURCE, BLOOD GAS: ABNORMAL
SPECIFIC GRAVITY UA: 1.01 (ref 1–1.03)
TROPONIN T: 0.07 NG/ML
TROPONIN T: 0.08 NG/ML
UROBILINOGEN, URINE: 0.2 EU/DL
WBC # BLD: 12.3 THOU/MM3 (ref 4.8–10.8)
WBC # BLD: 8.2 THOU/MM3 (ref 4.8–10.8)
WBC # BLD: 9.6 THOU/MM3 (ref 4.8–10.8)
WBC UA: ABNORMAL /HPF
YEAST: ABNORMAL

## 2018-04-24 PROCEDURE — 85730 THROMBOPLASTIN TIME PARTIAL: CPT

## 2018-04-24 PROCEDURE — 6360000002 HC RX W HCPCS: Performed by: PHYSICIAN ASSISTANT

## 2018-04-24 PROCEDURE — 6370000000 HC RX 637 (ALT 250 FOR IP): Performed by: FAMILY MEDICINE

## 2018-04-24 PROCEDURE — 99291 CRITICAL CARE FIRST HOUR: CPT | Performed by: INTERNAL MEDICINE

## 2018-04-24 PROCEDURE — 2000000000 HC ICU R&B

## 2018-04-24 PROCEDURE — 2500000003 HC RX 250 WO HCPCS

## 2018-04-24 PROCEDURE — 6360000002 HC RX W HCPCS: Performed by: FAMILY MEDICINE

## 2018-04-24 PROCEDURE — 84484 ASSAY OF TROPONIN QUANT: CPT

## 2018-04-24 PROCEDURE — 71250 CT THORAX DX C-: CPT

## 2018-04-24 PROCEDURE — 80048 BASIC METABOLIC PNL TOTAL CA: CPT

## 2018-04-24 PROCEDURE — 81001 URINALYSIS AUTO W/SCOPE: CPT

## 2018-04-24 PROCEDURE — 74176 CT ABD & PELVIS W/O CONTRAST: CPT

## 2018-04-24 PROCEDURE — 87449 NOS EACH ORGANISM AG IA: CPT

## 2018-04-24 PROCEDURE — 36600 WITHDRAWAL OF ARTERIAL BLOOD: CPT

## 2018-04-24 PROCEDURE — 99222 1ST HOSP IP/OBS MODERATE 55: CPT | Performed by: PHYSICIAN ASSISTANT

## 2018-04-24 PROCEDURE — 6370000000 HC RX 637 (ALT 250 FOR IP): Performed by: INTERNAL MEDICINE

## 2018-04-24 PROCEDURE — 6360000002 HC RX W HCPCS: Performed by: INTERNAL MEDICINE

## 2018-04-24 PROCEDURE — 94003 VENT MGMT INPAT SUBQ DAY: CPT

## 2018-04-24 PROCEDURE — 87641 MR-STAPH DNA AMP PROBE: CPT

## 2018-04-24 PROCEDURE — C9113 INJ PANTOPRAZOLE SODIUM, VIA: HCPCS | Performed by: INTERNAL MEDICINE

## 2018-04-24 PROCEDURE — 87070 CULTURE OTHR SPECIMN AEROBIC: CPT

## 2018-04-24 PROCEDURE — 2580000003 HC RX 258: Performed by: INTERNAL MEDICINE

## 2018-04-24 PROCEDURE — 99222 1ST HOSP IP/OBS MODERATE 55: CPT | Performed by: INTERNAL MEDICINE

## 2018-04-24 PROCEDURE — 36415 COLL VENOUS BLD VENIPUNCTURE: CPT

## 2018-04-24 PROCEDURE — 94640 AIRWAY INHALATION TREATMENT: CPT

## 2018-04-24 PROCEDURE — 87205 SMEAR GRAM STAIN: CPT

## 2018-04-24 PROCEDURE — 83735 ASSAY OF MAGNESIUM: CPT

## 2018-04-24 PROCEDURE — 85027 COMPLETE CBC AUTOMATED: CPT

## 2018-04-24 PROCEDURE — 84145 PROCALCITONIN (PCT): CPT

## 2018-04-24 PROCEDURE — 2700000000 HC OXYGEN THERAPY PER DAY

## 2018-04-24 PROCEDURE — 87899 AGENT NOS ASSAY W/OPTIC: CPT

## 2018-04-24 PROCEDURE — 87081 CULTURE SCREEN ONLY: CPT

## 2018-04-24 PROCEDURE — 87086 URINE CULTURE/COLONY COUNT: CPT

## 2018-04-24 PROCEDURE — 82803 BLOOD GASES ANY COMBINATION: CPT

## 2018-04-24 RX ORDER — FUROSEMIDE 10 MG/ML
60 INJECTION INTRAMUSCULAR; INTRAVENOUS EVERY 8 HOURS
Status: COMPLETED | OUTPATIENT
Start: 2018-04-24 | End: 2018-04-25

## 2018-04-24 RX ORDER — HEPARIN SODIUM 1000 [USP'U]/ML
80 INJECTION, SOLUTION INTRAVENOUS; SUBCUTANEOUS PRN
Status: DISCONTINUED | OUTPATIENT
Start: 2018-04-24 | End: 2018-04-27

## 2018-04-24 RX ORDER — DIGOXIN 0.25 MG/ML
125 INJECTION INTRAMUSCULAR; INTRAVENOUS ONCE
Status: COMPLETED | OUTPATIENT
Start: 2018-04-24 | End: 2018-04-24

## 2018-04-24 RX ORDER — HEPARIN SODIUM 10000 [USP'U]/100ML
18 INJECTION, SOLUTION INTRAVENOUS CONTINUOUS
Status: DISCONTINUED | OUTPATIENT
Start: 2018-04-24 | End: 2018-04-27

## 2018-04-24 RX ORDER — HEPARIN SODIUM 1000 [USP'U]/ML
80 INJECTION, SOLUTION INTRAVENOUS; SUBCUTANEOUS ONCE
Status: COMPLETED | OUTPATIENT
Start: 2018-04-24 | End: 2018-04-24

## 2018-04-24 RX ORDER — METOPROLOL TARTRATE 5 MG/5ML
INJECTION INTRAVENOUS
Status: COMPLETED
Start: 2018-04-24 | End: 2018-04-24

## 2018-04-24 RX ORDER — ONDANSETRON 2 MG/ML
4 INJECTION INTRAMUSCULAR; INTRAVENOUS EVERY 6 HOURS PRN
Status: DISCONTINUED | OUTPATIENT
Start: 2018-04-24 | End: 2018-04-27

## 2018-04-24 RX ORDER — HEPARIN SODIUM 1000 [USP'U]/ML
40 INJECTION, SOLUTION INTRAVENOUS; SUBCUTANEOUS PRN
Status: DISCONTINUED | OUTPATIENT
Start: 2018-04-24 | End: 2018-04-27

## 2018-04-24 RX ORDER — METOPROLOL TARTRATE 5 MG/5ML
5 INJECTION INTRAVENOUS ONCE
Status: COMPLETED | OUTPATIENT
Start: 2018-04-24 | End: 2018-04-24

## 2018-04-24 RX ADMIN — PROPOFOL 40 MCG/KG/MIN: 10 INJECTION, EMULSION INTRAVENOUS at 18:09

## 2018-04-24 RX ADMIN — METOPROLOL TARTRATE 5 MG: 5 INJECTION INTRAVENOUS at 19:00

## 2018-04-24 RX ADMIN — DIGOXIN 125 MCG: 250 INJECTION, SOLUTION INTRAMUSCULAR; INTRAVENOUS; PARENTERAL at 23:34

## 2018-04-24 RX ADMIN — FUROSEMIDE 60 MG: 10 INJECTION, SOLUTION INTRAMUSCULAR; INTRAVENOUS at 21:26

## 2018-04-24 RX ADMIN — HEPARIN SODIUM 6100 UNITS: 1000 INJECTION, SOLUTION INTRAVENOUS; SUBCUTANEOUS at 13:02

## 2018-04-24 RX ADMIN — FUROSEMIDE 60 MG: 10 INJECTION, SOLUTION INTRAMUSCULAR; INTRAVENOUS at 14:26

## 2018-04-24 RX ADMIN — Medication 15 ML: at 00:59

## 2018-04-24 RX ADMIN — Medication 15 ML: at 10:15

## 2018-04-24 RX ADMIN — PROPOFOL 40 MCG/KG/MIN: 10 INJECTION, EMULSION INTRAVENOUS at 11:58

## 2018-04-24 RX ADMIN — PROPOFOL 45 MCG/KG/MIN: 10 INJECTION, EMULSION INTRAVENOUS at 03:59

## 2018-04-24 RX ADMIN — PROPOFOL 40 MCG/KG/MIN: 10 INJECTION, EMULSION INTRAVENOUS at 23:40

## 2018-04-24 RX ADMIN — IPRATROPIUM BROMIDE AND ALBUTEROL SULFATE 1 AMPULE: .5; 3 SOLUTION RESPIRATORY (INHALATION) at 17:10

## 2018-04-24 RX ADMIN — PANTOPRAZOLE SODIUM 40 MG: 40 INJECTION, POWDER, FOR SOLUTION INTRAVENOUS at 10:15

## 2018-04-24 RX ADMIN — HEPARIN SODIUM 18 UNITS/KG/HR: 10000 INJECTION, SOLUTION INTRAVENOUS at 13:07

## 2018-04-24 RX ADMIN — PROPOFOL 50 MCG/KG/MIN: 10 INJECTION, EMULSION INTRAVENOUS at 07:09

## 2018-04-24 RX ADMIN — METOPROLOL TARTRATE 5 MG: 5 INJECTION, SOLUTION INTRAVENOUS at 19:00

## 2018-04-24 RX ADMIN — IPRATROPIUM BROMIDE AND ALBUTEROL SULFATE 1 AMPULE: .5; 3 SOLUTION RESPIRATORY (INHALATION) at 14:06

## 2018-04-24 RX ADMIN — Medication 15 ML: at 21:07

## 2018-04-24 RX ADMIN — MEROPENEM 1 G: 1 INJECTION, POWDER, FOR SOLUTION INTRAVENOUS at 22:47

## 2018-04-24 RX ADMIN — IPRATROPIUM BROMIDE AND ALBUTEROL SULFATE 1 AMPULE: .5; 3 SOLUTION RESPIRATORY (INHALATION) at 09:56

## 2018-04-24 ASSESSMENT — PULMONARY FUNCTION TESTS
PIF_VALUE: 21
PIF_VALUE: 18
PIF_VALUE: 18
PIF_VALUE: 16
PIF_VALUE: 16
PIF_VALUE: 20

## 2018-04-24 ASSESSMENT — PAIN SCALES - GENERAL
PAINLEVEL_OUTOF10: 0

## 2018-04-25 ENCOUNTER — APPOINTMENT (OUTPATIENT)
Dept: GENERAL RADIOLOGY | Age: 83
DRG: 208 | End: 2018-04-25
Payer: MEDICARE

## 2018-04-25 ENCOUNTER — TELEPHONE (OUTPATIENT)
Dept: FAMILY MEDICINE CLINIC | Age: 83
End: 2018-04-25

## 2018-04-25 LAB
ALLEN TEST: POSITIVE
ANION GAP SERPL CALCULATED.3IONS-SCNC: 19 MEQ/L (ref 8–16)
APTT: 73.5 SECONDS (ref 22–38)
APTT: 91.2 SECONDS (ref 22–38)
APTT: 92.3 SECONDS (ref 22–38)
BASE EXCESS (CALCULATED): -2.3 MMOL/L (ref -2.5–2.5)
BUN BLDV-MCNC: 47 MG/DL (ref 7–22)
CALCIUM SERPL-MCNC: 9 MG/DL (ref 8.5–10.5)
CHLORIDE BLD-SCNC: 103 MEQ/L (ref 98–111)
CO2: 20 MEQ/L (ref 23–33)
COLLECTED BY:: ABNORMAL
CREAT SERPL-MCNC: 3.7 MG/DL (ref 0.4–1.2)
DEVICE: ABNORMAL
GFR SERPL CREATININE-BSD FRML MDRD: 16 ML/MIN/1.73M2
GLUCOSE BLD-MCNC: 174 MG/DL (ref 70–108)
HCO3: 21 MMOL/L (ref 23–28)
HCT VFR BLD CALC: 25.7 % (ref 42–52)
HEMOGLOBIN: 8.3 GM/DL (ref 14–18)
HEMOGLOBIN: 8.3 GM/DL (ref 14–18)
HEMOGLOBIN: 8.4 GM/DL (ref 14–18)
IFIO2: 30
MAGNESIUM: 1.9 MG/DL (ref 1.6–2.4)
MCH RBC QN AUTO: 30.8 PG (ref 27–31)
MCHC RBC AUTO-ENTMCNC: 32.8 GM/DL (ref 33–37)
MCV RBC AUTO: 93.9 FL (ref 80–94)
MODE: AC
O2 SATURATION: 97 %
PCO2: 30 MMHG (ref 35–45)
PDW BLD-RTO: 14.9 % (ref 11.5–14.5)
PH BLOOD GAS: 7.45 (ref 7.35–7.45)
PLATELET # BLD: 314 THOU/MM3 (ref 130–400)
PMV BLD AUTO: 8.6 FL (ref 7.4–10.4)
PO2: 80 MMHG (ref 71–104)
POTASSIUM SERPL-SCNC: 4.3 MEQ/L (ref 3.5–5.2)
RBC # BLD: 2.74 MILL/MM3 (ref 4.7–6.1)
SET PEEP: 5 MMHG
SET RESPIRATORY RATE: 20 BPM
SET TIDAL VOLUME: 440 ML
SODIUM BLD-SCNC: 142 MEQ/L (ref 135–145)
SOURCE, BLOOD GAS: ABNORMAL
WBC # BLD: 11.7 THOU/MM3 (ref 4.8–10.8)

## 2018-04-25 PROCEDURE — 2580000003 HC RX 258: Performed by: INTERNAL MEDICINE

## 2018-04-25 PROCEDURE — 71045 X-RAY EXAM CHEST 1 VIEW: CPT

## 2018-04-25 PROCEDURE — 6360000002 HC RX W HCPCS: Performed by: INTERNAL MEDICINE

## 2018-04-25 PROCEDURE — 85027 COMPLETE CBC AUTOMATED: CPT

## 2018-04-25 PROCEDURE — 6360000002 HC RX W HCPCS: Performed by: FAMILY MEDICINE

## 2018-04-25 PROCEDURE — 83735 ASSAY OF MAGNESIUM: CPT

## 2018-04-25 PROCEDURE — 74018 RADEX ABDOMEN 1 VIEW: CPT

## 2018-04-25 PROCEDURE — 36415 COLL VENOUS BLD VENIPUNCTURE: CPT

## 2018-04-25 PROCEDURE — 6370000000 HC RX 637 (ALT 250 FOR IP): Performed by: INTERNAL MEDICINE

## 2018-04-25 PROCEDURE — 94003 VENT MGMT INPAT SUBQ DAY: CPT

## 2018-04-25 PROCEDURE — 2500000003 HC RX 250 WO HCPCS: Performed by: PHYSICIAN ASSISTANT

## 2018-04-25 PROCEDURE — 99291 CRITICAL CARE FIRST HOUR: CPT | Performed by: INTERNAL MEDICINE

## 2018-04-25 PROCEDURE — 2000000000 HC ICU R&B

## 2018-04-25 PROCEDURE — C9113 INJ PANTOPRAZOLE SODIUM, VIA: HCPCS | Performed by: INTERNAL MEDICINE

## 2018-04-25 PROCEDURE — 2700000000 HC OXYGEN THERAPY PER DAY

## 2018-04-25 PROCEDURE — 85730 THROMBOPLASTIN TIME PARTIAL: CPT

## 2018-04-25 PROCEDURE — 94640 AIRWAY INHALATION TREATMENT: CPT

## 2018-04-25 PROCEDURE — 6370000000 HC RX 637 (ALT 250 FOR IP): Performed by: FAMILY MEDICINE

## 2018-04-25 PROCEDURE — 82803 BLOOD GASES ANY COMBINATION: CPT

## 2018-04-25 PROCEDURE — 99232 SBSQ HOSP IP/OBS MODERATE 35: CPT | Performed by: INTERNAL MEDICINE

## 2018-04-25 PROCEDURE — 80048 BASIC METABOLIC PNL TOTAL CA: CPT

## 2018-04-25 PROCEDURE — 85018 HEMOGLOBIN: CPT

## 2018-04-25 PROCEDURE — 36600 WITHDRAWAL OF ARTERIAL BLOOD: CPT

## 2018-04-25 PROCEDURE — 2500000003 HC RX 250 WO HCPCS: Performed by: INTERNAL MEDICINE

## 2018-04-25 RX ORDER — LEVALBUTEROL 1.25 MG/.5ML
1.25 SOLUTION, CONCENTRATE RESPIRATORY (INHALATION)
Status: DISCONTINUED | OUTPATIENT
Start: 2018-04-25 | End: 2018-04-27

## 2018-04-25 RX ORDER — ESMOLOL HYDROCHLORIDE 10 MG/ML
50 INJECTION, SOLUTION INTRAVENOUS CONTINUOUS
Status: DISCONTINUED | OUTPATIENT
Start: 2018-04-25 | End: 2018-04-27

## 2018-04-25 RX ORDER — METOPROLOL TARTRATE 5 MG/5ML
5 INJECTION INTRAVENOUS ONCE
Status: COMPLETED | OUTPATIENT
Start: 2018-04-25 | End: 2018-04-25

## 2018-04-25 RX ORDER — POLYETHYLENE GLYCOL 3350, SODIUM CHLORIDE, SODIUM BICARBONATE, POTASSIUM CHLORIDE 420; 11.2; 5.72; 1.48 G/4L; G/4L; G/4L; G/4L
240 POWDER, FOR SOLUTION ORAL
Status: DISCONTINUED | OUTPATIENT
Start: 2018-04-25 | End: 2018-04-25

## 2018-04-25 RX ORDER — DILTIAZEM HYDROCHLORIDE 5 MG/ML
10 INJECTION INTRAVENOUS ONCE
Status: DISCONTINUED | OUTPATIENT
Start: 2018-04-25 | End: 2018-04-25

## 2018-04-25 RX ORDER — SODIUM BICARBONATE 650 MG/1
650 TABLET ORAL 2 TIMES DAILY
Status: DISCONTINUED | OUTPATIENT
Start: 2018-04-25 | End: 2018-05-02 | Stop reason: HOSPADM

## 2018-04-25 RX ORDER — SODIUM CHLORIDE FOR INHALATION 0.9 %
3 VIAL, NEBULIZER (ML) INHALATION
Status: DISCONTINUED | OUTPATIENT
Start: 2018-04-25 | End: 2018-04-27

## 2018-04-25 RX ORDER — BISACODYL 10 MG
10 SUPPOSITORY, RECTAL RECTAL DAILY PRN
Status: DISCONTINUED | OUTPATIENT
Start: 2018-04-25 | End: 2018-05-02 | Stop reason: HOSPADM

## 2018-04-25 RX ADMIN — ISODIUM CHLORIDE 3 ML: 0.03 SOLUTION RESPIRATORY (INHALATION) at 16:29

## 2018-04-25 RX ADMIN — ESMOLOL HYDROCHLORIDE 200 MCG/KG/MIN: 10 INJECTION INTRAVENOUS at 23:53

## 2018-04-25 RX ADMIN — MEROPENEM 1 G: 1 INJECTION, POWDER, FOR SOLUTION INTRAVENOUS at 07:45

## 2018-04-25 RX ADMIN — ESMOLOL HYDROCHLORIDE 150 MCG/KG/MIN: 10 INJECTION INTRAVENOUS at 15:11

## 2018-04-25 RX ADMIN — PROPOFOL 30 MCG/KG/MIN: 10 INJECTION, EMULSION INTRAVENOUS at 10:04

## 2018-04-25 RX ADMIN — ISODIUM CHLORIDE 3 ML: 0.03 SOLUTION RESPIRATORY (INHALATION) at 20:10

## 2018-04-25 RX ADMIN — HEPARIN SODIUM 14 UNITS/KG/HR: 10000 INJECTION, SOLUTION INTRAVENOUS at 11:55

## 2018-04-25 RX ADMIN — FUROSEMIDE 60 MG: 10 INJECTION, SOLUTION INTRAMUSCULAR; INTRAVENOUS at 05:52

## 2018-04-25 RX ADMIN — ESMOLOL HYDROCHLORIDE 50 MCG/KG/MIN: 10 INJECTION INTRAVENOUS at 18:37

## 2018-04-25 RX ADMIN — METOPROLOL TARTRATE 5 MG: 5 INJECTION, SOLUTION INTRAVENOUS at 01:12

## 2018-04-25 RX ADMIN — PROPOFOL 40 MCG/KG/MIN: 10 INJECTION, EMULSION INTRAVENOUS at 04:53

## 2018-04-25 RX ADMIN — Medication 15 ML: at 20:46

## 2018-04-25 RX ADMIN — ESMOLOL HYDROCHLORIDE 50 MCG/KG/MIN: 10 INJECTION INTRAVENOUS at 10:04

## 2018-04-25 RX ADMIN — LEVALBUTEROL HYDROCHLORIDE 1.25 MG: 1.25 SOLUTION, CONCENTRATE RESPIRATORY (INHALATION) at 20:10

## 2018-04-25 RX ADMIN — SODIUM BICARBONATE 650 MG: 650 TABLET ORAL at 10:12

## 2018-04-25 RX ADMIN — ESMOLOL HYDROCHLORIDE 200 MCG/KG/MIN: 10 INJECTION INTRAVENOUS at 21:33

## 2018-04-25 RX ADMIN — PANTOPRAZOLE SODIUM 40 MG: 40 INJECTION, POWDER, FOR SOLUTION INTRAVENOUS at 10:10

## 2018-04-25 RX ADMIN — IPRATROPIUM BROMIDE AND ALBUTEROL SULFATE 1 AMPULE: .5; 3 SOLUTION RESPIRATORY (INHALATION) at 08:34

## 2018-04-25 RX ADMIN — LEVALBUTEROL HYDROCHLORIDE 1.25 MG: 1.25 SOLUTION, CONCENTRATE RESPIRATORY (INHALATION) at 16:30

## 2018-04-25 RX ADMIN — PROPOFOL 20 MCG/KG/MIN: 10 INJECTION, EMULSION INTRAVENOUS at 21:48

## 2018-04-25 RX ADMIN — IPRATROPIUM BROMIDE AND ALBUTEROL SULFATE 1 AMPULE: .5; 3 SOLUTION RESPIRATORY (INHALATION) at 12:32

## 2018-04-25 RX ADMIN — SODIUM BICARBONATE 650 MG: 650 TABLET ORAL at 20:46

## 2018-04-25 RX ADMIN — MEROPENEM 1 G: 1 INJECTION, POWDER, FOR SOLUTION INTRAVENOUS at 19:32

## 2018-04-25 RX ADMIN — Medication 15 ML: at 16:55

## 2018-04-25 ASSESSMENT — PAIN SCALES - GENERAL
PAINLEVEL_OUTOF10: 0

## 2018-04-25 ASSESSMENT — PULMONARY FUNCTION TESTS
PIF_VALUE: 17
PIF_VALUE: 16
PIF_VALUE: 15

## 2018-04-25 NOTE — TELEPHONE ENCOUNTER
4-20-18    Received a call from Esperanza Garcia from [address] stating that his hgb was 6.8, hct 20.2. Creat 3.6 and pt has been advised to go to the hospital earlier but apparently they have not gone yet.   I told her that I strongly suggest that he go to the hospital for further eval.  MA/april

## 2018-04-26 LAB
ALLEN TEST: POSITIVE
ANION GAP SERPL CALCULATED.3IONS-SCNC: 16 MEQ/L (ref 8–16)
APTT: 58.9 SECONDS (ref 22–38)
APTT: 60.3 SECONDS (ref 22–38)
APTT: 88.8 SECONDS (ref 22–38)
BASE EXCESS (CALCULATED): -1.5 MMOL/L (ref -2.5–2.5)
BUN BLDV-MCNC: 53 MG/DL (ref 7–22)
CALCIUM SERPL-MCNC: 8.4 MG/DL (ref 8.5–10.5)
CHLORIDE BLD-SCNC: 105 MEQ/L (ref 98–111)
CO2: 21 MEQ/L (ref 23–33)
COLLECTED BY:: ABNORMAL
CREAT SERPL-MCNC: 3.7 MG/DL (ref 0.4–1.2)
DEVICE: ABNORMAL
GFR SERPL CREATININE-BSD FRML MDRD: 16 ML/MIN/1.73M2
GLUCOSE BLD-MCNC: 141 MG/DL (ref 70–108)
GRAM STAIN RESULT: NORMAL
HCO3: 22 MMOL/L (ref 23–28)
HCT VFR BLD CALC: 21.9 % (ref 42–52)
HEMOGLOBIN: 7 GM/DL (ref 14–18)
HEMOGLOBIN: 7.5 GM/DL (ref 14–18)
HEMOGLOBIN: 7.6 GM/DL (ref 14–18)
IFIO2: 30
LEGIONELLA URINARY AG: NEGATIVE
MAGNESIUM: 1.9 MG/DL (ref 1.6–2.4)
MCH RBC QN AUTO: 32.2 PG (ref 27–31)
MCHC RBC AUTO-ENTMCNC: 34.2 GM/DL (ref 33–37)
MCV RBC AUTO: 94.2 FL (ref 80–94)
MODE: ABNORMAL
MRSA SCREEN: NORMAL
O2 SATURATION: 98 %
PCO2: 30 MMHG (ref 35–45)
PDW BLD-RTO: 15.8 % (ref 11.5–14.5)
PH BLOOD GAS: 7.47 (ref 7.35–7.45)
PLATELET # BLD: 285 THOU/MM3 (ref 130–400)
PMV BLD AUTO: 8.9 FL (ref 7.4–10.4)
PO2: 89 MMHG (ref 71–104)
POTASSIUM SERPL-SCNC: 4.5 MEQ/L (ref 3.5–5.2)
RBC # BLD: 2.32 MILL/MM3 (ref 4.7–6.1)
RESPIRATORY CULTURE: NORMAL
SET PEEP: 5 MMHG
SET PRESS SUPP: 6 CMH2O
SODIUM BLD-SCNC: 142 MEQ/L (ref 135–145)
SOURCE, BLOOD GAS: ABNORMAL
URINE CULTURE, ROUTINE: NORMAL
VRE CULTURE: NORMAL
WBC # BLD: 9.1 THOU/MM3 (ref 4.8–10.8)

## 2018-04-26 PROCEDURE — 99231 SBSQ HOSP IP/OBS SF/LOW 25: CPT | Performed by: PHYSICIAN ASSISTANT

## 2018-04-26 PROCEDURE — 6360000002 HC RX W HCPCS: Performed by: INTERNAL MEDICINE

## 2018-04-26 PROCEDURE — 36600 WITHDRAWAL OF ARTERIAL BLOOD: CPT

## 2018-04-26 PROCEDURE — 2500000003 HC RX 250 WO HCPCS: Performed by: INTERNAL MEDICINE

## 2018-04-26 PROCEDURE — 85730 THROMBOPLASTIN TIME PARTIAL: CPT

## 2018-04-26 PROCEDURE — 92610 EVALUATE SWALLOWING FUNCTION: CPT

## 2018-04-26 PROCEDURE — 80048 BASIC METABOLIC PNL TOTAL CA: CPT

## 2018-04-26 PROCEDURE — 85027 COMPLETE CBC AUTOMATED: CPT

## 2018-04-26 PROCEDURE — 99232 SBSQ HOSP IP/OBS MODERATE 35: CPT | Performed by: INTERNAL MEDICINE

## 2018-04-26 PROCEDURE — 2700000000 HC OXYGEN THERAPY PER DAY

## 2018-04-26 PROCEDURE — 6370000000 HC RX 637 (ALT 250 FOR IP): Performed by: INTERNAL MEDICINE

## 2018-04-26 PROCEDURE — 2000000000 HC ICU R&B

## 2018-04-26 PROCEDURE — 99291 CRITICAL CARE FIRST HOUR: CPT | Performed by: INTERNAL MEDICINE

## 2018-04-26 PROCEDURE — 36591 DRAW BLOOD OFF VENOUS DEVICE: CPT

## 2018-04-26 PROCEDURE — 94003 VENT MGMT INPAT SUBQ DAY: CPT

## 2018-04-26 PROCEDURE — 2580000003 HC RX 258: Performed by: INTERNAL MEDICINE

## 2018-04-26 PROCEDURE — 85018 HEMOGLOBIN: CPT

## 2018-04-26 PROCEDURE — 82803 BLOOD GASES ANY COMBINATION: CPT

## 2018-04-26 PROCEDURE — 83735 ASSAY OF MAGNESIUM: CPT

## 2018-04-26 PROCEDURE — 94640 AIRWAY INHALATION TREATMENT: CPT

## 2018-04-26 RX ORDER — POLYETHYLENE GLYCOL 3350 17 G/17G
17 POWDER, FOR SOLUTION ORAL DAILY
Status: DISCONTINUED | OUTPATIENT
Start: 2018-04-26 | End: 2018-04-27

## 2018-04-26 RX ORDER — FAMOTIDINE 20 MG/1
20 TABLET, FILM COATED ORAL DAILY
Status: DISCONTINUED | OUTPATIENT
Start: 2018-04-26 | End: 2018-05-02 | Stop reason: HOSPADM

## 2018-04-26 RX ADMIN — ESMOLOL HYDROCHLORIDE 200 MCG/KG/MIN: 10 INJECTION INTRAVENOUS at 11:19

## 2018-04-26 RX ADMIN — ESMOLOL HYDROCHLORIDE 50 MCG/KG/MIN: 10 INJECTION INTRAVENOUS at 05:39

## 2018-04-26 RX ADMIN — LEVALBUTEROL HYDROCHLORIDE 1.25 MG: 1.25 SOLUTION, CONCENTRATE RESPIRATORY (INHALATION) at 20:20

## 2018-04-26 RX ADMIN — LEVALBUTEROL HYDROCHLORIDE 1.25 MG: 1.25 SOLUTION, CONCENTRATE RESPIRATORY (INHALATION) at 16:15

## 2018-04-26 RX ADMIN — ESMOLOL HYDROCHLORIDE 50 MCG/KG/MIN: 10 INJECTION INTRAVENOUS at 07:59

## 2018-04-26 RX ADMIN — ESMOLOL HYDROCHLORIDE 200 MCG/KG/MIN: 10 INJECTION INTRAVENOUS at 17:04

## 2018-04-26 RX ADMIN — LEVALBUTEROL HYDROCHLORIDE 1.25 MG: 1.25 SOLUTION, CONCENTRATE RESPIRATORY (INHALATION) at 12:29

## 2018-04-26 RX ADMIN — ESMOLOL HYDROCHLORIDE 200 MCG/KG/MIN: 10 INJECTION INTRAVENOUS at 13:54

## 2018-04-26 RX ADMIN — MEROPENEM 1 G: 1 INJECTION, POWDER, FOR SOLUTION INTRAVENOUS at 07:57

## 2018-04-26 RX ADMIN — MEROPENEM 1 G: 1 INJECTION, POWDER, FOR SOLUTION INTRAVENOUS at 19:39

## 2018-04-26 RX ADMIN — LEVALBUTEROL HYDROCHLORIDE 1.25 MG: 1.25 SOLUTION, CONCENTRATE RESPIRATORY (INHALATION) at 07:53

## 2018-04-26 RX ADMIN — HEPARIN SODIUM 14 UNITS/KG/HR: 10000 INJECTION, SOLUTION INTRAVENOUS at 12:13

## 2018-04-26 RX ADMIN — ESMOLOL HYDROCHLORIDE 200 MCG/KG/MIN: 10 INJECTION INTRAVENOUS at 02:51

## 2018-04-26 RX ADMIN — BISACODYL 10 MG: 10 SUPPOSITORY RECTAL at 09:40

## 2018-04-26 RX ADMIN — SODIUM BICARBONATE 650 MG: 650 TABLET ORAL at 21:43

## 2018-04-26 RX ADMIN — ESMOLOL HYDROCHLORIDE 200 MCG/KG/MIN: 10 INJECTION INTRAVENOUS at 19:52

## 2018-04-26 RX ADMIN — ESMOLOL HYDROCHLORIDE 200 MCG/KG/MIN: 10 INJECTION INTRAVENOUS at 22:12

## 2018-04-26 ASSESSMENT — PULMONARY FUNCTION TESTS
PIF_VALUE: 12
PIF_VALUE: 17
PIF_VALUE: 18

## 2018-04-26 ASSESSMENT — PAIN SCALES - GENERAL: PAINLEVEL_OUTOF10: 0

## 2018-04-27 ENCOUNTER — APPOINTMENT (OUTPATIENT)
Dept: GENERAL RADIOLOGY | Age: 83
DRG: 208 | End: 2018-04-27
Payer: MEDICARE

## 2018-04-27 PROBLEM — E43 SEVERE MALNUTRITION (HCC): Chronic | Status: ACTIVE | Noted: 2018-04-27

## 2018-04-27 LAB
ABO: NORMAL
ANION GAP SERPL CALCULATED.3IONS-SCNC: 13 MEQ/L (ref 8–16)
ANTIBODY SCREEN: NORMAL
APTT: 68.9 SECONDS (ref 22–38)
APTT: 69.6 SECONDS (ref 22–38)
BUN BLDV-MCNC: 60 MG/DL (ref 7–22)
CALCIUM SERPL-MCNC: 7.9 MG/DL (ref 8.5–10.5)
CHLORIDE BLD-SCNC: 108 MEQ/L (ref 98–111)
CO2: 21 MEQ/L (ref 23–33)
CREAT SERPL-MCNC: 3.8 MG/DL (ref 0.4–1.2)
GFR SERPL CREATININE-BSD FRML MDRD: 15 ML/MIN/1.73M2
GLUCOSE BLD-MCNC: 101 MG/DL (ref 70–108)
GRAM STAIN RESULT: NORMAL
HCT VFR BLD CALC: 20.1 % (ref 42–52)
HEMOGLOBIN: 6.9 GM/DL (ref 14–18)
MAGNESIUM: 2.1 MG/DL (ref 1.6–2.4)
MCH RBC QN AUTO: 32.6 PG (ref 27–31)
MCHC RBC AUTO-ENTMCNC: 34.2 GM/DL (ref 33–37)
MCV RBC AUTO: 95.4 FL (ref 80–94)
PATHOLOGIST REVIEW: ABNORMAL
PDW BLD-RTO: 15.6 % (ref 11.5–14.5)
PLATELET # BLD: 248 THOU/MM3 (ref 130–400)
PMV BLD AUTO: 8.3 FL (ref 7.4–10.4)
POTASSIUM SERPL-SCNC: 4.7 MEQ/L (ref 3.5–5.2)
RBC # BLD: 2.1 MILL/MM3 (ref 4.7–6.1)
RESPIRATORY CULTURE: NORMAL
RH FACTOR: NORMAL
SODIUM BLD-SCNC: 142 MEQ/L (ref 135–145)
STREP PNEUMO AG, UR: NEGATIVE
WBC # BLD: 7.8 THOU/MM3 (ref 4.8–10.8)

## 2018-04-27 PROCEDURE — 85027 COMPLETE CBC AUTOMATED: CPT

## 2018-04-27 PROCEDURE — 86900 BLOOD TYPING SEROLOGIC ABO: CPT

## 2018-04-27 PROCEDURE — 1200000000 HC SEMI PRIVATE

## 2018-04-27 PROCEDURE — 6370000000 HC RX 637 (ALT 250 FOR IP): Performed by: INTERNAL MEDICINE

## 2018-04-27 PROCEDURE — 71045 X-RAY EXAM CHEST 1 VIEW: CPT

## 2018-04-27 PROCEDURE — 94640 AIRWAY INHALATION TREATMENT: CPT

## 2018-04-27 PROCEDURE — 85730 THROMBOPLASTIN TIME PARTIAL: CPT

## 2018-04-27 PROCEDURE — 92610 EVALUATE SWALLOWING FUNCTION: CPT

## 2018-04-27 PROCEDURE — 2580000003 HC RX 258: Performed by: INTERNAL MEDICINE

## 2018-04-27 PROCEDURE — 83735 ASSAY OF MAGNESIUM: CPT

## 2018-04-27 PROCEDURE — G8987 SELF CARE CURRENT STATUS: HCPCS

## 2018-04-27 PROCEDURE — 97166 OT EVAL MOD COMPLEX 45 MIN: CPT

## 2018-04-27 PROCEDURE — 6360000002 HC RX W HCPCS: Performed by: INTERNAL MEDICINE

## 2018-04-27 PROCEDURE — G8988 SELF CARE GOAL STATUS: HCPCS

## 2018-04-27 PROCEDURE — 86850 RBC ANTIBODY SCREEN: CPT

## 2018-04-27 PROCEDURE — P9016 RBC LEUKOCYTES REDUCED: HCPCS

## 2018-04-27 PROCEDURE — 99232 SBSQ HOSP IP/OBS MODERATE 35: CPT | Performed by: INTERNAL MEDICINE

## 2018-04-27 PROCEDURE — 99233 SBSQ HOSP IP/OBS HIGH 50: CPT | Performed by: INTERNAL MEDICINE

## 2018-04-27 PROCEDURE — 97110 THERAPEUTIC EXERCISES: CPT

## 2018-04-27 PROCEDURE — 2580000003 HC RX 258: Performed by: NURSE PRACTITIONER

## 2018-04-27 PROCEDURE — 86923 COMPATIBILITY TEST ELECTRIC: CPT

## 2018-04-27 PROCEDURE — 80048 BASIC METABOLIC PNL TOTAL CA: CPT

## 2018-04-27 PROCEDURE — 86901 BLOOD TYPING SEROLOGIC RH(D): CPT

## 2018-04-27 PROCEDURE — 2500000003 HC RX 250 WO HCPCS: Performed by: INTERNAL MEDICINE

## 2018-04-27 PROCEDURE — 36592 COLLECT BLOOD FROM PICC: CPT

## 2018-04-27 RX ORDER — ACETAMINOPHEN 325 MG/1
650 TABLET ORAL EVERY 4 HOURS PRN
Status: DISCONTINUED | OUTPATIENT
Start: 2018-04-27 | End: 2018-04-30

## 2018-04-27 RX ORDER — LEVALBUTEROL 1.25 MG/.5ML
1.25 SOLUTION, CONCENTRATE RESPIRATORY (INHALATION) EVERY 4 HOURS PRN
Status: DISCONTINUED | OUTPATIENT
Start: 2018-04-27 | End: 2018-05-02 | Stop reason: HOSPADM

## 2018-04-27 RX ORDER — LORAZEPAM 2 MG/ML
1 INJECTION INTRAMUSCULAR EVERY 6 HOURS PRN
Status: DISCONTINUED | OUTPATIENT
Start: 2018-04-27 | End: 2018-05-02 | Stop reason: HOSPADM

## 2018-04-27 RX ORDER — ONDANSETRON 2 MG/ML
4 INJECTION INTRAMUSCULAR; INTRAVENOUS EVERY 6 HOURS PRN
Status: DISCONTINUED | OUTPATIENT
Start: 2018-04-27 | End: 2018-05-02 | Stop reason: HOSPADM

## 2018-04-27 RX ORDER — 0.9 % SODIUM CHLORIDE 0.9 %
250 INTRAVENOUS SOLUTION INTRAVENOUS ONCE
Status: DISCONTINUED | OUTPATIENT
Start: 2018-04-27 | End: 2018-04-27

## 2018-04-27 RX ORDER — FUROSEMIDE 10 MG/ML
60 INJECTION INTRAMUSCULAR; INTRAVENOUS EVERY 8 HOURS
Status: DISCONTINUED | OUTPATIENT
Start: 2018-04-27 | End: 2018-04-27

## 2018-04-27 RX ORDER — METOPROLOL TARTRATE 50 MG/1
50 TABLET, FILM COATED ORAL 2 TIMES DAILY
Status: DISCONTINUED | OUTPATIENT
Start: 2018-04-27 | End: 2018-05-02 | Stop reason: HOSPADM

## 2018-04-27 RX ORDER — ESMOLOL HYDROCHLORIDE 10 MG/ML
INJECTION, SOLUTION INTRAVENOUS
Status: DISPENSED
Start: 2018-04-27 | End: 2018-04-28

## 2018-04-27 RX ORDER — MORPHINE SULFATE 20 MG/ML
5 SOLUTION ORAL
Status: DISCONTINUED | OUTPATIENT
Start: 2018-04-27 | End: 2018-05-02 | Stop reason: HOSPADM

## 2018-04-27 RX ORDER — SODIUM CHLORIDE 0.9 % (FLUSH) 0.9 %
10 SYRINGE (ML) INJECTION 2 TIMES DAILY
Status: DISCONTINUED | OUTPATIENT
Start: 2018-04-27 | End: 2018-04-27

## 2018-04-27 RX ORDER — FUROSEMIDE 40 MG/1
40 TABLET ORAL DAILY
Status: DISCONTINUED | OUTPATIENT
Start: 2018-04-27 | End: 2018-05-02 | Stop reason: HOSPADM

## 2018-04-27 RX ORDER — SCOLOPAMINE TRANSDERMAL SYSTEM 1 MG/1
1 PATCH, EXTENDED RELEASE TRANSDERMAL
Status: DISCONTINUED | OUTPATIENT
Start: 2018-04-27 | End: 2018-05-02 | Stop reason: HOSPADM

## 2018-04-27 RX ORDER — MORPHINE SULFATE 2 MG/ML
2 INJECTION, SOLUTION INTRAMUSCULAR; INTRAVENOUS
Status: DISCONTINUED | OUTPATIENT
Start: 2018-04-27 | End: 2018-05-02 | Stop reason: HOSPADM

## 2018-04-27 RX ADMIN — FUROSEMIDE 40 MG: 40 TABLET ORAL at 12:10

## 2018-04-27 RX ADMIN — FUROSEMIDE 60 MG: 10 INJECTION, SOLUTION INTRAMUSCULAR; INTRAVENOUS at 08:06

## 2018-04-27 RX ADMIN — METOPROLOL TARTRATE 50 MG: 50 TABLET, FILM COATED ORAL at 12:09

## 2018-04-27 RX ADMIN — ESMOLOL HYDROCHLORIDE 200 MCG/KG/MIN: 10 INJECTION INTRAVENOUS at 03:50

## 2018-04-27 RX ADMIN — LEVALBUTEROL HYDROCHLORIDE 1.25 MG: 1.25 SOLUTION, CONCENTRATE RESPIRATORY (INHALATION) at 08:48

## 2018-04-27 RX ADMIN — ESMOLOL HYDROCHLORIDE 200 MCG/KG/MIN: 10 INJECTION INTRAVENOUS at 01:39

## 2018-04-27 RX ADMIN — ESMOLOL HYDROCHLORIDE 200 MCG/KG/MIN: 10 INJECTION INTRAVENOUS at 10:05

## 2018-04-27 RX ADMIN — MORPHINE SULFATE 2 MG: 2 INJECTION, SOLUTION INTRAMUSCULAR; INTRAVENOUS at 22:58

## 2018-04-27 RX ADMIN — Medication 10 ML: at 08:39

## 2018-04-27 RX ADMIN — ESMOLOL HYDROCHLORIDE 200 MCG/KG/MIN: 10 INJECTION INTRAVENOUS at 07:19

## 2018-04-27 RX ADMIN — MEROPENEM 1 G: 1 INJECTION, POWDER, FOR SOLUTION INTRAVENOUS at 08:13

## 2018-04-27 RX ADMIN — FAMOTIDINE 20 MG: 20 TABLET ORAL at 08:12

## 2018-04-27 RX ADMIN — HEPARIN SODIUM 16 UNITS/KG/HR: 10000 INJECTION, SOLUTION INTRAVENOUS at 08:09

## 2018-04-27 RX ADMIN — SODIUM BICARBONATE 650 MG: 650 TABLET ORAL at 08:12

## 2018-04-27 RX ADMIN — SODIUM BICARBONATE 650 MG: 650 TABLET ORAL at 23:07

## 2018-04-27 RX ADMIN — SODIUM CHLORIDE 250 ML: 9 INJECTION, SOLUTION INTRAVENOUS at 07:20

## 2018-04-27 RX ADMIN — METOPROLOL TARTRATE 50 MG: 50 TABLET, FILM COATED ORAL at 23:07

## 2018-04-27 ASSESSMENT — PAIN SCALES - GENERAL
PAINLEVEL_OUTOF10: 0
PAINLEVEL_OUTOF10: 3
PAINLEVEL_OUTOF10: 0
PAINLEVEL_OUTOF10: 0

## 2018-04-28 PROBLEM — J90 BILATERAL PLEURAL EFFUSION: Status: ACTIVE | Noted: 2018-04-28

## 2018-04-28 PROBLEM — N18.4 ANEMIA IN STAGE 4 CHRONIC KIDNEY DISEASE (HCC): Status: ACTIVE | Noted: 2018-04-28

## 2018-04-28 PROBLEM — D63.1 ANEMIA IN STAGE 4 CHRONIC KIDNEY DISEASE (HCC): Status: ACTIVE | Noted: 2018-04-28

## 2018-04-28 LAB
FERRITIN: 321 NG/ML (ref 22–322)
IRON SATURATION: 13 % (ref 20–50)
IRON: 27 UG/DL (ref 65–195)
TOTAL IRON BINDING CAPACITY: 202 UG/DL (ref 171–450)

## 2018-04-28 PROCEDURE — 1200000000 HC SEMI PRIVATE

## 2018-04-28 PROCEDURE — 36430 TRANSFUSION BLD/BLD COMPNT: CPT

## 2018-04-28 PROCEDURE — 6370000000 HC RX 637 (ALT 250 FOR IP): Performed by: INTERNAL MEDICINE

## 2018-04-28 PROCEDURE — 2580000003 HC RX 258: Performed by: INTERNAL MEDICINE

## 2018-04-28 PROCEDURE — 83550 IRON BINDING TEST: CPT

## 2018-04-28 PROCEDURE — P9016 RBC LEUKOCYTES REDUCED: HCPCS

## 2018-04-28 PROCEDURE — 83540 ASSAY OF IRON: CPT

## 2018-04-28 PROCEDURE — 36592 COLLECT BLOOD FROM PICC: CPT

## 2018-04-28 PROCEDURE — 99232 SBSQ HOSP IP/OBS MODERATE 35: CPT | Performed by: INTERNAL MEDICINE

## 2018-04-28 PROCEDURE — 82728 ASSAY OF FERRITIN: CPT

## 2018-04-28 RX ORDER — 0.9 % SODIUM CHLORIDE 0.9 %
250 INTRAVENOUS SOLUTION INTRAVENOUS ONCE
Status: COMPLETED | OUTPATIENT
Start: 2018-04-28 | End: 2018-04-29

## 2018-04-28 RX ADMIN — METOPROLOL TARTRATE 50 MG: 50 TABLET, FILM COATED ORAL at 20:39

## 2018-04-28 RX ADMIN — FAMOTIDINE 20 MG: 20 TABLET ORAL at 08:42

## 2018-04-28 RX ADMIN — SODIUM BICARBONATE 650 MG: 650 TABLET ORAL at 20:39

## 2018-04-28 RX ADMIN — METOPROLOL TARTRATE 50 MG: 50 TABLET, FILM COATED ORAL at 08:42

## 2018-04-28 RX ADMIN — SODIUM BICARBONATE 650 MG: 650 TABLET ORAL at 08:42

## 2018-04-28 RX ADMIN — SODIUM CHLORIDE 250 ML: 9 INJECTION, SOLUTION INTRAVENOUS at 14:10

## 2018-04-28 RX ADMIN — FUROSEMIDE 40 MG: 40 TABLET ORAL at 08:42

## 2018-04-29 ENCOUNTER — APPOINTMENT (OUTPATIENT)
Dept: GENERAL RADIOLOGY | Age: 83
DRG: 208 | End: 2018-04-29
Payer: MEDICARE

## 2018-04-29 LAB
ANION GAP SERPL CALCULATED.3IONS-SCNC: 14 MEQ/L (ref 8–16)
BLOOD CULTURE, ROUTINE: NORMAL
BLOOD CULTURE, ROUTINE: NORMAL
BUN BLDV-MCNC: 71 MG/DL (ref 7–22)
CALCIUM SERPL-MCNC: 8.4 MG/DL (ref 8.5–10.5)
CHLORIDE BLD-SCNC: 109 MEQ/L (ref 98–111)
CO2: 22 MEQ/L (ref 23–33)
CREAT SERPL-MCNC: 3.4 MG/DL (ref 0.4–1.2)
GFR SERPL CREATININE-BSD FRML MDRD: 17 ML/MIN/1.73M2
GLUCOSE BLD-MCNC: 136 MG/DL (ref 70–108)
HCT VFR BLD CALC: 28.8 % (ref 42–52)
HEMOGLOBIN: 9.6 GM/DL (ref 14–18)
POTASSIUM SERPL-SCNC: 4 MEQ/L (ref 3.5–5.2)
SODIUM BLD-SCNC: 145 MEQ/L (ref 135–145)

## 2018-04-29 PROCEDURE — G8979 MOBILITY GOAL STATUS: HCPCS

## 2018-04-29 PROCEDURE — 87075 CULTR BACTERIA EXCEPT BLOOD: CPT

## 2018-04-29 PROCEDURE — G8978 MOBILITY CURRENT STATUS: HCPCS

## 2018-04-29 PROCEDURE — 99232 SBSQ HOSP IP/OBS MODERATE 35: CPT | Performed by: INTERNAL MEDICINE

## 2018-04-29 PROCEDURE — 99233 SBSQ HOSP IP/OBS HIGH 50: CPT | Performed by: INTERNAL MEDICINE

## 2018-04-29 PROCEDURE — 80048 BASIC METABOLIC PNL TOTAL CA: CPT

## 2018-04-29 PROCEDURE — 6360000002 HC RX W HCPCS: Performed by: INTERNAL MEDICINE

## 2018-04-29 PROCEDURE — 6370000000 HC RX 637 (ALT 250 FOR IP): Performed by: INTERNAL MEDICINE

## 2018-04-29 PROCEDURE — 97110 THERAPEUTIC EXERCISES: CPT

## 2018-04-29 PROCEDURE — 2700000000 HC OXYGEN THERAPY PER DAY

## 2018-04-29 PROCEDURE — 85018 HEMOGLOBIN: CPT

## 2018-04-29 PROCEDURE — 36592 COLLECT BLOOD FROM PICC: CPT

## 2018-04-29 PROCEDURE — 71046 X-RAY EXAM CHEST 2 VIEWS: CPT

## 2018-04-29 PROCEDURE — 1200000000 HC SEMI PRIVATE

## 2018-04-29 PROCEDURE — 87070 CULTURE OTHR SPECIMN AEROBIC: CPT

## 2018-04-29 PROCEDURE — 87205 SMEAR GRAM STAIN: CPT

## 2018-04-29 PROCEDURE — 97161 PT EVAL LOW COMPLEX 20 MIN: CPT

## 2018-04-29 PROCEDURE — 85014 HEMATOCRIT: CPT

## 2018-04-29 RX ORDER — ATORVASTATIN CALCIUM 10 MG/1
10 TABLET, FILM COATED ORAL NIGHTLY
Status: DISCONTINUED | OUTPATIENT
Start: 2018-04-29 | End: 2018-05-02 | Stop reason: HOSPADM

## 2018-04-29 RX ORDER — DRONABINOL 2.5 MG/1
2.5 CAPSULE ORAL 2 TIMES DAILY
Status: DISCONTINUED | OUTPATIENT
Start: 2018-04-29 | End: 2018-05-02 | Stop reason: HOSPADM

## 2018-04-29 RX ORDER — CLOPIDOGREL BISULFATE 75 MG/1
75 TABLET ORAL DAILY
Status: DISCONTINUED | OUTPATIENT
Start: 2018-04-29 | End: 2018-04-29

## 2018-04-29 RX ORDER — CLONIDINE HYDROCHLORIDE 0.1 MG/1
0.1 TABLET ORAL 2 TIMES DAILY
Status: DISCONTINUED | OUTPATIENT
Start: 2018-04-29 | End: 2018-05-02 | Stop reason: HOSPADM

## 2018-04-29 RX ADMIN — CLONIDINE HYDROCHLORIDE 0.1 MG: 0.1 TABLET ORAL at 10:48

## 2018-04-29 RX ADMIN — FAMOTIDINE 20 MG: 20 TABLET ORAL at 08:10

## 2018-04-29 RX ADMIN — SODIUM BICARBONATE 650 MG: 650 TABLET ORAL at 21:29

## 2018-04-29 RX ADMIN — METOPROLOL TARTRATE 50 MG: 50 TABLET, FILM COATED ORAL at 08:10

## 2018-04-29 RX ADMIN — ATORVASTATIN CALCIUM 10 MG: 10 TABLET, FILM COATED ORAL at 21:29

## 2018-04-29 RX ADMIN — DRONABINOL 2.5 MG: 2.5 CAPSULE ORAL at 21:29

## 2018-04-29 RX ADMIN — DRONABINOL 2.5 MG: 2.5 CAPSULE ORAL at 14:03

## 2018-04-29 RX ADMIN — SODIUM BICARBONATE 650 MG: 650 TABLET ORAL at 08:10

## 2018-04-29 RX ADMIN — METOPROLOL TARTRATE 50 MG: 50 TABLET, FILM COATED ORAL at 21:29

## 2018-04-29 RX ADMIN — CLONIDINE HYDROCHLORIDE 0.1 MG: 0.1 TABLET ORAL at 21:29

## 2018-04-29 RX ADMIN — FUROSEMIDE 40 MG: 40 TABLET ORAL at 08:10

## 2018-04-29 ASSESSMENT — PAIN SCALES - GENERAL: PAINLEVEL_OUTOF10: 0

## 2018-04-30 ENCOUNTER — APPOINTMENT (OUTPATIENT)
Dept: ULTRASOUND IMAGING | Age: 83
DRG: 208 | End: 2018-04-30
Payer: MEDICARE

## 2018-04-30 ENCOUNTER — APPOINTMENT (OUTPATIENT)
Dept: GENERAL RADIOLOGY | Age: 83
DRG: 208 | End: 2018-04-30
Payer: MEDICARE

## 2018-04-30 LAB
ALBUMIN SERPL-MCNC: 3.3 G/DL (ref 3.5–5.1)
ALP BLD-CCNC: 140 U/L (ref 38–126)
ALT SERPL-CCNC: 18 U/L (ref 11–66)
ANION GAP SERPL CALCULATED.3IONS-SCNC: 12 MEQ/L (ref 8–16)
ANISOCYTOSIS: ABNORMAL
APTT: 28.1 SECONDS (ref 22–38)
AST SERPL-CCNC: 20 U/L (ref 5–40)
BACTERIA: ABNORMAL
BASOPHILS # BLD: 0.7 %
BASOPHILS ABSOLUTE: 0.1 THOU/MM3 (ref 0–0.1)
BILIRUB SERPL-MCNC: 0.6 MG/DL (ref 0.3–1.2)
BILIRUBIN DIRECT: < 0.2 MG/DL (ref 0–0.3)
BILIRUBIN URINE: NEGATIVE
BLOOD, URINE: NEGATIVE
BUN BLDV-MCNC: 63 MG/DL (ref 7–22)
CALCIUM SERPL-MCNC: 8.2 MG/DL (ref 8.5–10.5)
CASTS: ABNORMAL /LPF
CASTS: ABNORMAL /LPF
CHARACTER, BODY FLUID: CLEAR
CHARACTER, URINE: CLEAR
CHLORIDE BLD-SCNC: 108 MEQ/L (ref 98–111)
CO2: 24 MEQ/L (ref 23–33)
COLOR: YELLOW
COLOR: YELLOW
CREAT SERPL-MCNC: 3.2 MG/DL (ref 0.4–1.2)
CRYSTALS: ABNORMAL
EOSINOPHIL # BLD: 3.3 %
EOSINOPHILS ABSOLUTE: 0.3 THOU/MM3 (ref 0–0.4)
EPITHELIAL CELLS, UA: ABNORMAL /HPF
GFR SERPL CREATININE-BSD FRML MDRD: 19 ML/MIN/1.73M2
GLUCOSE BLD-MCNC: 151 MG/DL (ref 70–108)
GLUCOSE, FLUID: 141 MG/DL
GLUCOSE, URINE: NEGATIVE MG/DL
HCT VFR BLD CALC: 29.3 % (ref 42–52)
HEMOGLOBIN: 9.9 GM/DL (ref 14–18)
INR BLD: 1.15 (ref 0.85–1.13)
KETONES, URINE: NEGATIVE
LD, FLUID: 107 U/L
LD: 294 U/L (ref 100–190)
LD: 306 U/L (ref 100–190)
LEUKOCYTE ESTERASE, URINE: NEGATIVE
LYMPHOCYTES # BLD: 6.8 %
LYMPHOCYTES ABSOLUTE: 0.6 THOU/MM3 (ref 1–4.8)
LYMPHOCYTES, BODY FLUID: 21 % (ref 25–100)
MAGNESIUM: 2.1 MG/DL (ref 1.6–2.4)
MCH RBC QN AUTO: 30.6 PG (ref 27–31)
MCHC RBC AUTO-ENTMCNC: 33.7 GM/DL (ref 33–37)
MCV RBC AUTO: 90.9 FL (ref 80–94)
MISCELLANEOUS LAB TEST RESULT: ABNORMAL
MONOCYTE, FLUID: 67 % (ref 25–100)
MONOCYTES # BLD: 7.5 %
MONOCYTES ABSOLUTE: 0.7 THOU/MM3 (ref 0.4–1.3)
NITRITE, URINE: NEGATIVE
NUCLEATED RED BLOOD CELLS: 0 /100 WBC
PDW BLD-RTO: 16.6 % (ref 11.5–14.5)
PH FLUID: 7.59
PH UA: 5.5
PLATELET # BLD: 254 THOU/MM3 (ref 130–400)
PMV BLD AUTO: 8 FL (ref 7.4–10.4)
POTASSIUM SERPL-SCNC: 3.9 MEQ/L (ref 3.5–5.2)
PRO-BNP: ABNORMAL PG/ML (ref 0–1800)
PROTEIN FLUID: 1.4 GM/DL
PROTEIN UA: ABNORMAL MG/DL
RBC # BLD: 3.23 MILL/MM3 (ref 4.7–6.1)
RBC FLUID: 51 /CUMM (ref 0–100)
RBC URINE: ABNORMAL /HPF
RENAL EPITHELIAL, UA: ABNORMAL
SEG NEUTROPHILS: 81.7 %
SEGMENTED NEUTROPHILS ABSOLUTE COUNT: 7.4 THOU/MM3 (ref 1.8–7.7)
SEGMENTED NEUTROPHILS, BODY FLUID: 12 % (ref 0–25)
SODIUM BLD-SCNC: 144 MEQ/L (ref 135–145)
SPECIFIC GRAVITY UA: 1.02 (ref 1–1.03)
SPECIMEN: ABNORMAL
TOTAL PROTEIN: 5.1 G/DL (ref 6.1–8)
UROBILINOGEN, URINE: 0.2 EU/DL
WBC # BLD: 9.1 THOU/MM3 (ref 4.8–10.8)
WBC FLUID: 209 /MM3 (ref 0–500)
WBC UA: ABNORMAL /HPF
YEAST: ABNORMAL

## 2018-04-30 PROCEDURE — 6370000000 HC RX 637 (ALT 250 FOR IP): Performed by: INTERNAL MEDICINE

## 2018-04-30 PROCEDURE — 1200000000 HC SEMI PRIVATE

## 2018-04-30 PROCEDURE — 82945 GLUCOSE OTHER FLUID: CPT

## 2018-04-30 PROCEDURE — 83735 ASSAY OF MAGNESIUM: CPT

## 2018-04-30 PROCEDURE — 36592 COLLECT BLOOD FROM PICC: CPT

## 2018-04-30 PROCEDURE — 99233 SBSQ HOSP IP/OBS HIGH 50: CPT | Performed by: INTERNAL MEDICINE

## 2018-04-30 PROCEDURE — 85025 COMPLETE CBC W/AUTO DIFF WBC: CPT

## 2018-04-30 PROCEDURE — 82248 BILIRUBIN DIRECT: CPT

## 2018-04-30 PROCEDURE — 83880 ASSAY OF NATRIURETIC PEPTIDE: CPT

## 2018-04-30 PROCEDURE — 83615 LACTATE (LD) (LDH) ENZYME: CPT

## 2018-04-30 PROCEDURE — 80053 COMPREHEN METABOLIC PANEL: CPT

## 2018-04-30 PROCEDURE — 0W993ZZ DRAINAGE OF RIGHT PLEURAL CAVITY, PERCUTANEOUS APPROACH: ICD-10-PCS | Performed by: RADIOLOGY

## 2018-04-30 PROCEDURE — 85730 THROMBOPLASTIN TIME PARTIAL: CPT

## 2018-04-30 PROCEDURE — 97116 GAIT TRAINING THERAPY: CPT

## 2018-04-30 PROCEDURE — 71045 X-RAY EXAM CHEST 1 VIEW: CPT

## 2018-04-30 PROCEDURE — 81001 URINALYSIS AUTO W/SCOPE: CPT

## 2018-04-30 PROCEDURE — 92526 ORAL FUNCTION THERAPY: CPT

## 2018-04-30 PROCEDURE — 6360000002 HC RX W HCPCS: Performed by: INTERNAL MEDICINE

## 2018-04-30 PROCEDURE — 84157 ASSAY OF PROTEIN OTHER: CPT

## 2018-04-30 PROCEDURE — 89051 BODY FLUID CELL COUNT: CPT

## 2018-04-30 PROCEDURE — 83986 ASSAY PH BODY FLUID NOS: CPT

## 2018-04-30 PROCEDURE — 88305 TISSUE EXAM BY PATHOLOGIST: CPT

## 2018-04-30 PROCEDURE — 85610 PROTHROMBIN TIME: CPT

## 2018-04-30 PROCEDURE — 88112 CYTOPATH CELL ENHANCE TECH: CPT

## 2018-04-30 PROCEDURE — 32555 ASPIRATE PLEURA W/ IMAGING: CPT

## 2018-04-30 PROCEDURE — 87086 URINE CULTURE/COLONY COUNT: CPT

## 2018-04-30 PROCEDURE — 99232 SBSQ HOSP IP/OBS MODERATE 35: CPT | Performed by: NURSE PRACTITIONER

## 2018-04-30 RX ADMIN — ATORVASTATIN CALCIUM 10 MG: 10 TABLET, FILM COATED ORAL at 20:56

## 2018-04-30 RX ADMIN — SODIUM BICARBONATE 650 MG: 650 TABLET ORAL at 20:57

## 2018-04-30 RX ADMIN — CLONIDINE HYDROCHLORIDE 0.1 MG: 0.1 TABLET ORAL at 08:19

## 2018-04-30 RX ADMIN — DRONABINOL 2.5 MG: 2.5 CAPSULE ORAL at 20:57

## 2018-04-30 RX ADMIN — CLONIDINE HYDROCHLORIDE 0.1 MG: 0.1 TABLET ORAL at 20:56

## 2018-04-30 RX ADMIN — DRONABINOL 2.5 MG: 2.5 CAPSULE ORAL at 08:21

## 2018-04-30 RX ADMIN — METOPROLOL TARTRATE 50 MG: 50 TABLET, FILM COATED ORAL at 08:18

## 2018-04-30 RX ADMIN — FUROSEMIDE 40 MG: 40 TABLET ORAL at 08:18

## 2018-04-30 RX ADMIN — SODIUM BICARBONATE 650 MG: 650 TABLET ORAL at 08:18

## 2018-04-30 RX ADMIN — METOPROLOL TARTRATE 50 MG: 50 TABLET, FILM COATED ORAL at 20:56

## 2018-04-30 RX ADMIN — FAMOTIDINE 20 MG: 20 TABLET ORAL at 08:21

## 2018-04-30 ASSESSMENT — PAIN SCALES - GENERAL: PAINLEVEL_OUTOF10: 0

## 2018-05-01 ENCOUNTER — APPOINTMENT (OUTPATIENT)
Dept: ULTRASOUND IMAGING | Age: 83
DRG: 208 | End: 2018-05-01
Payer: MEDICARE

## 2018-05-01 ENCOUNTER — APPOINTMENT (OUTPATIENT)
Dept: GENERAL RADIOLOGY | Age: 83
DRG: 208 | End: 2018-05-01
Payer: MEDICARE

## 2018-05-01 LAB
ANION GAP SERPL CALCULATED.3IONS-SCNC: 12 MEQ/L (ref 8–16)
BUN BLDV-MCNC: 49 MG/DL (ref 7–22)
CALCIUM SERPL-MCNC: 8.6 MG/DL (ref 8.5–10.5)
CHLORIDE BLD-SCNC: 106 MEQ/L (ref 98–111)
CO2: 25 MEQ/L (ref 23–33)
CREAT SERPL-MCNC: 2.6 MG/DL (ref 0.4–1.2)
GFR SERPL CREATININE-BSD FRML MDRD: 24 ML/MIN/1.73M2
GLUCOSE BLD-MCNC: 122 MG/DL (ref 70–108)
GLUCOSE, FLUID: 125 MG/DL
LD, FLUID: 116 U/L
PH FLUID: 7.65
POTASSIUM SERPL-SCNC: 3.6 MEQ/L (ref 3.5–5.2)
PROTEIN FLUID: 1.7 GM/DL
SODIUM BLD-SCNC: 143 MEQ/L (ref 135–145)

## 2018-05-01 PROCEDURE — 6370000000 HC RX 637 (ALT 250 FOR IP): Performed by: INTERNAL MEDICINE

## 2018-05-01 PROCEDURE — 0W9B3ZZ DRAINAGE OF LEFT PLEURAL CAVITY, PERCUTANEOUS APPROACH: ICD-10-PCS | Performed by: RADIOLOGY

## 2018-05-01 PROCEDURE — 87102 FUNGUS ISOLATION CULTURE: CPT

## 2018-05-01 PROCEDURE — 99231 SBSQ HOSP IP/OBS SF/LOW 25: CPT | Performed by: INTERNAL MEDICINE

## 2018-05-01 PROCEDURE — 99233 SBSQ HOSP IP/OBS HIGH 50: CPT | Performed by: INTERNAL MEDICINE

## 2018-05-01 PROCEDURE — 6370000000 HC RX 637 (ALT 250 FOR IP): Performed by: PHYSICIAN ASSISTANT

## 2018-05-01 PROCEDURE — 32555 ASPIRATE PLEURA W/ IMAGING: CPT

## 2018-05-01 PROCEDURE — 84157 ASSAY OF PROTEIN OTHER: CPT

## 2018-05-01 PROCEDURE — 88112 CYTOPATH CELL ENHANCE TECH: CPT

## 2018-05-01 PROCEDURE — 87116 MYCOBACTERIA CULTURE: CPT

## 2018-05-01 PROCEDURE — 82945 GLUCOSE OTHER FLUID: CPT

## 2018-05-01 PROCEDURE — 36592 COLLECT BLOOD FROM PICC: CPT

## 2018-05-01 PROCEDURE — 6360000002 HC RX W HCPCS: Performed by: INTERNAL MEDICINE

## 2018-05-01 PROCEDURE — 71045 X-RAY EXAM CHEST 1 VIEW: CPT

## 2018-05-01 PROCEDURE — 89051 BODY FLUID CELL COUNT: CPT

## 2018-05-01 PROCEDURE — 83615 LACTATE (LD) (LDH) ENZYME: CPT

## 2018-05-01 PROCEDURE — 1200000000 HC SEMI PRIVATE

## 2018-05-01 PROCEDURE — 87075 CULTR BACTERIA EXCEPT BLOOD: CPT

## 2018-05-01 PROCEDURE — 80048 BASIC METABOLIC PNL TOTAL CA: CPT

## 2018-05-01 PROCEDURE — 87070 CULTURE OTHR SPECIMN AEROBIC: CPT

## 2018-05-01 PROCEDURE — 83986 ASSAY PH BODY FLUID NOS: CPT

## 2018-05-01 PROCEDURE — 87205 SMEAR GRAM STAIN: CPT

## 2018-05-01 PROCEDURE — 88305 TISSUE EXAM BY PATHOLOGIST: CPT

## 2018-05-01 RX ORDER — VERAPAMIL HYDROCHLORIDE 120 MG/1
120 TABLET, FILM COATED ORAL DAILY
Status: DISCONTINUED | OUTPATIENT
Start: 2018-05-01 | End: 2018-05-01 | Stop reason: SDUPTHER

## 2018-05-01 RX ORDER — VERAPAMIL HYDROCHLORIDE 240 MG/1
120 TABLET, FILM COATED, EXTENDED RELEASE ORAL NIGHTLY
Status: DISCONTINUED | OUTPATIENT
Start: 2018-05-01 | End: 2018-05-02 | Stop reason: HOSPADM

## 2018-05-01 RX ADMIN — VERAPAMIL HYDROCHLORIDE 120 MG: 240 TABLET, FILM COATED, EXTENDED RELEASE ORAL at 20:29

## 2018-05-01 RX ADMIN — ATORVASTATIN CALCIUM 10 MG: 10 TABLET, FILM COATED ORAL at 20:29

## 2018-05-01 RX ADMIN — CHLORASEPTIC 1 SPRAY: 1.5 LIQUID ORAL at 19:30

## 2018-05-01 RX ADMIN — BISACODYL 10 MG: 10 SUPPOSITORY RECTAL at 21:30

## 2018-05-01 RX ADMIN — DRONABINOL 2.5 MG: 2.5 CAPSULE ORAL at 08:28

## 2018-05-01 RX ADMIN — FAMOTIDINE 20 MG: 20 TABLET ORAL at 08:28

## 2018-05-01 RX ADMIN — SODIUM BICARBONATE 650 MG: 650 TABLET ORAL at 20:29

## 2018-05-01 RX ADMIN — DRONABINOL 2.5 MG: 2.5 CAPSULE ORAL at 20:30

## 2018-05-01 RX ADMIN — CLONIDINE HYDROCHLORIDE 0.1 MG: 0.1 TABLET ORAL at 08:19

## 2018-05-01 RX ADMIN — CLONIDINE HYDROCHLORIDE 0.1 MG: 0.1 TABLET ORAL at 20:29

## 2018-05-01 RX ADMIN — METOPROLOL TARTRATE 50 MG: 50 TABLET, FILM COATED ORAL at 08:17

## 2018-05-01 RX ADMIN — METOPROLOL TARTRATE 50 MG: 50 TABLET, FILM COATED ORAL at 20:29

## 2018-05-01 RX ADMIN — SODIUM BICARBONATE 650 MG: 650 TABLET ORAL at 08:19

## 2018-05-01 RX ADMIN — FUROSEMIDE 40 MG: 40 TABLET ORAL at 08:18

## 2018-05-01 ASSESSMENT — PAIN SCALES - GENERAL
PAINLEVEL_OUTOF10: 0
PAINLEVEL_OUTOF10: 0

## 2018-05-02 ENCOUNTER — APPOINTMENT (OUTPATIENT)
Dept: GENERAL RADIOLOGY | Age: 83
DRG: 208 | End: 2018-05-02
Payer: MEDICARE

## 2018-05-02 VITALS
DIASTOLIC BLOOD PRESSURE: 56 MMHG | WEIGHT: 143.96 LBS | HEIGHT: 70 IN | SYSTOLIC BLOOD PRESSURE: 117 MMHG | HEART RATE: 58 BPM | BODY MASS INDEX: 20.61 KG/M2 | RESPIRATION RATE: 16 BRPM | TEMPERATURE: 97.7 F | OXYGEN SATURATION: 99 %

## 2018-05-02 LAB
ANION GAP SERPL CALCULATED.3IONS-SCNC: 13 MEQ/L (ref 8–16)
BUN BLDV-MCNC: 48 MG/DL (ref 7–22)
CALCIUM SERPL-MCNC: 8.4 MG/DL (ref 8.5–10.5)
CHARACTER, BODY FLUID: CLEAR
CHLORIDE BLD-SCNC: 108 MEQ/L (ref 98–111)
CO2: 24 MEQ/L (ref 23–33)
COLOR: YELLOW
CREAT SERPL-MCNC: 2.7 MG/DL (ref 0.4–1.2)
GFR SERPL CREATININE-BSD FRML MDRD: 23 ML/MIN/1.73M2
GLUCOSE BLD-MCNC: 100 MG/DL (ref 70–108)
LYMPHOCYTES, BODY FLUID: 38 % (ref 25–100)
MONOCYTE, FLUID: 54 % (ref 25–100)
POTASSIUM SERPL-SCNC: 3.6 MEQ/L (ref 3.5–5.2)
RBC FLUID: 331 /CUMM (ref 0–100)
SEGMENTED NEUTROPHILS, BODY FLUID: 8 % (ref 0–25)
SODIUM BLD-SCNC: 145 MEQ/L (ref 135–145)
SPECIMEN: ABNORMAL
URINE CULTURE, ROUTINE: NORMAL
WBC FLUID: 246 /MM3 (ref 0–500)

## 2018-05-02 PROCEDURE — 99232 SBSQ HOSP IP/OBS MODERATE 35: CPT | Performed by: NURSE PRACTITIONER

## 2018-05-02 PROCEDURE — 6370000000 HC RX 637 (ALT 250 FOR IP): Performed by: INTERNAL MEDICINE

## 2018-05-02 PROCEDURE — 6360000002 HC RX W HCPCS: Performed by: INTERNAL MEDICINE

## 2018-05-02 PROCEDURE — 97530 THERAPEUTIC ACTIVITIES: CPT

## 2018-05-02 PROCEDURE — 99239 HOSP IP/OBS DSCHRG MGMT >30: CPT | Performed by: INTERNAL MEDICINE

## 2018-05-02 PROCEDURE — 71045 X-RAY EXAM CHEST 1 VIEW: CPT

## 2018-05-02 PROCEDURE — 97535 SELF CARE MNGMENT TRAINING: CPT

## 2018-05-02 PROCEDURE — 97116 GAIT TRAINING THERAPY: CPT

## 2018-05-02 PROCEDURE — 6370000000 HC RX 637 (ALT 250 FOR IP): Performed by: NURSE PRACTITIONER

## 2018-05-02 PROCEDURE — 36592 COLLECT BLOOD FROM PICC: CPT

## 2018-05-02 PROCEDURE — 80048 BASIC METABOLIC PNL TOTAL CA: CPT

## 2018-05-02 RX ORDER — LACTULOSE 10 G/15ML
20 SOLUTION ORAL 2 TIMES DAILY
Status: DISCONTINUED | OUTPATIENT
Start: 2018-05-02 | End: 2018-05-02 | Stop reason: HOSPADM

## 2018-05-02 RX ORDER — FUROSEMIDE 40 MG/1
40 TABLET ORAL DAILY
Qty: 60 TABLET | Refills: 3 | Status: SHIPPED | OUTPATIENT
Start: 2018-05-03 | End: 2018-05-17 | Stop reason: SINTOL

## 2018-05-02 RX ORDER — BISACODYL 10 MG
10 SUPPOSITORY, RECTAL RECTAL DAILY PRN
Qty: 30 SUPPOSITORY | Refills: 0 | Status: SHIPPED | OUTPATIENT
Start: 2018-05-02 | End: 2018-05-14

## 2018-05-02 RX ORDER — SODIUM BICARBONATE 650 MG/1
650 TABLET ORAL 2 TIMES DAILY
Qty: 60 TABLET | Refills: 1 | Status: SHIPPED | OUTPATIENT
Start: 2018-05-02 | End: 2018-05-17 | Stop reason: ALTCHOICE

## 2018-05-02 RX ORDER — POTASSIUM CHLORIDE 750 MG/1
40 TABLET, FILM COATED, EXTENDED RELEASE ORAL ONCE
Status: COMPLETED | OUTPATIENT
Start: 2018-05-02 | End: 2018-05-02

## 2018-05-02 RX ORDER — CLONIDINE HYDROCHLORIDE 0.1 MG/1
0.1 TABLET ORAL 2 TIMES DAILY
Qty: 60 TABLET | Refills: 3 | Status: SHIPPED | OUTPATIENT
Start: 2018-05-02 | End: 2018-05-17 | Stop reason: SINTOL

## 2018-05-02 RX ORDER — ACETAMINOPHEN 325 MG/1
650 TABLET ORAL EVERY 4 HOURS PRN
Status: DISCONTINUED | OUTPATIENT
Start: 2018-05-02 | End: 2018-05-02 | Stop reason: HOSPADM

## 2018-05-02 RX ADMIN — CHLORASEPTIC 1 SPRAY: 1.5 LIQUID ORAL at 08:25

## 2018-05-02 RX ADMIN — FAMOTIDINE 20 MG: 20 TABLET ORAL at 08:26

## 2018-05-02 RX ADMIN — POTASSIUM CHLORIDE 40 MEQ: 750 TABLET, FILM COATED, EXTENDED RELEASE ORAL at 13:13

## 2018-05-02 RX ADMIN — SODIUM BICARBONATE 650 MG: 650 TABLET ORAL at 08:25

## 2018-05-02 RX ADMIN — CHLORASEPTIC 1 SPRAY: 1.5 LIQUID ORAL at 15:53

## 2018-05-02 RX ADMIN — DRONABINOL 2.5 MG: 2.5 CAPSULE ORAL at 08:26

## 2018-05-02 RX ADMIN — METOPROLOL TARTRATE 50 MG: 50 TABLET, FILM COATED ORAL at 08:26

## 2018-05-02 RX ADMIN — LACTULOSE 20 G: 20 SOLUTION ORAL at 13:13

## 2018-05-02 RX ADMIN — FUROSEMIDE 40 MG: 40 TABLET ORAL at 08:26

## 2018-05-02 RX ADMIN — CLONIDINE HYDROCHLORIDE 0.1 MG: 0.1 TABLET ORAL at 08:26

## 2018-05-03 ENCOUNTER — CARE COORDINATION (OUTPATIENT)
Dept: CASE MANAGEMENT | Age: 83
End: 2018-05-03

## 2018-05-05 LAB
ANAEROBIC CULTURE: NORMAL
BODY FLUID CULTURE, STERILE: NORMAL
GRAM STAIN RESULT: NORMAL

## 2018-05-06 LAB
ANAEROBIC CULTURE: NORMAL
BODY FLUID CULTURE, STERILE: NORMAL
GRAM STAIN RESULT: NORMAL

## 2018-05-07 ENCOUNTER — CARE COORDINATION (OUTPATIENT)
Dept: CASE MANAGEMENT | Age: 83
End: 2018-05-07

## 2018-05-08 ENCOUNTER — OFFICE VISIT (OUTPATIENT)
Dept: FAMILY MEDICINE CLINIC | Age: 83
End: 2018-05-08

## 2018-05-08 VITALS
DIASTOLIC BLOOD PRESSURE: 60 MMHG | BODY MASS INDEX: 21.37 KG/M2 | HEART RATE: 76 BPM | HEIGHT: 70 IN | RESPIRATION RATE: 14 BRPM | WEIGHT: 149.25 LBS | SYSTOLIC BLOOD PRESSURE: 104 MMHG

## 2018-05-08 DIAGNOSIS — I50.31 ACUTE DIASTOLIC CONGESTIVE HEART FAILURE (HCC): ICD-10-CM

## 2018-05-08 DIAGNOSIS — N18.4 ANEMIA IN STAGE 4 CHRONIC KIDNEY DISEASE (HCC): Primary | ICD-10-CM

## 2018-05-08 DIAGNOSIS — R68.89 COLD INTOLERANCE: ICD-10-CM

## 2018-05-08 DIAGNOSIS — D63.1 ANEMIA IN STAGE 4 CHRONIC KIDNEY DISEASE (HCC): Primary | ICD-10-CM

## 2018-05-08 DIAGNOSIS — I48.91 ATRIAL FIBRILLATION WITH RVR (HCC): ICD-10-CM

## 2018-05-08 DIAGNOSIS — I10 ESSENTIAL HYPERTENSION: ICD-10-CM

## 2018-05-08 DIAGNOSIS — E43 SEVERE MALNUTRITION (HCC): ICD-10-CM

## 2018-05-08 DIAGNOSIS — I50.21 ACUTE SYSTOLIC HEART FAILURE (HCC): ICD-10-CM

## 2018-05-08 PROCEDURE — 99214 OFFICE O/P EST MOD 30 MIN: CPT | Performed by: EMERGENCY MEDICINE

## 2018-05-08 ASSESSMENT — ENCOUNTER SYMPTOMS
VOICE CHANGE: 0
SORE THROAT: 0
CHEST TIGHTNESS: 0
CONSTIPATION: 0
TROUBLE SWALLOWING: 0
VOMITING: 0
RHINORRHEA: 0
BACK PAIN: 0
ABDOMINAL PAIN: 0
SHORTNESS OF BREATH: 0
DIARRHEA: 0
SINUS PRESSURE: 0
NAUSEA: 0
WHEEZING: 0
COUGH: 0

## 2018-05-09 ENCOUNTER — CARE COORDINATION (OUTPATIENT)
Dept: CASE MANAGEMENT | Age: 83
End: 2018-05-09

## 2018-05-14 ENCOUNTER — OFFICE VISIT (OUTPATIENT)
Dept: CARDIOLOGY CLINIC | Age: 83
End: 2018-05-14
Payer: MEDICARE

## 2018-05-14 ENCOUNTER — HOSPITAL ENCOUNTER (OUTPATIENT)
Age: 83
Discharge: HOME | End: 2018-05-14
Payer: MEDICARE

## 2018-05-14 VITALS
SYSTOLIC BLOOD PRESSURE: 112 MMHG | DIASTOLIC BLOOD PRESSURE: 54 MMHG | BODY MASS INDEX: 21.68 KG/M2 | HEART RATE: 64 BPM | HEIGHT: 69 IN | WEIGHT: 146.4 LBS

## 2018-05-14 DIAGNOSIS — D63.1 ANEMIA IN STAGE 4 CHRONIC KIDNEY DISEASE (HCC): ICD-10-CM

## 2018-05-14 DIAGNOSIS — N18.4 ANEMIA IN STAGE 4 CHRONIC KIDNEY DISEASE (HCC): ICD-10-CM

## 2018-05-14 DIAGNOSIS — I10 ESSENTIAL HYPERTENSION: ICD-10-CM

## 2018-05-14 DIAGNOSIS — I48.0 PAF (PAROXYSMAL ATRIAL FIBRILLATION) (HCC): ICD-10-CM

## 2018-05-14 DIAGNOSIS — I50.32 CHRONIC DIASTOLIC CONGESTIVE HEART FAILURE (HCC): ICD-10-CM

## 2018-05-14 DIAGNOSIS — I25.119 CORONARY ARTERY DISEASE INVOLVING NATIVE CORONARY ARTERY OF NATIVE HEART WITH ANGINA PECTORIS (HCC): Primary | ICD-10-CM

## 2018-05-14 DIAGNOSIS — I48.91 ATRIAL FIBRILLATION WITH RVR (HCC): ICD-10-CM

## 2018-05-14 LAB
ANION GAP SERPL CALCULATED.3IONS-SCNC: 15 MEQ/L (ref 8–16)
ANISOCYTOSIS: ABNORMAL
BASOPHILS # BLD: 0.7 %
BASOPHILS ABSOLUTE: 0 THOU/MM3 (ref 0–0.1)
BUN BLDV-MCNC: 38 MG/DL (ref 7–22)
CALCIUM SERPL-MCNC: 8.9 MG/DL (ref 8.5–10.5)
CHLORIDE BLD-SCNC: 107 MEQ/L (ref 98–111)
CO2: 24 MEQ/L (ref 23–33)
CREAT SERPL-MCNC: 3.1 MG/DL (ref 0.4–1.2)
EOSINOPHIL # BLD: 1.3 %
EOSINOPHILS ABSOLUTE: 0.1 THOU/MM3 (ref 0–0.4)
GFR SERPL CREATININE-BSD FRML MDRD: 19 ML/MIN/1.73M2
GLUCOSE BLD-MCNC: 100 MG/DL (ref 70–108)
HCT VFR BLD CALC: 30.9 % (ref 42–52)
HEMOGLOBIN: 10.4 GM/DL (ref 14–18)
LYMPHOCYTES # BLD: 26.1 %
LYMPHOCYTES ABSOLUTE: 1.4 THOU/MM3 (ref 1–4.8)
MAGNESIUM: 1.7 MG/DL (ref 1.6–2.4)
MCH RBC QN AUTO: 30.2 PG (ref 27–31)
MCHC RBC AUTO-ENTMCNC: 33.6 GM/DL (ref 33–37)
MCV RBC AUTO: 89.7 FL (ref 80–94)
MONOCYTES # BLD: 7.1 %
MONOCYTES ABSOLUTE: 0.4 THOU/MM3 (ref 0.4–1.3)
NUCLEATED RED BLOOD CELLS: 0 /100 WBC
PDW BLD-RTO: 16.7 % (ref 11.5–14.5)
PLATELET # BLD: 250 THOU/MM3 (ref 130–400)
PMV BLD AUTO: 8.3 FL (ref 7.4–10.4)
POTASSIUM SERPL-SCNC: 3.8 MEQ/L (ref 3.5–5.2)
RBC # BLD: 3.44 MILL/MM3 (ref 4.7–6.1)
SEG NEUTROPHILS: 64.8 %
SEGMENTED NEUTROPHILS ABSOLUTE COUNT: 3.5 THOU/MM3 (ref 1.8–7.7)
SODIUM BLD-SCNC: 146 MEQ/L (ref 135–145)
TSH SERPL DL<=0.05 MIU/L-ACNC: 1.45 UIU/ML (ref 0.4–4.2)
WBC # BLD: 5.4 THOU/MM3 (ref 4.8–10.8)

## 2018-05-14 PROCEDURE — 1036F TOBACCO NON-USER: CPT | Performed by: PHYSICIAN ASSISTANT

## 2018-05-14 PROCEDURE — 36415 COLL VENOUS BLD VENIPUNCTURE: CPT

## 2018-05-14 PROCEDURE — 4040F PNEUMOC VAC/ADMIN/RCVD: CPT | Performed by: PHYSICIAN ASSISTANT

## 2018-05-14 PROCEDURE — 1123F ACP DISCUSS/DSCN MKR DOCD: CPT | Performed by: PHYSICIAN ASSISTANT

## 2018-05-14 PROCEDURE — G8427 DOCREV CUR MEDS BY ELIG CLIN: HCPCS | Performed by: PHYSICIAN ASSISTANT

## 2018-05-14 PROCEDURE — G8598 ASA/ANTIPLAT THER USED: HCPCS | Performed by: PHYSICIAN ASSISTANT

## 2018-05-14 PROCEDURE — 84443 ASSAY THYROID STIM HORMONE: CPT

## 2018-05-14 PROCEDURE — 1111F DSCHRG MED/CURRENT MED MERGE: CPT | Performed by: PHYSICIAN ASSISTANT

## 2018-05-14 PROCEDURE — 80048 BASIC METABOLIC PNL TOTAL CA: CPT

## 2018-05-14 PROCEDURE — 85025 COMPLETE CBC W/AUTO DIFF WBC: CPT

## 2018-05-14 PROCEDURE — 99213 OFFICE O/P EST LOW 20 MIN: CPT | Performed by: PHYSICIAN ASSISTANT

## 2018-05-14 PROCEDURE — 83735 ASSAY OF MAGNESIUM: CPT

## 2018-05-14 PROCEDURE — G8420 CALC BMI NORM PARAMETERS: HCPCS | Performed by: PHYSICIAN ASSISTANT

## 2018-05-15 ENCOUNTER — TELEPHONE (OUTPATIENT)
Dept: FAMILY MEDICINE CLINIC | Age: 83
End: 2018-05-15

## 2018-05-16 ENCOUNTER — CARE COORDINATION (OUTPATIENT)
Dept: CASE MANAGEMENT | Age: 83
End: 2018-05-16

## 2018-05-17 ENCOUNTER — OFFICE VISIT (OUTPATIENT)
Dept: NEPHROLOGY | Age: 83
End: 2018-05-17
Payer: MEDICARE

## 2018-05-17 VITALS
BODY MASS INDEX: 20.9 KG/M2 | WEIGHT: 146 LBS | HEART RATE: 95 BPM | DIASTOLIC BLOOD PRESSURE: 79 MMHG | SYSTOLIC BLOOD PRESSURE: 118 MMHG | OXYGEN SATURATION: 98 % | HEIGHT: 70 IN

## 2018-05-17 DIAGNOSIS — I50.22 CHRONIC SYSTOLIC CONGESTIVE HEART FAILURE (HCC): ICD-10-CM

## 2018-05-17 DIAGNOSIS — I10 ESSENTIAL HYPERTENSION: ICD-10-CM

## 2018-05-17 DIAGNOSIS — N18.3 TYPE 2 DIABETES MELLITUS WITH STAGE 3 CHRONIC KIDNEY DISEASE, UNSPECIFIED LONG TERM INSULIN USE STATUS: ICD-10-CM

## 2018-05-17 DIAGNOSIS — N18.30 CKD (CHRONIC KIDNEY DISEASE) STAGE 3, GFR 30-59 ML/MIN (HCC): ICD-10-CM

## 2018-05-17 DIAGNOSIS — D50.8 IRON DEFICIENCY ANEMIA DUE TO DIETARY CAUSES: ICD-10-CM

## 2018-05-17 DIAGNOSIS — N25.81 HYPERPARATHYROIDISM, SECONDARY RENAL (HCC): ICD-10-CM

## 2018-05-17 DIAGNOSIS — E55.9 VITAMIN D DEFICIENCY: ICD-10-CM

## 2018-05-17 DIAGNOSIS — E11.22 TYPE 2 DIABETES MELLITUS WITH STAGE 3 CHRONIC KIDNEY DISEASE, UNSPECIFIED LONG TERM INSULIN USE STATUS: ICD-10-CM

## 2018-05-17 DIAGNOSIS — N17.9 AKI (ACUTE KIDNEY INJURY) (HCC): Primary | ICD-10-CM

## 2018-05-17 PROCEDURE — G8598 ASA/ANTIPLAT THER USED: HCPCS | Performed by: NURSE PRACTITIONER

## 2018-05-17 PROCEDURE — 1036F TOBACCO NON-USER: CPT | Performed by: NURSE PRACTITIONER

## 2018-05-17 PROCEDURE — G8420 CALC BMI NORM PARAMETERS: HCPCS | Performed by: NURSE PRACTITIONER

## 2018-05-17 PROCEDURE — 1111F DSCHRG MED/CURRENT MED MERGE: CPT | Performed by: NURSE PRACTITIONER

## 2018-05-17 PROCEDURE — G8427 DOCREV CUR MEDS BY ELIG CLIN: HCPCS | Performed by: NURSE PRACTITIONER

## 2018-05-17 PROCEDURE — 1123F ACP DISCUSS/DSCN MKR DOCD: CPT | Performed by: NURSE PRACTITIONER

## 2018-05-17 PROCEDURE — 99213 OFFICE O/P EST LOW 20 MIN: CPT | Performed by: NURSE PRACTITIONER

## 2018-05-17 PROCEDURE — 4040F PNEUMOC VAC/ADMIN/RCVD: CPT | Performed by: NURSE PRACTITIONER

## 2018-05-17 RX ORDER — METOPROLOL SUCCINATE 100 MG/1
50 TABLET, EXTENDED RELEASE ORAL 2 TIMES DAILY
Status: ON HOLD | COMMUNITY
End: 2018-05-31

## 2018-05-25 ENCOUNTER — TELEPHONE (OUTPATIENT)
Dept: CARDIOLOGY CLINIC | Age: 83
End: 2018-05-25

## 2018-05-29 ENCOUNTER — OFFICE VISIT (OUTPATIENT)
Dept: CARDIOLOGY CLINIC | Age: 83
End: 2018-05-29
Payer: MEDICARE

## 2018-05-29 VITALS
DIASTOLIC BLOOD PRESSURE: 92 MMHG | SYSTOLIC BLOOD PRESSURE: 144 MMHG | BODY MASS INDEX: 21.48 KG/M2 | WEIGHT: 145 LBS | HEIGHT: 69 IN | HEART RATE: 145 BPM

## 2018-05-29 DIAGNOSIS — I48.3 TYPICAL ATRIAL FLUTTER (HCC): ICD-10-CM

## 2018-05-29 DIAGNOSIS — I10 ESSENTIAL HYPERTENSION: ICD-10-CM

## 2018-05-29 DIAGNOSIS — R00.0 TACHYCARDIA: Primary | ICD-10-CM

## 2018-05-29 PROCEDURE — G8420 CALC BMI NORM PARAMETERS: HCPCS | Performed by: NUCLEAR MEDICINE

## 2018-05-29 PROCEDURE — 1036F TOBACCO NON-USER: CPT | Performed by: NUCLEAR MEDICINE

## 2018-05-29 PROCEDURE — 1123F ACP DISCUSS/DSCN MKR DOCD: CPT | Performed by: NUCLEAR MEDICINE

## 2018-05-29 PROCEDURE — 99214 OFFICE O/P EST MOD 30 MIN: CPT | Performed by: NUCLEAR MEDICINE

## 2018-05-29 PROCEDURE — 1111F DSCHRG MED/CURRENT MED MERGE: CPT | Performed by: NUCLEAR MEDICINE

## 2018-05-29 PROCEDURE — 93000 ELECTROCARDIOGRAM COMPLETE: CPT | Performed by: NUCLEAR MEDICINE

## 2018-05-29 PROCEDURE — G8427 DOCREV CUR MEDS BY ELIG CLIN: HCPCS | Performed by: NUCLEAR MEDICINE

## 2018-05-29 PROCEDURE — 4040F PNEUMOC VAC/ADMIN/RCVD: CPT | Performed by: NUCLEAR MEDICINE

## 2018-05-29 PROCEDURE — G8598 ASA/ANTIPLAT THER USED: HCPCS | Performed by: NUCLEAR MEDICINE

## 2018-05-29 RX ORDER — FERROUS SULFATE 325(65) MG
325 TABLET ORAL
COMMUNITY

## 2018-05-31 ENCOUNTER — HOSPITAL ENCOUNTER (INPATIENT)
Age: 83
LOS: 1 days | Discharge: HOME | DRG: 310 | End: 2018-05-31
Attending: NUCLEAR MEDICINE | Admitting: NUCLEAR MEDICINE
Payer: MEDICARE

## 2018-05-31 ENCOUNTER — OFFICE VISIT (OUTPATIENT)
Dept: CARDIOLOGY CLINIC | Age: 83
End: 2018-05-31
Payer: MEDICARE

## 2018-05-31 VITALS
TEMPERATURE: 97.5 F | WEIGHT: 139.33 LBS | HEIGHT: 70 IN | HEART RATE: 88 BPM | DIASTOLIC BLOOD PRESSURE: 95 MMHG | BODY MASS INDEX: 19.95 KG/M2 | RESPIRATION RATE: 18 BRPM | SYSTOLIC BLOOD PRESSURE: 161 MMHG | OXYGEN SATURATION: 97 %

## 2018-05-31 VITALS
WEIGHT: 144 LBS | DIASTOLIC BLOOD PRESSURE: 88 MMHG | SYSTOLIC BLOOD PRESSURE: 138 MMHG | HEART RATE: 143 BPM | BODY MASS INDEX: 21.27 KG/M2

## 2018-05-31 DIAGNOSIS — I48.20 CHRONIC ATRIAL FIBRILLATION (HCC): ICD-10-CM

## 2018-05-31 DIAGNOSIS — I25.119 CORONARY ARTERY DISEASE INVOLVING NATIVE CORONARY ARTERY OF NATIVE HEART WITH ANGINA PECTORIS (HCC): Primary | ICD-10-CM

## 2018-05-31 PROBLEM — I48.91 A-FIB (HCC): Status: ACTIVE | Noted: 2018-05-31

## 2018-05-31 LAB
ALBUMIN SERPL-MCNC: 3.8 G/DL (ref 3.5–5.1)
ALP BLD-CCNC: 139 U/L (ref 38–126)
ALT SERPL-CCNC: 10 U/L (ref 11–66)
ANION GAP SERPL CALCULATED.3IONS-SCNC: 15 MEQ/L (ref 8–16)
AST SERPL-CCNC: 16 U/L (ref 5–40)
BILIRUB SERPL-MCNC: 0.5 MG/DL (ref 0.3–1.2)
BUN BLDV-MCNC: 35 MG/DL (ref 7–22)
CALCIUM SERPL-MCNC: 8.7 MG/DL (ref 8.5–10.5)
CHLORIDE BLD-SCNC: 106 MEQ/L (ref 98–111)
CO2: 20 MEQ/L (ref 23–33)
CREAT SERPL-MCNC: 2.9 MG/DL (ref 0.4–1.2)
EKG ATRIAL RATE: 101 BPM
EKG P AXIS: 77 DEGREES
EKG P-R INTERVAL: 190 MS
EKG Q-T INTERVAL: 358 MS
EKG QRS DURATION: 84 MS
EKG QTC CALCULATION (BAZETT): 464 MS
EKG R AXIS: -60 DEGREES
EKG T AXIS: 11 DEGREES
EKG VENTRICULAR RATE: 101 BPM
GFR SERPL CREATININE-BSD FRML MDRD: 21 ML/MIN/1.73M2
GLUCOSE BLD-MCNC: 126 MG/DL (ref 70–108)
HCT VFR BLD CALC: 31.4 % (ref 42–52)
HEMOGLOBIN: 10.6 GM/DL (ref 14–18)
LV EF: 45 %
LVEF MODALITY: NORMAL
MCH RBC QN AUTO: 30.4 PG (ref 27–31)
MCHC RBC AUTO-ENTMCNC: 33.6 GM/DL (ref 33–37)
MCV RBC AUTO: 90.4 FL (ref 80–94)
MRSA SCREEN RT-PCR: NEGATIVE
PDW BLD-RTO: 17 % (ref 11.5–14.5)
PLATELET # BLD: 161 THOU/MM3 (ref 130–400)
PMV BLD AUTO: 8.3 FL (ref 7.4–10.4)
POTASSIUM SERPL-SCNC: 4.5 MEQ/L (ref 3.5–5.2)
RBC # BLD: 3.47 MILL/MM3 (ref 4.7–6.1)
SODIUM BLD-SCNC: 141 MEQ/L (ref 135–145)
TOTAL PROTEIN: 6.1 G/DL (ref 6.1–8)
WBC # BLD: 3.9 THOU/MM3 (ref 4.8–10.8)

## 2018-05-31 PROCEDURE — 93312 ECHO TRANSESOPHAGEAL: CPT | Performed by: NUCLEAR MEDICINE

## 2018-05-31 PROCEDURE — B246ZZ4 ULTRASONOGRAPHY OF RIGHT AND LEFT HEART, TRANSESOPHAGEAL: ICD-10-PCS | Performed by: NUCLEAR MEDICINE

## 2018-05-31 PROCEDURE — 92960 CARDIOVERSION ELECTRIC EXT: CPT | Performed by: NUCLEAR MEDICINE

## 2018-05-31 PROCEDURE — 93312 ECHO TRANSESOPHAGEAL: CPT

## 2018-05-31 PROCEDURE — 80053 COMPREHEN METABOLIC PANEL: CPT

## 2018-05-31 PROCEDURE — 2500000003 HC RX 250 WO HCPCS

## 2018-05-31 PROCEDURE — 2000000000 HC ICU R&B

## 2018-05-31 PROCEDURE — 6360000002 HC RX W HCPCS

## 2018-05-31 PROCEDURE — 87641 MR-STAPH DNA AMP PROBE: CPT

## 2018-05-31 PROCEDURE — 2500000003 HC RX 250 WO HCPCS: Performed by: NUCLEAR MEDICINE

## 2018-05-31 PROCEDURE — 36415 COLL VENOUS BLD VENIPUNCTURE: CPT

## 2018-05-31 PROCEDURE — 93320 DOPPLER ECHO COMPLETE: CPT

## 2018-05-31 PROCEDURE — 85027 COMPLETE CBC AUTOMATED: CPT

## 2018-05-31 PROCEDURE — 93325 DOPPLER ECHO COLOR FLOW MAPG: CPT

## 2018-05-31 PROCEDURE — 99214 OFFICE O/P EST MOD 30 MIN: CPT | Performed by: NUCLEAR MEDICINE

## 2018-05-31 PROCEDURE — 87081 CULTURE SCREEN ONLY: CPT

## 2018-05-31 PROCEDURE — 5A2204Z RESTORATION OF CARDIAC RHYTHM, SINGLE: ICD-10-PCS | Performed by: NUCLEAR MEDICINE

## 2018-05-31 PROCEDURE — 6370000000 HC RX 637 (ALT 250 FOR IP)

## 2018-05-31 PROCEDURE — 93005 ELECTROCARDIOGRAM TRACING: CPT | Performed by: NUCLEAR MEDICINE

## 2018-05-31 RX ORDER — METOPROLOL TARTRATE 5 MG/5ML
5 INJECTION INTRAVENOUS ONCE
Status: COMPLETED | OUTPATIENT
Start: 2018-05-31 | End: 2018-05-31

## 2018-05-31 RX ORDER — METOPROLOL TARTRATE 5 MG/5ML
INJECTION INTRAVENOUS
Status: COMPLETED
Start: 2018-05-31 | End: 2018-05-31

## 2018-05-31 RX ORDER — METOPROLOL SUCCINATE 100 MG/1
100 TABLET, EXTENDED RELEASE ORAL EVERY MORNING
Qty: 30 TABLET | Refills: 3 | Status: SHIPPED | OUTPATIENT
Start: 2018-05-31 | End: 2019-01-01 | Stop reason: SDUPTHER

## 2018-05-31 RX ORDER — METOPROLOL SUCCINATE 100 MG/1
50 TABLET, EXTENDED RELEASE ORAL NIGHTLY
Qty: 30 TABLET | Refills: 3 | Status: SHIPPED | OUTPATIENT
Start: 2018-05-31 | End: 2018-07-19 | Stop reason: DRUGHIGH

## 2018-05-31 RX ORDER — FLUMAZENIL 0.1 MG/ML
INJECTION, SOLUTION INTRAVENOUS
Status: COMPLETED
Start: 2018-05-31 | End: 2018-05-31

## 2018-05-31 RX ORDER — MIDAZOLAM HYDROCHLORIDE 1 MG/ML
INJECTION INTRAMUSCULAR; INTRAVENOUS
Status: DISCONTINUED
Start: 2018-05-31 | End: 2018-05-31 | Stop reason: HOSPADM

## 2018-05-31 RX ORDER — FENTANYL CITRATE 50 UG/ML
INJECTION, SOLUTION INTRAMUSCULAR; INTRAVENOUS
Status: DISCONTINUED
Start: 2018-05-31 | End: 2018-05-31 | Stop reason: HOSPADM

## 2018-05-31 RX ORDER — METOPROLOL TARTRATE 5 MG/5ML
INJECTION INTRAVENOUS
Status: DISCONTINUED
Start: 2018-05-31 | End: 2018-05-31 | Stop reason: HOSPADM

## 2018-05-31 RX ORDER — FLUMAZENIL 0.1 MG/ML
INJECTION, SOLUTION INTRAVENOUS
Status: DISCONTINUED
Start: 2018-05-31 | End: 2018-05-31 | Stop reason: HOSPADM

## 2018-05-31 RX ORDER — NALOXONE HYDROCHLORIDE 1 MG/ML
INJECTION INTRAMUSCULAR; INTRAVENOUS; SUBCUTANEOUS
Status: COMPLETED
Start: 2018-05-31 | End: 2018-05-31

## 2018-05-31 RX ADMIN — METOPROLOL TARTRATE 5 MG: 5 INJECTION, SOLUTION INTRAVENOUS at 15:31

## 2018-05-31 RX ADMIN — FLUMAZENIL 0.2 MG: 0.1 INJECTION, SOLUTION INTRAVENOUS at 15:29

## 2018-05-31 RX ADMIN — METOPROLOL TARTRATE 5 MG: 5 INJECTION, SOLUTION INTRAVENOUS at 19:00

## 2018-05-31 RX ADMIN — NALOXONE HYDROCHLORIDE 0.4 MG: 1 INJECTION PARENTERAL at 15:31

## 2018-05-31 RX ADMIN — METOPROLOL TARTRATE 5 MG: 5 INJECTION INTRAVENOUS at 19:00

## 2018-05-31 ASSESSMENT — PAIN SCALES - GENERAL: PAINLEVEL_OUTOF10: 0

## 2018-06-01 ENCOUNTER — CARE COORDINATION (OUTPATIENT)
Dept: CASE MANAGEMENT | Age: 83
End: 2018-06-01

## 2018-06-01 DIAGNOSIS — I48.91 ATRIAL FIBRILLATION, UNSPECIFIED TYPE (HCC): Primary | ICD-10-CM

## 2018-06-02 LAB
MRSA SCREEN: NORMAL
VRE CULTURE: NORMAL

## 2018-06-04 ENCOUNTER — CARE COORDINATION (OUTPATIENT)
Dept: CASE MANAGEMENT | Age: 83
End: 2018-06-04

## 2018-06-04 LAB
FUNGUS IDENTIFIED: NORMAL
FUNGUS SMEAR: NORMAL

## 2018-06-07 ENCOUNTER — CARE COORDINATION (OUTPATIENT)
Dept: CASE MANAGEMENT | Age: 83
End: 2018-06-07

## 2018-06-14 ENCOUNTER — OFFICE VISIT (OUTPATIENT)
Dept: FAMILY MEDICINE CLINIC | Age: 83
End: 2018-06-14

## 2018-06-14 VITALS — HEART RATE: 64 BPM | DIASTOLIC BLOOD PRESSURE: 80 MMHG | SYSTOLIC BLOOD PRESSURE: 160 MMHG | HEIGHT: 70 IN

## 2018-06-14 DIAGNOSIS — J44.1 COPD EXACERBATION (HCC): Primary | ICD-10-CM

## 2018-06-14 DIAGNOSIS — D63.1 ANEMIA IN STAGE 4 CHRONIC KIDNEY DISEASE (HCC): ICD-10-CM

## 2018-06-14 DIAGNOSIS — N18.4 ANEMIA IN STAGE 4 CHRONIC KIDNEY DISEASE (HCC): ICD-10-CM

## 2018-06-14 PROCEDURE — 1111F DSCHRG MED/CURRENT MED MERGE: CPT | Performed by: EMERGENCY MEDICINE

## 2018-06-14 PROCEDURE — 99213 OFFICE O/P EST LOW 20 MIN: CPT | Performed by: EMERGENCY MEDICINE

## 2018-06-14 RX ORDER — LEVOFLOXACIN 250 MG/1
250 TABLET ORAL DAILY
Qty: 10 TABLET | Refills: 0 | Status: SHIPPED | OUTPATIENT
Start: 2018-06-14 | End: 2018-06-24

## 2018-06-14 ASSESSMENT — ENCOUNTER SYMPTOMS
COUGH: 1
CONSTIPATION: 0
BACK PAIN: 0
TROUBLE SWALLOWING: 0
VOMITING: 0
WHEEZING: 0
SORE THROAT: 0
RHINORRHEA: 0
SINUS PRESSURE: 0
CHEST TIGHTNESS: 0
DIARRHEA: 0
ABDOMINAL PAIN: 0
SHORTNESS OF BREATH: 0
NAUSEA: 0
VOICE CHANGE: 0

## 2018-06-15 ENCOUNTER — CARE COORDINATION (OUTPATIENT)
Dept: CASE MANAGEMENT | Age: 83
End: 2018-06-15

## 2018-06-18 ENCOUNTER — CARE COORDINATION (OUTPATIENT)
Dept: CASE MANAGEMENT | Age: 83
End: 2018-06-18

## 2018-06-18 LAB — AFB CULTURE & SMEAR: NORMAL

## 2018-07-10 ENCOUNTER — OFFICE VISIT (OUTPATIENT)
Dept: FAMILY MEDICINE CLINIC | Age: 83
End: 2018-07-10

## 2018-07-10 VITALS
SYSTOLIC BLOOD PRESSURE: 126 MMHG | DIASTOLIC BLOOD PRESSURE: 64 MMHG | HEART RATE: 60 BPM | RESPIRATION RATE: 14 BRPM | BODY MASS INDEX: 20.93 KG/M2 | HEIGHT: 70 IN | WEIGHT: 146.2 LBS

## 2018-07-10 DIAGNOSIS — E78.5 HYPERLIPIDEMIA, UNSPECIFIED HYPERLIPIDEMIA TYPE: ICD-10-CM

## 2018-07-10 DIAGNOSIS — N18.4 DIABETES MELLITUS DUE TO UNDERLYING CONDITION WITH STAGE 4 CHRONIC KIDNEY DISEASE, UNSPECIFIED LONG TERM INSULIN USE STATUS: Primary | ICD-10-CM

## 2018-07-10 DIAGNOSIS — I10 ESSENTIAL HYPERTENSION: ICD-10-CM

## 2018-07-10 DIAGNOSIS — E08.22 DIABETES MELLITUS DUE TO UNDERLYING CONDITION WITH STAGE 4 CHRONIC KIDNEY DISEASE, UNSPECIFIED LONG TERM INSULIN USE STATUS: Primary | ICD-10-CM

## 2018-07-10 LAB
GLUCOSE BLD-MCNC: 80 MG/DL
HBA1C MFR BLD: 5.5 %

## 2018-07-10 PROCEDURE — 99213 OFFICE O/P EST LOW 20 MIN: CPT | Performed by: EMERGENCY MEDICINE

## 2018-07-10 PROCEDURE — 83036 HEMOGLOBIN GLYCOSYLATED A1C: CPT | Performed by: EMERGENCY MEDICINE

## 2018-07-10 PROCEDURE — 82962 GLUCOSE BLOOD TEST: CPT | Performed by: EMERGENCY MEDICINE

## 2018-07-10 RX ORDER — GLIMEPIRIDE 1 MG/1
1 TABLET ORAL
Qty: 90 TABLET | Refills: 1 | Status: SHIPPED | OUTPATIENT
Start: 2018-07-10 | End: 2019-01-01 | Stop reason: SDUPTHER

## 2018-07-10 ASSESSMENT — PATIENT HEALTH QUESTIONNAIRE - PHQ9
SUM OF ALL RESPONSES TO PHQ QUESTIONS 1-9: 0
1. LITTLE INTEREST OR PLEASURE IN DOING THINGS: 0
2. FEELING DOWN, DEPRESSED OR HOPELESS: 0
SUM OF ALL RESPONSES TO PHQ9 QUESTIONS 1 & 2: 0

## 2018-07-10 ASSESSMENT — ENCOUNTER SYMPTOMS
RHINORRHEA: 0
DIARRHEA: 0
SINUS PRESSURE: 0
CHEST TIGHTNESS: 0
SORE THROAT: 0
BACK PAIN: 0
CONSTIPATION: 0
COUGH: 0
TROUBLE SWALLOWING: 0
ABDOMINAL PAIN: 0
NAUSEA: 0
VOICE CHANGE: 0
VOMITING: 0
WHEEZING: 0
SHORTNESS OF BREATH: 0

## 2018-07-10 NOTE — PROGRESS NOTES
 albuterol-ipratropium (COMBIVENT RESPIMAT)  MCG/ACT AERS inhaler Inhale 1 puff into the lungs every 6 hours (Patient taking differently: Inhale 1 puff into the lungs daily as needed ) 1 Inhaler 1    apixaban (ELIQUIS) 2.5 MG TABS tablet Take 1 tablet by mouth 2 times daily 60 tablet 2    verapamil (CALAN SR) 120 MG extended release tablet Take 1 tablet by mouth nightly 30 tablet 3    clopidogrel (PLAVIX) 75 MG tablet Take 1 tablet by mouth daily 30 tablet 3    pantoprazole (PROTONIX) 40 MG tablet Take 1 tablet by mouth every morning (before breakfast) 30 tablet 3    atorvastatin (LIPITOR) 10 MG tablet TAKE 1 TABLET BY MOUTH EVERY DAY 90 tablet 1    ONETOUCH DELICA LANCETS 65Y MISC Check blood sugar once daily. Dx: 250.00 100 each 1    glucose blood VI test strips (ONETOUCH VERIO) strip Check blood sugar once daily Dx: 250.00 100 each 3    Cholecalciferol (VITAMIN D PO) Take 2,000 Units by mouth daily       vitamin E 400 UNIT capsule Take 1 capsule by mouth daily. 90 capsule 3     No current facility-administered medications for this visit. Subjective:      Review of Systems   Constitutional: Negative for appetite change, chills, diaphoresis, fatigue and fever. HENT: Negative for congestion, ear discharge, ear pain, postnasal drip, rhinorrhea, sinus pressure, sore throat, trouble swallowing and voice change. Respiratory: Negative for cough, chest tightness, shortness of breath and wheezing. Cardiovascular: Negative for chest pain, palpitations and leg swelling. Gastrointestinal: Negative for abdominal pain, constipation, diarrhea, nausea and vomiting. Musculoskeletal: Negative for arthralgias, back pain, joint swelling, myalgias, neck pain and neck stiffness. Skin: Negative for rash. Neurological: Negative for dizziness, syncope, weakness, light-headedness, numbness and headaches.        Objective:     /64 (Site: Right Arm, Position: Sitting, Cuff Size: Medium Adult)

## 2018-07-17 ENCOUNTER — HOSPITAL ENCOUNTER (OUTPATIENT)
Age: 83
Discharge: HOME | End: 2018-07-17
Payer: MEDICARE

## 2018-07-17 DIAGNOSIS — N25.81 HYPERPARATHYROIDISM, SECONDARY RENAL (HCC): ICD-10-CM

## 2018-07-17 DIAGNOSIS — N18.4 CKD (CHRONIC KIDNEY DISEASE) STAGE 4, GFR 15-29 ML/MIN (HCC): ICD-10-CM

## 2018-07-17 LAB
ANION GAP SERPL CALCULATED.3IONS-SCNC: 14 MEQ/L (ref 8–16)
BUN BLDV-MCNC: 32 MG/DL (ref 7–22)
CALCIUM SERPL-MCNC: 8.9 MG/DL (ref 8.5–10.5)
CHLORIDE BLD-SCNC: 110 MEQ/L (ref 98–111)
CO2: 20 MEQ/L (ref 23–33)
CREAT SERPL-MCNC: 2.8 MG/DL (ref 0.4–1.2)
GFR SERPL CREATININE-BSD FRML MDRD: 22 ML/MIN/1.73M2
GLUCOSE BLD-MCNC: 69 MG/DL (ref 70–108)
PHOSPHORUS: 3.8 MG/DL (ref 2.4–4.7)
POTASSIUM SERPL-SCNC: 4.6 MEQ/L (ref 3.5–5.2)
PTH INTACT: 150.4 PG/ML (ref 15–65)
SODIUM BLD-SCNC: 144 MEQ/L (ref 135–145)
VITAMIN D 25-HYDROXY: 40 NG/ML (ref 30–100)

## 2018-07-17 PROCEDURE — 82306 VITAMIN D 25 HYDROXY: CPT

## 2018-07-17 PROCEDURE — 84100 ASSAY OF PHOSPHORUS: CPT

## 2018-07-17 PROCEDURE — 80048 BASIC METABOLIC PNL TOTAL CA: CPT

## 2018-07-17 PROCEDURE — 36415 COLL VENOUS BLD VENIPUNCTURE: CPT

## 2018-07-17 PROCEDURE — 83970 ASSAY OF PARATHORMONE: CPT

## 2018-07-19 ENCOUNTER — OFFICE VISIT (OUTPATIENT)
Dept: NEPHROLOGY | Age: 83
End: 2018-07-19
Payer: MEDICARE

## 2018-07-19 VITALS — BODY MASS INDEX: 21.41 KG/M2 | DIASTOLIC BLOOD PRESSURE: 70 MMHG | SYSTOLIC BLOOD PRESSURE: 162 MMHG | WEIGHT: 149.2 LBS

## 2018-07-19 DIAGNOSIS — E08.22 DIABETES MELLITUS DUE TO UNDERLYING CONDITION WITH STAGE 4 CHRONIC KIDNEY DISEASE, UNSPECIFIED LONG TERM INSULIN USE STATUS: ICD-10-CM

## 2018-07-19 DIAGNOSIS — D63.1 ANEMIA IN STAGE 4 CHRONIC KIDNEY DISEASE (HCC): Primary | ICD-10-CM

## 2018-07-19 DIAGNOSIS — N18.4 ANEMIA IN STAGE 4 CHRONIC KIDNEY DISEASE (HCC): Primary | ICD-10-CM

## 2018-07-19 DIAGNOSIS — N18.4 CKD (CHRONIC KIDNEY DISEASE), STAGE IV (HCC): ICD-10-CM

## 2018-07-19 DIAGNOSIS — N18.4 DIABETES MELLITUS DUE TO UNDERLYING CONDITION WITH STAGE 4 CHRONIC KIDNEY DISEASE, UNSPECIFIED LONG TERM INSULIN USE STATUS: ICD-10-CM

## 2018-07-19 DIAGNOSIS — N25.81 HYPERPARATHYROIDISM, SECONDARY RENAL (HCC): ICD-10-CM

## 2018-07-19 DIAGNOSIS — I10 ESSENTIAL HYPERTENSION: ICD-10-CM

## 2018-07-19 PROBLEM — Z83.49: Status: ACTIVE | Noted: 2018-07-19

## 2018-07-19 PROCEDURE — 4040F PNEUMOC VAC/ADMIN/RCVD: CPT | Performed by: INTERNAL MEDICINE

## 2018-07-19 PROCEDURE — 1123F ACP DISCUSS/DSCN MKR DOCD: CPT | Performed by: INTERNAL MEDICINE

## 2018-07-19 PROCEDURE — G8598 ASA/ANTIPLAT THER USED: HCPCS | Performed by: INTERNAL MEDICINE

## 2018-07-19 PROCEDURE — G8420 CALC BMI NORM PARAMETERS: HCPCS | Performed by: INTERNAL MEDICINE

## 2018-07-19 PROCEDURE — 1036F TOBACCO NON-USER: CPT | Performed by: INTERNAL MEDICINE

## 2018-07-19 PROCEDURE — G8427 DOCREV CUR MEDS BY ELIG CLIN: HCPCS | Performed by: INTERNAL MEDICINE

## 2018-07-19 PROCEDURE — 99214 OFFICE O/P EST MOD 30 MIN: CPT | Performed by: INTERNAL MEDICINE

## 2018-07-19 PROCEDURE — 1101F PT FALLS ASSESS-DOCD LE1/YR: CPT | Performed by: INTERNAL MEDICINE

## 2018-07-19 RX ORDER — CLONIDINE HYDROCHLORIDE 0.1 MG/1
0.1 TABLET ORAL 2 TIMES DAILY
COMMUNITY
End: 2018-01-01 | Stop reason: SDUPTHER

## 2018-08-22 ENCOUNTER — TELEPHONE (OUTPATIENT)
Dept: FAMILY MEDICINE CLINIC | Age: 83
End: 2018-08-22

## 2018-08-29 NOTE — TELEPHONE ENCOUNTER
Greta Savage called for a refill of:    clopidogrel (PLAVIX) 75 MG tablet qd  pantoprazole (PROTONIX) 40 MG tablet qd    Send 30 day to [facility]    Pt is completely out and needing to  today.

## 2018-08-29 NOTE — TELEPHONE ENCOUNTER
Date of last visit:  7/10/2018  Date of next visit:  10/16/2018    Requested Prescriptions      No prescriptions requested or ordered in this encounter

## 2018-08-30 RX ORDER — PANTOPRAZOLE SODIUM 40 MG/1
40 TABLET, DELAYED RELEASE ORAL
Qty: 90 TABLET | Refills: 1 | Status: SHIPPED | OUTPATIENT
Start: 2018-08-30 | End: 2018-01-01 | Stop reason: ALTCHOICE

## 2018-08-30 RX ORDER — CLOPIDOGREL BISULFATE 75 MG/1
75 TABLET ORAL DAILY
Qty: 90 TABLET | Refills: 1 | Status: SHIPPED | OUTPATIENT
Start: 2018-08-30 | End: 2019-01-01 | Stop reason: SDUPTHER

## 2018-10-16 PROBLEM — N17.9 AKI (ACUTE KIDNEY INJURY) (HCC): Status: RESOLVED | Noted: 2018-04-10 | Resolved: 2018-01-01

## 2018-10-16 PROBLEM — J18.9 PNEUMONIA OF BOTH LOWER LOBES DUE TO INFECTIOUS ORGANISM: Status: RESOLVED | Noted: 2018-04-13 | Resolved: 2018-01-01

## 2018-10-16 NOTE — PROGRESS NOTES
Visit Date: 10/16/2018    Henrique Estevez is a 80 y.o. male who presents today for:  Chief Complaint   Patient presents with    Diabetes         HPI:     Here for a follow-up     doing well, No SOB, No chest pain , No fatigue. No nausea nor abdominal pain    Was told by his nephrologist to stop Pepcid      Diabetes   He presents for his follow-up diabetic visit. He has type 2 diabetes mellitus. His disease course has been stable. Pertinent negatives for hypoglycemia include no dizziness, headaches, sleepiness or sweats. Pertinent negatives for diabetes include no chest pain, no fatigue and no weakness. Symptoms are stable. Risk factors for coronary artery disease include diabetes mellitus, dyslipidemia, hypertension and male sex. Current diabetic treatment includes oral agent (monotherapy) (patient is not compliant with dieting, she eats whatever she wants). He is following a diabetic and generally healthy diet. His breakfast blood glucose range is generally 140-180 mg/dl. Hypertension   Pertinent negatives include no chest pain, headaches, neck pain, palpitations, shortness of breath or sweats. Coronary Artery Disease   Pertinent negatives include no chest pain, chest tightness, dizziness, leg swelling, palpitations or shortness of breath. Risk factors include hyperlipidemia. Hyperlipidemia   This is a chronic problem. The problem is controlled. Pertinent negatives include no chest pain, myalgias or shortness of breath.          Current Medications:  Current Outpatient Prescriptions   Medication Sig Dispense Refill    famotidine (PEPCID) 20 MG tablet Take 20 mg by mouth 2 times daily      apixaban (ELIQUIS) 2.5 MG TABS tablet Take 1 tablet by mouth 2 times daily 60 tablet 2    clopidogrel (PLAVIX) 75 MG tablet Take 1 tablet by mouth daily 90 tablet 1    verapamil (CALAN SR) 120 MG extended release tablet TAKE 1 TABLET BY MOUTH EVERY NIGHT 90 tablet 1    cloNIDine (CATAPRES) 0.1 MG tablet Take 0.1 mg by mouth 2 times daily      glimepiride (AMARYL) 1 MG tablet Take 1 tablet by mouth every morning (before breakfast) 90 tablet 1    metoprolol succinate (TOPROL XL) 100 MG extended release tablet Take 1 tablet by mouth every morning 30 tablet 3    ferrous sulfate 325 (65 Fe) MG tablet Take 325 mg by mouth daily (with breakfast)      albuterol-ipratropium (COMBIVENT RESPIMAT)  MCG/ACT AERS inhaler Inhale 1 puff into the lungs every 6 hours (Patient taking differently: Inhale 1 puff into the lungs daily as needed ) 1 Inhaler 1    atorvastatin (LIPITOR) 10 MG tablet TAKE 1 TABLET BY MOUTH EVERY DAY 90 tablet 1    ONETOUCH DELICA LANCETS 16F MISC Check blood sugar once daily. Dx: 250.00 100 each 1    glucose blood VI test strips (ONETOUCH VERIO) strip Check blood sugar once daily Dx: 250.00 100 each 3    Cholecalciferol (VITAMIN D PO) Take 2,000 Units by mouth daily       vitamin E 400 UNIT capsule Take 1 capsule by mouth daily. 90 capsule 3     No current facility-administered medications for this visit. Subjective:     Review of Systems   Constitutional: Negative for appetite change, chills, diaphoresis, fatigue and fever. HENT: Negative for congestion, ear discharge, ear pain, postnasal drip, rhinorrhea, sinus pressure, sore throat, trouble swallowing and voice change. Respiratory: Negative for cough, chest tightness, shortness of breath and wheezing. Cardiovascular: Negative for chest pain, palpitations and leg swelling. Gastrointestinal: Negative for abdominal pain, constipation, diarrhea, nausea and vomiting. Musculoskeletal: Negative for arthralgias, back pain, joint swelling, myalgias, neck pain and neck stiffness. Skin: Negative for rash. Neurological: Negative for dizziness, syncope, weakness, light-headedness, numbness and headaches.        Objective:     /60 (Site: Right Upper Arm, Position: Sitting, Cuff Size: Medium Adult)   Pulse 60   Resp 13   Ht 5' 10\"

## 2018-10-22 PROBLEM — N17.9 AKI (ACUTE KIDNEY INJURY) (HCC): Status: ACTIVE | Noted: 2018-01-01

## 2018-10-22 PROBLEM — N18.4 CKD (CHRONIC KIDNEY DISEASE), STAGE IV (HCC): Status: ACTIVE | Noted: 2018-01-01

## 2018-10-24 NOTE — CARE COORDINATION
Name: Hussain Schmidt  YOB: 1933  MRN: [id number]    Encounter ID: [id number]  Arrival Date: N/A  Discharge Date: N/A    Related to: CHF-COPD-Diabetes High Touch UA (CHF-COPD-Diabetes High Touch UA) ([email or web address])    Questions     Question 1   Low Blood Sugar   Please press 1 for yes. Please press 2 for no. Low Blood Sugar Symptoms (Issue Panel: High Priority CHF COPD Diabetes)  Required Interventions and Feedback     Call Status       *Call Status (Required):  Patient Reached (edited by Gustavo Ellis on 10/24/2018 11:24 AM EDT)    Call status details:  Patient states that he has low BS at tiems he states that his BS drops to < 70 at times  (edited by Gustavo Ellis on 10/24/2018 11:24 AM EDT)       Pre-Call Opportunity Review       High Priority :  Yes (edited by Gustavo Ellis on 10/24/2018 11:24 AM EDT)       Diabetes Symptoms and Assessment       Additional details about diet:  patient states that he does not eat 3 meals a day skips last meal m (edited by Gustavo Ellis on 10/24/2018 11:25 AM EDT)    Date and time of last fasting blood sugar level :  10/24/2018 08:00 AM EDT (edited by Gustavo Ellis on 10/24/2018 11:29 AM EDT)    How often do you test your blood sugar?:  Not frequent enough (selected by Gustavo Ellis on 10/24/2018 11:28 AM EDT)    Do you have at home equipment? :  Yes  (edited by Gustavo Ellis on 10/24/2018 11:29 AM EDT)       CHF Symptoms and Assessment        Symptom Free Text Details:  Patient states that at times his heart rate is very fast 150 and at times it is in the 42's    Educated patient that low sugar can cause the heart to race. Advised patient to let his heart MD know about his increases heart rate at scheduled appointment next week .   Advised patient that if his has any other S/S when his heart rate is high or slow to call 911 (edited by Gustavo Ellis on 10/24/2018 11:40 AM EDT)       Interventions Provided       Education:  Diet (edited by Gustavo Ellis on 10/24/2018 11:30 AM EDT)    Additional details

## 2018-10-26 NOTE — CARE COORDINATION
10:26 AM EDT)       COPD Symptoms and Assessment        Symptom Free Text Details:  No COPD S/S (edited by Beverly Rios on 10/26/2018 10:24 AM EDT)       CHF Symptoms and Assessment        Following sodium restrictions :  Yes (edited by Beverly Rios on 10/26/2018 10:23 AM EDT)    Scale :  Has scale (selected by ANGEL on 10/26/2018 10:23 AM EDT)    Symptom Free Text Details:  No CHF S/S (edited by Beverly Rios on 10/26/2018 10:23 AM EDT)       Interventions Provided       Education:  Disease Management (edited by Beverly Rios on 10/26/2018 10:29 AM EDT)    Additional details regarding education:  suggested to patient to check BS at least daily and to check especially when he is symptomatic. Was also told at last appointment o check BS daily.  (edited by Beverly Rios on 10/26/2018 10:33 AM EDT)    PCP Appointment Scheduled: Other (Provide details below) (selected by ANGEL on 10/26/2018 10:29 AM EDT)    PCP Appointment Date:  01/16/2019 11:00 AM EST (edited by Beverly Rios on 10/26/2018 10:31 AM EDT)    Additional details regarding follow-up appt:  Saw PCP 10/16/18 (edited by Beverly Rios on 10/26/2018 10:31 AM EDT)    Notified PCP:  Yes (edited by Beverly Rios on 10/26/2018 10:32 AM EDT)      Beatris Gomez LPN  [id number] [address]

## 2018-11-01 NOTE — TELEPHONE ENCOUNTER
Meg called requesting Clonidine 0.2 mg one tablet three times daily, 90 days if possible. Cookie Martell said that this dosage is what is on the bottle.       Ken Hankins)

## 2018-11-19 PROBLEM — N17.9 ACUTE RENAL FAILURE SUPERIMPOSED ON STAGE 4 CHRONIC KIDNEY DISEASE (HCC): Status: RESOLVED | Noted: 2018-04-11 | Resolved: 2018-01-01

## 2018-11-19 PROBLEM — R10.9 ABDOMINAL PAIN: Status: RESOLVED | Noted: 2018-04-10 | Resolved: 2018-01-01

## 2018-11-19 PROBLEM — J90 BILATERAL PLEURAL EFFUSION: Status: RESOLVED | Noted: 2018-04-28 | Resolved: 2018-01-01

## 2018-11-19 PROBLEM — N18.4 CKD (CHRONIC KIDNEY DISEASE), STAGE IV (HCC): Status: RESOLVED | Noted: 2018-01-01 | Resolved: 2018-01-01

## 2018-11-19 PROBLEM — N17.9 AKI (ACUTE KIDNEY INJURY) (HCC): Status: ACTIVE | Noted: 2018-01-01

## 2018-11-19 PROBLEM — E86.0 DEHYDRATION, MODERATE: Status: RESOLVED | Noted: 2018-04-10 | Resolved: 2018-01-01

## 2018-11-19 PROBLEM — E87.0 HYPERNATREMIA: Status: RESOLVED | Noted: 2018-04-09 | Resolved: 2018-01-01

## 2018-11-19 PROBLEM — E43 SEVERE MALNUTRITION (HCC): Chronic | Status: RESOLVED | Noted: 2018-04-27 | Resolved: 2018-01-01

## 2018-11-19 PROBLEM — A41.9 SEPSIS (HCC): Status: RESOLVED | Noted: 2018-04-13 | Resolved: 2018-01-01

## 2018-11-19 PROBLEM — N17.9 AKI (ACUTE KIDNEY INJURY) (HCC): Status: RESOLVED | Noted: 2018-01-01 | Resolved: 2018-01-01

## 2018-11-19 PROBLEM — D63.8 ANEMIA OF CHRONIC DISEASE: Status: ACTIVE | Noted: 2018-01-01

## 2018-11-19 PROBLEM — N18.4 ACUTE RENAL FAILURE SUPERIMPOSED ON STAGE 4 CHRONIC KIDNEY DISEASE (HCC): Status: RESOLVED | Noted: 2018-04-11 | Resolved: 2018-01-01

## 2018-11-19 PROBLEM — I82.442 ACUTE DEEP VEIN THROMBOSIS (DVT) OF TIBIAL VEIN OF LEFT LOWER EXTREMITY (HCC): Status: RESOLVED | Noted: 2018-04-13 | Resolved: 2018-01-01

## 2018-11-19 PROBLEM — R11.2 NAUSEA AND VOMITING: Status: RESOLVED | Noted: 2018-04-10 | Resolved: 2018-01-01

## 2018-11-19 PROBLEM — K52.9 GE (GASTROENTERITIS): Status: RESOLVED | Noted: 2018-04-10 | Resolved: 2018-01-01

## 2018-11-19 PROBLEM — J96.90 RESPIRATORY FAILURE (HCC): Status: RESOLVED | Noted: 2018-04-23 | Resolved: 2018-01-01

## 2018-11-19 PROBLEM — J96.01 ACUTE RESPIRATORY FAILURE WITH HYPOXIA (HCC): Status: RESOLVED | Noted: 2018-04-12 | Resolved: 2018-01-01

## 2018-12-30 NOTE — CARE COORDINATION
Name: Shad Schumacher  YOB: 1933  MRN: [id number]    Encounter ID: [id number]  Arrival Date: N/A  Discharge Date: N/A    Related to: CHF-COPD-Diabetes High Touch UA (CHF-COPD-Diabetes High Touch UA) ([email or web address])    Questions     Question 1   Low Blood Sugar   Please press 1 for yes. Please press 2 for no. Low Blood Sugar Symptoms (Issue Panel: High Priority CHF COPD Diabetes)  Required Interventions and Feedback     Call Status       *Call Status (Required):  No Answer/No option to leave voicemail (Subsequent Call) (edited by MIKE on 12/30/2018 03:29 PM EST)       Areas of Concern       Use this field to record any areas of concern and tag team members here. **REMINDER: Do you need to update anything on the patients profile? (Weight or Blood Sugar Level?):      attempted to call patient back due to alert , phone rang for an extended period of time without any answer .   (Created at: 12/30/2018 03:29 PM EST  Edited at: 12/30/2018 03:30 PM EST  Encounter:  [id number]  Origin: [employer])

## 2019-01-01 ENCOUNTER — HOSPITAL ENCOUNTER (OUTPATIENT)
Age: 84
Discharge: HOME | DRG: 280 | End: 2019-07-12
Payer: MEDICARE

## 2019-01-01 ENCOUNTER — HOSPITAL ENCOUNTER (OUTPATIENT)
Dept: NURSING | Age: 84
Discharge: HOME | End: 2019-07-08
Payer: MEDICARE

## 2019-01-01 ENCOUNTER — TELEPHONE (OUTPATIENT)
Dept: FAMILY MEDICINE CLINIC | Age: 84
End: 2019-01-01

## 2019-01-01 ENCOUNTER — HOSPITAL ENCOUNTER (OUTPATIENT)
Age: 84
Discharge: HOME | End: 2019-07-30
Payer: MEDICARE

## 2019-01-01 ENCOUNTER — APPOINTMENT (OUTPATIENT)
Dept: INTERVENTIONAL RADIOLOGY/VASCULAR | Age: 84
DRG: 871 | End: 2019-01-01
Payer: MEDICARE

## 2019-01-01 ENCOUNTER — CARE COORDINATION (OUTPATIENT)
Dept: CASE MANAGEMENT | Age: 84
End: 2019-01-01

## 2019-01-01 ENCOUNTER — OFFICE VISIT (OUTPATIENT)
Dept: CARDIOLOGY CLINIC | Age: 84
End: 2019-01-01
Payer: MEDICARE

## 2019-01-01 ENCOUNTER — TELEPHONE (OUTPATIENT)
Dept: NEPHROLOGY | Age: 84
End: 2019-01-01

## 2019-01-01 ENCOUNTER — HOSPITAL ENCOUNTER (INPATIENT)
Age: 84
LOS: 10 days | Discharge: HOME | DRG: 280 | End: 2019-07-22
Attending: INTERNAL MEDICINE | Admitting: INTERNAL MEDICINE
Payer: MEDICARE

## 2019-01-01 ENCOUNTER — HOSPITAL ENCOUNTER (OUTPATIENT)
Age: 84
Discharge: HOME | End: 2019-08-27
Payer: MEDICARE

## 2019-01-01 ENCOUNTER — APPOINTMENT (OUTPATIENT)
Dept: GENERAL RADIOLOGY | Age: 84
DRG: 280 | End: 2019-01-01
Payer: MEDICARE

## 2019-01-01 ENCOUNTER — OFFICE VISIT (OUTPATIENT)
Dept: NEPHROLOGY | Age: 84
End: 2019-01-01
Payer: MEDICARE

## 2019-01-01 ENCOUNTER — PROCEDURE VISIT (OUTPATIENT)
Dept: CARDIOLOGY CLINIC | Age: 84
End: 2019-01-01
Payer: MEDICARE

## 2019-01-01 ENCOUNTER — APPOINTMENT (OUTPATIENT)
Dept: GENERAL RADIOLOGY | Age: 84
DRG: 871 | End: 2019-01-01
Payer: MEDICARE

## 2019-01-01 ENCOUNTER — HOSPITAL ENCOUNTER (OUTPATIENT)
Age: 84
Discharge: HOME | End: 2019-06-03
Payer: MEDICARE

## 2019-01-01 ENCOUNTER — CARE COORDINATION (OUTPATIENT)
Dept: CARE COORDINATION | Age: 84
End: 2019-01-01

## 2019-01-01 ENCOUNTER — HOSPITAL ENCOUNTER (INPATIENT)
Age: 84
LOS: 3 days | DRG: 871 | End: 2019-09-09
Attending: EMERGENCY MEDICINE | Admitting: EMERGENCY MEDICINE
Payer: MEDICARE

## 2019-01-01 ENCOUNTER — HOSPITAL ENCOUNTER (OUTPATIENT)
Dept: CARDIAC REHAB | Age: 84
Setting detail: THERAPIES SERIES
Discharge: HOME | End: 2019-08-08
Payer: MEDICARE

## 2019-01-01 ENCOUNTER — OFFICE VISIT (OUTPATIENT)
Dept: FAMILY MEDICINE CLINIC | Age: 84
End: 2019-01-01

## 2019-01-01 ENCOUNTER — HOSPITAL ENCOUNTER (OUTPATIENT)
Age: 84
Discharge: HOME | End: 2019-01-24
Payer: MEDICARE

## 2019-01-01 ENCOUNTER — HOSPITAL ENCOUNTER (OUTPATIENT)
Age: 84
Discharge: HOME | End: 2019-04-04
Payer: MEDICARE

## 2019-01-01 VITALS
DIASTOLIC BLOOD PRESSURE: 78 MMHG | OXYGEN SATURATION: 98 % | TEMPERATURE: 97.4 F | HEART RATE: 71 BPM | SYSTOLIC BLOOD PRESSURE: 176 MMHG | RESPIRATION RATE: 16 BRPM

## 2019-01-01 VITALS
DIASTOLIC BLOOD PRESSURE: 72 MMHG | BODY MASS INDEX: 22.76 KG/M2 | HEART RATE: 58 BPM | WEIGHT: 145 LBS | RESPIRATION RATE: 12 BRPM | SYSTOLIC BLOOD PRESSURE: 130 MMHG | HEIGHT: 67 IN

## 2019-01-01 VITALS
DIASTOLIC BLOOD PRESSURE: 80 MMHG | HEIGHT: 69 IN | BODY MASS INDEX: 22.22 KG/M2 | SYSTOLIC BLOOD PRESSURE: 172 MMHG | WEIGHT: 150 LBS | HEART RATE: 66 BPM

## 2019-01-01 VITALS
DIASTOLIC BLOOD PRESSURE: 59 MMHG | WEIGHT: 133.38 LBS | TEMPERATURE: 97.9 F | OXYGEN SATURATION: 95 % | BODY MASS INDEX: 19.76 KG/M2 | SYSTOLIC BLOOD PRESSURE: 74 MMHG | HEIGHT: 69 IN

## 2019-01-01 VITALS
WEIGHT: 145.5 LBS | DIASTOLIC BLOOD PRESSURE: 60 MMHG | SYSTOLIC BLOOD PRESSURE: 172 MMHG | HEIGHT: 67 IN | HEART RATE: 68 BPM | BODY MASS INDEX: 22.84 KG/M2

## 2019-01-01 VITALS
HEIGHT: 67 IN | BODY MASS INDEX: 22.85 KG/M2 | OXYGEN SATURATION: 100 % | WEIGHT: 145.6 LBS | HEART RATE: 63 BPM | DIASTOLIC BLOOD PRESSURE: 70 MMHG | SYSTOLIC BLOOD PRESSURE: 161 MMHG

## 2019-01-01 VITALS
SYSTOLIC BLOOD PRESSURE: 156 MMHG | HEART RATE: 56 BPM | DIASTOLIC BLOOD PRESSURE: 65 MMHG | OXYGEN SATURATION: 100 % | BODY MASS INDEX: 22.65 KG/M2 | WEIGHT: 144.6 LBS

## 2019-01-01 VITALS
SYSTOLIC BLOOD PRESSURE: 121 MMHG | HEART RATE: 83 BPM | HEIGHT: 69 IN | WEIGHT: 148.6 LBS | BODY MASS INDEX: 22.01 KG/M2 | DIASTOLIC BLOOD PRESSURE: 66 MMHG | OXYGEN SATURATION: 96 %

## 2019-01-01 VITALS
RESPIRATION RATE: 14 BRPM | HEIGHT: 69 IN | DIASTOLIC BLOOD PRESSURE: 80 MMHG | SYSTOLIC BLOOD PRESSURE: 140 MMHG | WEIGHT: 148.38 LBS | BODY MASS INDEX: 21.98 KG/M2 | HEART RATE: 72 BPM

## 2019-01-01 VITALS
HEIGHT: 66 IN | HEART RATE: 80 BPM | WEIGHT: 146.38 LBS | RESPIRATION RATE: 14 BRPM | DIASTOLIC BLOOD PRESSURE: 60 MMHG | BODY MASS INDEX: 23.53 KG/M2 | SYSTOLIC BLOOD PRESSURE: 136 MMHG

## 2019-01-01 VITALS
SYSTOLIC BLOOD PRESSURE: 200 MMHG | HEART RATE: 54 BPM | OXYGEN SATURATION: 100 % | WEIGHT: 145.6 LBS | DIASTOLIC BLOOD PRESSURE: 72 MMHG | BODY MASS INDEX: 22.8 KG/M2

## 2019-01-01 VITALS — HEART RATE: 144 BPM | SYSTOLIC BLOOD PRESSURE: 144 MMHG | DIASTOLIC BLOOD PRESSURE: 82 MMHG

## 2019-01-01 VITALS
BODY MASS INDEX: 21.37 KG/M2 | RESPIRATION RATE: 18 BRPM | OXYGEN SATURATION: 97 % | WEIGHT: 149.3 LBS | DIASTOLIC BLOOD PRESSURE: 70 MMHG | SYSTOLIC BLOOD PRESSURE: 157 MMHG | TEMPERATURE: 97.4 F | HEIGHT: 70 IN | HEART RATE: 51 BPM

## 2019-01-01 VITALS
HEIGHT: 69 IN | DIASTOLIC BLOOD PRESSURE: 70 MMHG | OXYGEN SATURATION: 98 % | HEART RATE: 91 BPM | BODY MASS INDEX: 22.48 KG/M2 | WEIGHT: 151.8 LBS | SYSTOLIC BLOOD PRESSURE: 130 MMHG

## 2019-01-01 VITALS
HEIGHT: 69 IN | WEIGHT: 152 LBS | HEART RATE: 92 BPM | RESPIRATION RATE: 20 BRPM | BODY MASS INDEX: 22.51 KG/M2 | SYSTOLIC BLOOD PRESSURE: 122 MMHG | DIASTOLIC BLOOD PRESSURE: 60 MMHG

## 2019-01-01 VITALS
HEIGHT: 69 IN | WEIGHT: 147 LBS | HEART RATE: 52 BPM | DIASTOLIC BLOOD PRESSURE: 73 MMHG | BODY MASS INDEX: 21.77 KG/M2 | SYSTOLIC BLOOD PRESSURE: 198 MMHG

## 2019-01-01 VITALS
WEIGHT: 147.6 LBS | HEIGHT: 70 IN | RESPIRATION RATE: 16 BRPM | HEART RATE: 52 BPM | SYSTOLIC BLOOD PRESSURE: 138 MMHG | DIASTOLIC BLOOD PRESSURE: 66 MMHG | BODY MASS INDEX: 21.13 KG/M2

## 2019-01-01 DIAGNOSIS — N17.9 ACUTE RENAL FAILURE SUPERIMPOSED ON CHRONIC KIDNEY DISEASE, UNSPECIFIED CKD STAGE, UNSPECIFIED ACUTE RENAL FAILURE TYPE (HCC): ICD-10-CM

## 2019-01-01 DIAGNOSIS — N18.4 ANEMIA IN STAGE 4 CHRONIC KIDNEY DISEASE (HCC): ICD-10-CM

## 2019-01-01 DIAGNOSIS — E08.22 DIABETES MELLITUS DUE TO UNDERLYING CONDITION WITH STAGE 4 CHRONIC KIDNEY DISEASE, UNSPECIFIED WHETHER LONG TERM INSULIN USE (HCC): ICD-10-CM

## 2019-01-01 DIAGNOSIS — I10 ESSENTIAL HYPERTENSION: ICD-10-CM

## 2019-01-01 DIAGNOSIS — D63.8 ANEMIA OF CHRONIC DISEASE: Primary | ICD-10-CM

## 2019-01-01 DIAGNOSIS — I21.4 NON-ST ELEVATION MI (NSTEMI) (HCC): ICD-10-CM

## 2019-01-01 DIAGNOSIS — D50.8 IRON DEFICIENCY ANEMIA DUE TO DIETARY CAUSES: ICD-10-CM

## 2019-01-01 DIAGNOSIS — D63.8 ANEMIA OF CHRONIC DISEASE: ICD-10-CM

## 2019-01-01 DIAGNOSIS — I50.9 ACUTE ON CHRONIC CONGESTIVE HEART FAILURE, UNSPECIFIED HEART FAILURE TYPE (HCC): Primary | ICD-10-CM

## 2019-01-01 DIAGNOSIS — N25.81 HYPERPARATHYROIDISM, SECONDARY RENAL (HCC): ICD-10-CM

## 2019-01-01 DIAGNOSIS — N18.9 ACUTE RENAL FAILURE SUPERIMPOSED ON CHRONIC KIDNEY DISEASE, UNSPECIFIED CKD STAGE, UNSPECIFIED ACUTE RENAL FAILURE TYPE (HCC): ICD-10-CM

## 2019-01-01 DIAGNOSIS — N18.4 DIABETES MELLITUS DUE TO UNDERLYING CONDITION WITH STAGE 4 CHRONIC KIDNEY DISEASE, UNSPECIFIED WHETHER LONG TERM INSULIN USE (HCC): ICD-10-CM

## 2019-01-01 DIAGNOSIS — I48.91 ATRIAL FIBRILLATION WITH RVR (HCC): Primary | ICD-10-CM

## 2019-01-01 DIAGNOSIS — I25.119 CORONARY ARTERY DISEASE INVOLVING NATIVE CORONARY ARTERY OF NATIVE HEART WITH ANGINA PECTORIS (HCC): ICD-10-CM

## 2019-01-01 DIAGNOSIS — N18.5 STAGE 5 CHRONIC KIDNEY DISEASE NOT ON CHRONIC DIALYSIS (HCC): ICD-10-CM

## 2019-01-01 DIAGNOSIS — N18.4 CKD (CHRONIC KIDNEY DISEASE), STAGE IV (HCC): Primary | ICD-10-CM

## 2019-01-01 DIAGNOSIS — I48.20 CHRONIC ATRIAL FIBRILLATION (HCC): ICD-10-CM

## 2019-01-01 DIAGNOSIS — E08.22 DIABETES MELLITUS DUE TO UNDERLYING CONDITION WITH STAGE 4 CHRONIC KIDNEY DISEASE, UNSPECIFIED WHETHER LONG TERM INSULIN USE (HCC): Primary | ICD-10-CM

## 2019-01-01 DIAGNOSIS — E78.01 FAMILIAL HYPERCHOLESTEROLEMIA: ICD-10-CM

## 2019-01-01 DIAGNOSIS — Z09 HOSPITAL DISCHARGE FOLLOW-UP: Primary | ICD-10-CM

## 2019-01-01 DIAGNOSIS — Z23 NEED FOR PROPHYLACTIC VACCINATION AGAINST HEPATITIS B VIRUS: ICD-10-CM

## 2019-01-01 DIAGNOSIS — Z11.59 NEED FOR HEPATITIS B SCREENING TEST: ICD-10-CM

## 2019-01-01 DIAGNOSIS — N18.4 CHRONIC KIDNEY DISEASE, STAGE IV (SEVERE) (HCC): Primary | ICD-10-CM

## 2019-01-01 DIAGNOSIS — J44.1 COPD WITH ACUTE EXACERBATION (HCC): Primary | ICD-10-CM

## 2019-01-01 DIAGNOSIS — I10 ESSENTIAL HYPERTENSION: Primary | ICD-10-CM

## 2019-01-01 DIAGNOSIS — I48.91 ATRIAL FIBRILLATION WITH RVR (HCC): ICD-10-CM

## 2019-01-01 DIAGNOSIS — N18.4 CKD (CHRONIC KIDNEY DISEASE), STAGE IV (HCC): ICD-10-CM

## 2019-01-01 DIAGNOSIS — I48.0 PAROXYSMAL ATRIAL FIBRILLATION (HCC): Primary | ICD-10-CM

## 2019-01-01 DIAGNOSIS — I48.0 PAF (PAROXYSMAL ATRIAL FIBRILLATION) (HCC): Primary | ICD-10-CM

## 2019-01-01 DIAGNOSIS — D63.1 ANEMIA IN STAGE 4 CHRONIC KIDNEY DISEASE (HCC): ICD-10-CM

## 2019-01-01 DIAGNOSIS — I73.9 PAD (PERIPHERAL ARTERY DISEASE) (HCC): ICD-10-CM

## 2019-01-01 DIAGNOSIS — N18.5 STAGE 5 CHRONIC KIDNEY DISEASE NOT ON CHRONIC DIALYSIS (HCC): Primary | ICD-10-CM

## 2019-01-01 DIAGNOSIS — I48.0 PAF (PAROXYSMAL ATRIAL FIBRILLATION) (HCC): ICD-10-CM

## 2019-01-01 DIAGNOSIS — I50.43 ACUTE ON CHRONIC COMBINED SYSTOLIC AND DIASTOLIC CONGESTIVE HEART FAILURE (HCC): ICD-10-CM

## 2019-01-01 DIAGNOSIS — Z23 NEED FOR PROPHYLACTIC VACCINATION AGAINST HEPATITIS B VIRUS: Primary | ICD-10-CM

## 2019-01-01 DIAGNOSIS — E55.9 VITAMIN D DEFICIENCY: ICD-10-CM

## 2019-01-01 DIAGNOSIS — E43 SEVERE MALNUTRITION (HCC): ICD-10-CM

## 2019-01-01 DIAGNOSIS — R11.2 NON-INTRACTABLE VOMITING WITH NAUSEA, UNSPECIFIED VOMITING TYPE: ICD-10-CM

## 2019-01-01 DIAGNOSIS — E78.5 HYPERLIPIDEMIA, UNSPECIFIED HYPERLIPIDEMIA TYPE: ICD-10-CM

## 2019-01-01 DIAGNOSIS — N18.4 DIABETES MELLITUS DUE TO UNDERLYING CONDITION WITH STAGE 4 CHRONIC KIDNEY DISEASE, UNSPECIFIED WHETHER LONG TERM INSULIN USE (HCC): Primary | ICD-10-CM

## 2019-01-01 DIAGNOSIS — E11.9 TYPE 2 DIABETES MELLITUS WITHOUT COMPLICATION, WITHOUT LONG-TERM CURRENT USE OF INSULIN (HCC): Primary | ICD-10-CM

## 2019-01-01 DIAGNOSIS — N18.4 CHRONIC KIDNEY DISEASE (CKD), STAGE IV (SEVERE) (HCC): ICD-10-CM

## 2019-01-01 DIAGNOSIS — R11.2 NON-INTRACTABLE VOMITING WITH NAUSEA, UNSPECIFIED VOMITING TYPE: Primary | ICD-10-CM

## 2019-01-01 DIAGNOSIS — I50.31 ACUTE DIASTOLIC CONGESTIVE HEART FAILURE (HCC): ICD-10-CM

## 2019-01-01 DIAGNOSIS — N18.4 CHRONIC KIDNEY DISEASE, STAGE IV (SEVERE) (HCC): ICD-10-CM

## 2019-01-01 DIAGNOSIS — E87.20 METABOLIC ACIDOSIS: ICD-10-CM

## 2019-01-01 DIAGNOSIS — I25.10 CAD IN NATIVE ARTERY: ICD-10-CM

## 2019-01-01 DIAGNOSIS — I48.91 ATRIAL FIBRILLATION, UNSPECIFIED TYPE (HCC): Primary | ICD-10-CM

## 2019-01-01 DIAGNOSIS — N18.9 CHRONIC RENAL IMPAIRMENT, UNSPECIFIED CKD STAGE: ICD-10-CM

## 2019-01-01 LAB
ACTIVATED CLOTTING TIME: 213 SECONDS (ref 1–150)
ALBUMIN SERPL-MCNC: 2.9 G/DL (ref 3.5–5.1)
ALBUMIN SERPL-MCNC: 3.3 G/DL (ref 3.5–5.1)
ALBUMIN SERPL-MCNC: 3.7 G/DL (ref 3.5–5.1)
ALBUMIN SERPL-MCNC: 4.2 G/DL (ref 3.5–5.1)
ALLEN TEST: ABNORMAL
ALLEN TEST: POSITIVE
ALP BLD-CCNC: 161 U/L (ref 38–126)
ALP BLD-CCNC: 174 U/L (ref 38–126)
ALP BLD-CCNC: 196 U/L (ref 38–126)
ALP BLD-CCNC: 245 U/L (ref 38–126)
ALT SERPL-CCNC: 12 U/L (ref 11–66)
ALT SERPL-CCNC: 13 U/L (ref 11–66)
ALT SERPL-CCNC: 31 U/L (ref 11–66)
ALT SERPL-CCNC: 525 U/L (ref 11–66)
ALT SERPL-CCNC: 622 U/L (ref 11–66)
ANION GAP SERPL CALCULATED.3IONS-SCNC: 14 MEQ/L (ref 8–16)
ANION GAP SERPL CALCULATED.3IONS-SCNC: 15 MEQ/L (ref 8–16)
ANION GAP SERPL CALCULATED.3IONS-SCNC: 16 MEQ/L (ref 8–16)
ANION GAP SERPL CALCULATED.3IONS-SCNC: 17 MEQ/L (ref 8–16)
ANION GAP SERPL CALCULATED.3IONS-SCNC: 19 MEQ/L (ref 8–16)
ANION GAP SERPL CALCULATED.3IONS-SCNC: 19 MEQ/L (ref 8–16)
ANION GAP SERPL CALCULATED.3IONS-SCNC: 20 MEQ/L (ref 8–16)
ANION GAP SERPL CALCULATED.3IONS-SCNC: 22 MEQ/L (ref 8–16)
ANION GAP SERPL CALCULATED.3IONS-SCNC: 23 MEQ/L (ref 8–16)
ANION GAP SERPL CALCULATED.3IONS-SCNC: 25 MEQ/L (ref 8–16)
ANION GAP SERPL CALCULATED.3IONS-SCNC: 29 MEQ/L (ref 8–16)
APTT: 185 SECONDS (ref 22–38)
APTT: 34.3 SECONDS (ref 22–38)
APTT: 40.5 SECONDS (ref 22–38)
APTT: 45.5 SECONDS (ref 22–38)
APTT: 47.3 SECONDS (ref 22–38)
APTT: 49.4 SECONDS (ref 22–38)
APTT: 51.2 SECONDS (ref 22–38)
APTT: 68.1 SECONDS (ref 22–38)
APTT: 77.4 SECONDS (ref 22–38)
APTT: 89.6 SECONDS (ref 22–38)
APTT: 97.9 SECONDS (ref 22–38)
AST SERPL-CCNC: 15 U/L (ref 5–40)
AST SERPL-CCNC: 19 U/L (ref 5–40)
AST SERPL-CCNC: 354 U/L (ref 5–40)
AST SERPL-CCNC: 510 U/L (ref 5–40)
AST SERPL-CCNC: 54 U/L (ref 5–40)
BACTERIA: ABNORMAL
BACTERIA: ABNORMAL /HPF
BASE EXCESS (CALCULATED): -0.7 MMOL/L (ref -2.5–2.5)
BASE EXCESS (CALCULATED): -19 MMOL/L (ref -2.5–2.5)
BASE EXCESS (CALCULATED): -4.9 MMOL/L (ref -2.5–2.5)
BASOPHILS # BLD: 0.2 %
BASOPHILS # BLD: 0.2 %
BASOPHILS # BLD: 0.5 %
BASOPHILS # BLD: 0.6 %
BASOPHILS # BLD: 0.6 %
BASOPHILS ABSOLUTE: 0 THOU/MM3 (ref 0–0.1)
BILIRUB SERPL-MCNC: 0.5 MG/DL (ref 0.3–1.2)
BILIRUB SERPL-MCNC: 0.8 MG/DL (ref 0.3–1.2)
BILIRUB SERPL-MCNC: 0.8 MG/DL (ref 0.3–1.2)
BILIRUB SERPL-MCNC: 1.5 MG/DL (ref 0.3–1.2)
BILIRUBIN DIRECT: 0.3 MG/DL (ref 0–0.3)
BILIRUBIN DIRECT: 0.4 MG/DL (ref 0–0.3)
BILIRUBIN DIRECT: < 0.2 MG/DL (ref 0–0.3)
BILIRUBIN URINE: NEGATIVE
BILIRUBIN URINE: NEGATIVE
BLOOD, URINE: ABNORMAL
BLOOD, URINE: NEGATIVE
BUN BLDV-MCNC: 16 MG/DL (ref 7–22)
BUN BLDV-MCNC: 44 MG/DL (ref 7–22)
BUN BLDV-MCNC: 46 MG/DL (ref 7–22)
BUN BLDV-MCNC: 49 MG/DL (ref 7–22)
BUN BLDV-MCNC: 50 MG/DL (ref 7–22)
BUN BLDV-MCNC: 50 MG/DL (ref 7–22)
BUN BLDV-MCNC: 51 MG/DL (ref 7–22)
BUN BLDV-MCNC: 55 MG/DL (ref 7–22)
BUN BLDV-MCNC: 57 MG/DL (ref 7–22)
BUN BLDV-MCNC: 58 MG/DL (ref 7–22)
BUN BLDV-MCNC: 60 MG/DL (ref 7–22)
BUN BLDV-MCNC: 65 MG/DL (ref 7–22)
BUN BLDV-MCNC: 67 MG/DL (ref 7–22)
BUN BLDV-MCNC: 68 MG/DL (ref 7–22)
BUN BLDV-MCNC: 69 MG/DL (ref 7–22)
BUN BLDV-MCNC: 71 MG/DL (ref 7–22)
BUN BLDV-MCNC: 71 MG/DL (ref 7–22)
BUN BLDV-MCNC: 72 MG/DL (ref 7–22)
BUN BLDV-MCNC: 74 MG/DL (ref 7–22)
BUN BLDV-MCNC: 81 MG/DL (ref 7–22)
BUN BLDV-MCNC: 81 MG/DL (ref 7–22)
CA 19-9: 56 U/ML (ref 0–35)
CALCIUM IONIZED: 1.02 MMOL/L (ref 1.12–1.32)
CALCIUM IONIZED: 1.05 MMOL/L (ref 1.12–1.32)
CALCIUM SERPL-MCNC: 8.3 MG/DL (ref 8.5–10.5)
CALCIUM SERPL-MCNC: 8.5 MG/DL (ref 8.5–10.5)
CALCIUM SERPL-MCNC: 8.6 MG/DL (ref 8.5–10.5)
CALCIUM SERPL-MCNC: 8.7 MG/DL (ref 8.5–10.5)
CALCIUM SERPL-MCNC: 8.7 MG/DL (ref 8.5–10.5)
CALCIUM SERPL-MCNC: 8.8 MG/DL (ref 8.5–10.5)
CALCIUM SERPL-MCNC: 8.8 MG/DL (ref 8.5–10.5)
CALCIUM SERPL-MCNC: 8.9 MG/DL (ref 8.5–10.5)
CALCIUM SERPL-MCNC: 9 MG/DL (ref 8.5–10.5)
CALCIUM SERPL-MCNC: 9.1 MG/DL (ref 8.5–10.5)
CALCIUM SERPL-MCNC: 9.2 MG/DL (ref 8.5–10.5)
CALCIUM SERPL-MCNC: 9.4 MG/DL (ref 8.5–10.5)
CALCIUM SERPL-MCNC: 9.6 MG/DL (ref 8.5–10.5)
CALCIUM SERPL-MCNC: 9.7 MG/DL (ref 8.5–10.5)
CASTS 2: ABNORMAL /LPF
CASTS UA: ABNORMAL /LPF
CASTS: ABNORMAL /LPF
CASTS: ABNORMAL /LPF
CHARACTER, URINE: CLEAR
CHARACTER, URINE: CLEAR
CHLORIDE BLD-SCNC: 102 MEQ/L (ref 98–111)
CHLORIDE BLD-SCNC: 103 MEQ/L (ref 98–111)
CHLORIDE BLD-SCNC: 104 MEQ/L (ref 98–111)
CHLORIDE BLD-SCNC: 104 MEQ/L (ref 98–111)
CHLORIDE BLD-SCNC: 106 MEQ/L (ref 98–111)
CHLORIDE BLD-SCNC: 106 MEQ/L (ref 98–111)
CHLORIDE BLD-SCNC: 107 MEQ/L (ref 98–111)
CHLORIDE BLD-SCNC: 108 MEQ/L (ref 98–111)
CHLORIDE BLD-SCNC: 108 MEQ/L (ref 98–111)
CHLORIDE BLD-SCNC: 109 MEQ/L (ref 98–111)
CHLORIDE BLD-SCNC: 110 MEQ/L (ref 98–111)
CHLORIDE BLD-SCNC: 92 MEQ/L (ref 98–111)
CHLORIDE BLD-SCNC: 97 MEQ/L (ref 98–111)
CHLORIDE BLD-SCNC: 98 MEQ/L (ref 98–111)
CHLORIDE BLD-SCNC: 98 MEQ/L (ref 98–111)
CHLORIDE BLD-SCNC: 99 MEQ/L (ref 98–111)
CHOLESTEROL, TOTAL: 118 MG/DL (ref 100–199)
CHP ED QC CHECK: ABNORMAL
CHP ED QC CHECK: NORMAL
CO2: 14 MEQ/L (ref 23–33)
CO2: 17 MEQ/L (ref 23–33)
CO2: 17 MEQ/L (ref 23–33)
CO2: 18 MEQ/L (ref 23–33)
CO2: 18 MEQ/L (ref 23–33)
CO2: 19 MEQ/L (ref 23–33)
CO2: 20 MEQ/L (ref 23–33)
CO2: 21 MEQ/L (ref 23–33)
CO2: 22 MEQ/L (ref 23–33)
CO2: 23 MEQ/L (ref 23–33)
CO2: 24 MEQ/L (ref 23–33)
COLLECTED BY:: ABNORMAL
COLOR: YELLOW
COLOR: YELLOW
CREAT SERPL-MCNC: 2.7 MG/DL (ref 0.4–1.2)
CREAT SERPL-MCNC: 3.8 MG/DL (ref 0.4–1.2)
CREAT SERPL-MCNC: 3.8 MG/DL (ref 0.4–1.2)
CREAT SERPL-MCNC: 3.9 MG/DL (ref 0.4–1.2)
CREAT SERPL-MCNC: 4 MG/DL (ref 0.4–1.2)
CREAT SERPL-MCNC: 4.3 MG/DL (ref 0.4–1.2)
CREAT SERPL-MCNC: 4.3 MG/DL (ref 0.4–1.2)
CREAT SERPL-MCNC: 4.6 MG/DL (ref 0.4–1.2)
CREAT SERPL-MCNC: 4.7 MG/DL (ref 0.4–1.2)
CREAT SERPL-MCNC: 4.7 MG/DL (ref 0.4–1.2)
CREAT SERPL-MCNC: 4.8 MG/DL (ref 0.4–1.2)
CREAT SERPL-MCNC: 4.8 MG/DL (ref 0.4–1.2)
CREAT SERPL-MCNC: 4.9 MG/DL (ref 0.4–1.2)
CREAT SERPL-MCNC: 4.9 MG/DL (ref 0.4–1.2)
CREAT SERPL-MCNC: 5 MG/DL (ref 0.4–1.2)
CREAT SERPL-MCNC: 5.2 MG/DL (ref 0.4–1.2)
CREAT SERPL-MCNC: 5.3 MG/DL (ref 0.4–1.2)
CREAT SERPL-MCNC: 6.4 MG/DL (ref 0.4–1.2)
CREAT SERPL-MCNC: 7.2 MG/DL (ref 0.4–1.2)
CREATININE URINE: 108.1 MG/DL
CRYSTALS, UA: ABNORMAL
CRYSTALS: ABNORMAL
DEVICE: ABNORMAL
EKG ATRIAL RATE: 108 BPM
EKG ATRIAL RATE: 119 BPM
EKG ATRIAL RATE: 127 BPM
EKG ATRIAL RATE: 394 BPM
EKG ATRIAL RATE: 66 BPM
EKG ATRIAL RATE: 72 BPM
EKG ATRIAL RATE: 83 BPM
EKG ATRIAL RATE: 90 BPM
EKG ATRIAL RATE: 95 BPM
EKG ATRIAL RATE: 98 BPM
EKG P AXIS: 54 DEGREES
EKG P AXIS: 54 DEGREES
EKG P AXIS: 64 DEGREES
EKG P AXIS: 65 DEGREES
EKG P AXIS: 73 DEGREES
EKG P AXIS: 79 DEGREES
EKG P-R INTERVAL: 148 MS
EKG P-R INTERVAL: 158 MS
EKG P-R INTERVAL: 160 MS
EKG P-R INTERVAL: 160 MS
EKG P-R INTERVAL: 170 MS
EKG P-R INTERVAL: 172 MS
EKG Q-T INTERVAL: 276 MS
EKG Q-T INTERVAL: 332 MS
EKG Q-T INTERVAL: 358 MS
EKG Q-T INTERVAL: 360 MS
EKG Q-T INTERVAL: 362 MS
EKG Q-T INTERVAL: 372 MS
EKG Q-T INTERVAL: 404 MS
EKG Q-T INTERVAL: 408 MS
EKG Q-T INTERVAL: 416 MS
EKG Q-T INTERVAL: 420 MS
EKG QRS DURATION: 70 MS
EKG QRS DURATION: 72 MS
EKG QRS DURATION: 74 MS
EKG QRS DURATION: 80 MS
EKG QRS DURATION: 82 MS
EKG QRS DURATION: 82 MS
EKG QRS DURATION: 84 MS
EKG QRS DURATION: 84 MS
EKG QTC CALCULATION (BAZETT): 412 MS
EKG QTC CALCULATION (BAZETT): 440 MS
EKG QTC CALCULATION (BAZETT): 452 MS
EKG QTC CALCULATION (BAZETT): 455 MS
EKG QTC CALCULATION (BAZETT): 457 MS
EKG QTC CALCULATION (BAZETT): 465 MS
EKG QTC CALCULATION (BAZETT): 469 MS
EKG QTC CALCULATION (BAZETT): 479 MS
EKG QTC CALCULATION (BAZETT): 485 MS
EKG QTC CALCULATION (BAZETT): 507 MS
EKG R AXIS: -31 DEGREES
EKG R AXIS: -42 DEGREES
EKG R AXIS: -47 DEGREES
EKG R AXIS: -51 DEGREES
EKG R AXIS: -52 DEGREES
EKG R AXIS: -58 DEGREES
EKG R AXIS: -63 DEGREES
EKG R AXIS: -63 DEGREES
EKG R AXIS: -65 DEGREES
EKG R AXIS: -72 DEGREES
EKG T AXIS: 105 DEGREES
EKG T AXIS: 109 DEGREES
EKG T AXIS: 112 DEGREES
EKG T AXIS: 116 DEGREES
EKG T AXIS: 118 DEGREES
EKG T AXIS: 51 DEGREES
EKG T AXIS: 62 DEGREES
EKG T AXIS: 65 DEGREES
EKG T AXIS: 69 DEGREES
EKG T AXIS: 96 DEGREES
EKG VENTRICULAR RATE: 108 BPM
EKG VENTRICULAR RATE: 118 BPM
EKG VENTRICULAR RATE: 134 BPM
EKG VENTRICULAR RATE: 66 BPM
EKG VENTRICULAR RATE: 72 BPM
EKG VENTRICULAR RATE: 83 BPM
EKG VENTRICULAR RATE: 95 BPM
EKG VENTRICULAR RATE: 95 BPM
EKG VENTRICULAR RATE: 96 BPM
EKG VENTRICULAR RATE: 98 BPM
EOSINOPHIL # BLD: 0 %
EOSINOPHIL # BLD: 0.1 %
EOSINOPHIL # BLD: 0.6 %
EOSINOPHIL # BLD: 3 %
EOSINOPHIL # BLD: 7.3 %
EOSINOPHILS ABSOLUTE: 0 THOU/MM3 (ref 0–0.4)
EOSINOPHILS ABSOLUTE: 0.2 THOU/MM3 (ref 0–0.4)
EOSINOPHILS ABSOLUTE: 0.6 THOU/MM3 (ref 0–0.4)
EPITHELIAL CELLS, UA: ABNORMAL /HPF
EPITHELIAL CELLS, UA: ABNORMAL /HPF
ERYTHROCYTE [DISTWIDTH] IN BLOOD BY AUTOMATED COUNT: 13.2 % (ref 11.5–14.5)
ERYTHROCYTE [DISTWIDTH] IN BLOOD BY AUTOMATED COUNT: 13.7 % (ref 11.5–14.5)
ERYTHROCYTE [DISTWIDTH] IN BLOOD BY AUTOMATED COUNT: 13.8 % (ref 11.5–14.5)
ERYTHROCYTE [DISTWIDTH] IN BLOOD BY AUTOMATED COUNT: 14 % (ref 11.5–14.5)
ERYTHROCYTE [DISTWIDTH] IN BLOOD BY AUTOMATED COUNT: 14.1 % (ref 11.5–14.5)
ERYTHROCYTE [DISTWIDTH] IN BLOOD BY AUTOMATED COUNT: 14.2 % (ref 11.5–14.5)
ERYTHROCYTE [DISTWIDTH] IN BLOOD BY AUTOMATED COUNT: 14.2 % (ref 11.5–14.5)
ERYTHROCYTE [DISTWIDTH] IN BLOOD BY AUTOMATED COUNT: 14.6 % (ref 11.5–14.5)
ERYTHROCYTE [DISTWIDTH] IN BLOOD BY AUTOMATED COUNT: 48.5 FL (ref 35–45)
ERYTHROCYTE [DISTWIDTH] IN BLOOD BY AUTOMATED COUNT: 48.9 FL (ref 35–45)
ERYTHROCYTE [DISTWIDTH] IN BLOOD BY AUTOMATED COUNT: 48.9 FL (ref 35–45)
ERYTHROCYTE [DISTWIDTH] IN BLOOD BY AUTOMATED COUNT: 49.4 FL (ref 35–45)
ERYTHROCYTE [DISTWIDTH] IN BLOOD BY AUTOMATED COUNT: 49.4 FL (ref 35–45)
ERYTHROCYTE [DISTWIDTH] IN BLOOD BY AUTOMATED COUNT: 50.4 FL (ref 35–45)
ERYTHROCYTE [DISTWIDTH] IN BLOOD BY AUTOMATED COUNT: 50.5 FL (ref 35–45)
ERYTHROCYTE [DISTWIDTH] IN BLOOD BY AUTOMATED COUNT: 50.8 FL (ref 35–45)
ERYTHROCYTE [DISTWIDTH] IN BLOOD BY AUTOMATED COUNT: 51 FL (ref 35–45)
ERYTHROCYTE [DISTWIDTH] IN BLOOD BY AUTOMATED COUNT: 51.4 FL (ref 35–45)
ERYTHROCYTE [DISTWIDTH] IN BLOOD BY AUTOMATED COUNT: 53.6 FL (ref 35–45)
FERRITIN: 5832 NG/ML (ref 22–322)
FERRITIN: 66 NG/ML (ref 22–322)
FERRITIN: 70 NG/ML (ref 22–322)
FOLATE: 11.8 NG/ML (ref 4.8–24.2)
GFR SERPL CREATININE-BSD FRML MDRD: 10 ML/MIN/1.73M2
GFR SERPL CREATININE-BSD FRML MDRD: 11 ML/MIN/1.73M2
GFR SERPL CREATININE-BSD FRML MDRD: 12 ML/MIN/1.73M2
GFR SERPL CREATININE-BSD FRML MDRD: 13 ML/MIN/1.73M2
GFR SERPL CREATININE-BSD FRML MDRD: 13 ML/MIN/1.73M2
GFR SERPL CREATININE-BSD FRML MDRD: 14 ML/MIN/1.73M2
GFR SERPL CREATININE-BSD FRML MDRD: 15 ML/MIN/1.73M2
GFR SERPL CREATININE-BSD FRML MDRD: 23 ML/MIN/1.73M2
GFR SERPL CREATININE-BSD FRML MDRD: 7 ML/MIN/1.73M2
GFR SERPL CREATININE-BSD FRML MDRD: 8 ML/MIN/1.73M2
GLUCOSE BLD-MCNC: 100 MG/DL (ref 70–108)
GLUCOSE BLD-MCNC: 103 MG/DL (ref 70–108)
GLUCOSE BLD-MCNC: 106 MG/DL (ref 70–108)
GLUCOSE BLD-MCNC: 107 MG/DL (ref 70–108)
GLUCOSE BLD-MCNC: 110 MG/DL (ref 70–108)
GLUCOSE BLD-MCNC: 110 MG/DL (ref 70–108)
GLUCOSE BLD-MCNC: 112 MG/DL
GLUCOSE BLD-MCNC: 112 MG/DL (ref 70–108)
GLUCOSE BLD-MCNC: 117 MG/DL (ref 70–108)
GLUCOSE BLD-MCNC: 119 MG/DL (ref 70–108)
GLUCOSE BLD-MCNC: 120 MG/DL (ref 70–108)
GLUCOSE BLD-MCNC: 123 MG/DL (ref 70–108)
GLUCOSE BLD-MCNC: 124 MG/DL
GLUCOSE BLD-MCNC: 126 MG/DL (ref 70–108)
GLUCOSE BLD-MCNC: 128 MG/DL (ref 70–108)
GLUCOSE BLD-MCNC: 130 MG/DL (ref 70–108)
GLUCOSE BLD-MCNC: 132 MG/DL (ref 70–108)
GLUCOSE BLD-MCNC: 133 MG/DL (ref 70–108)
GLUCOSE BLD-MCNC: 134 MG/DL (ref 70–108)
GLUCOSE BLD-MCNC: 135 MG/DL (ref 70–108)
GLUCOSE BLD-MCNC: 139 MG/DL (ref 70–108)
GLUCOSE BLD-MCNC: 144 MG/DL (ref 70–108)
GLUCOSE BLD-MCNC: 146 MG/DL (ref 70–108)
GLUCOSE BLD-MCNC: 149 MG/DL (ref 70–108)
GLUCOSE BLD-MCNC: 152 MG/DL (ref 70–108)
GLUCOSE BLD-MCNC: 155 MG/DL (ref 70–108)
GLUCOSE BLD-MCNC: 155 MG/DL (ref 70–108)
GLUCOSE BLD-MCNC: 157 MG/DL (ref 70–108)
GLUCOSE BLD-MCNC: 158 MG/DL (ref 70–108)
GLUCOSE BLD-MCNC: 161 MG/DL (ref 70–108)
GLUCOSE BLD-MCNC: 164 MG/DL (ref 70–108)
GLUCOSE BLD-MCNC: 165 MG/DL (ref 70–108)
GLUCOSE BLD-MCNC: 169 MG/DL (ref 70–108)
GLUCOSE BLD-MCNC: 170 MG/DL
GLUCOSE BLD-MCNC: 191 MG/DL (ref 70–108)
GLUCOSE BLD-MCNC: 195 MG/DL (ref 70–108)
GLUCOSE BLD-MCNC: 197 MG/DL (ref 70–108)
GLUCOSE BLD-MCNC: 198 MG/DL (ref 70–108)
GLUCOSE BLD-MCNC: 226 MG/DL (ref 70–108)
GLUCOSE BLD-MCNC: 281 MG/DL (ref 70–108)
GLUCOSE BLD-MCNC: 290 MG/DL (ref 70–108)
GLUCOSE BLD-MCNC: 346 MG/DL (ref 70–108)
GLUCOSE BLD-MCNC: 47 MG/DL (ref 70–108)
GLUCOSE BLD-MCNC: 56 MG/DL (ref 70–108)
GLUCOSE BLD-MCNC: 65 MG/DL (ref 70–108)
GLUCOSE BLD-MCNC: 68 MG/DL (ref 70–108)
GLUCOSE BLD-MCNC: 71 MG/DL (ref 70–108)
GLUCOSE BLD-MCNC: 71 MG/DL (ref 70–108)
GLUCOSE BLD-MCNC: 73 MG/DL (ref 70–108)
GLUCOSE BLD-MCNC: 74 MG/DL (ref 70–108)
GLUCOSE BLD-MCNC: 74 MG/DL (ref 70–108)
GLUCOSE BLD-MCNC: 75 MG/DL (ref 70–108)
GLUCOSE BLD-MCNC: 75 MG/DL (ref 70–108)
GLUCOSE BLD-MCNC: 77 MG/DL (ref 70–108)
GLUCOSE BLD-MCNC: 79 MG/DL (ref 70–108)
GLUCOSE BLD-MCNC: 80 MG/DL (ref 70–108)
GLUCOSE BLD-MCNC: 80 MG/DL (ref 70–108)
GLUCOSE BLD-MCNC: 82 MG/DL (ref 70–108)
GLUCOSE BLD-MCNC: 83 MG/DL (ref 70–108)
GLUCOSE BLD-MCNC: 84 MG/DL (ref 70–108)
GLUCOSE BLD-MCNC: 84 MG/DL (ref 70–108)
GLUCOSE BLD-MCNC: 85 MG/DL (ref 70–108)
GLUCOSE BLD-MCNC: 86 MG/DL (ref 70–108)
GLUCOSE BLD-MCNC: 89 MG/DL (ref 70–108)
GLUCOSE BLD-MCNC: 90 MG/DL (ref 70–108)
GLUCOSE BLD-MCNC: 92 MG/DL (ref 70–108)
GLUCOSE BLD-MCNC: 94 MG/DL (ref 70–108)
GLUCOSE BLD-MCNC: 96 MG/DL (ref 70–108)
GLUCOSE BLD-MCNC: 98 MG/DL (ref 70–108)
GLUCOSE BLD-MCNC: 99 MG/DL (ref 70–108)
GLUCOSE URINE: NEGATIVE MG/DL
GLUCOSE, URINE: NEGATIVE MG/DL
GRAM STAIN RESULT: ABNORMAL
HBA1C MFR BLD: 4.8 %
HBA1C MFR BLD: 5.1 %
HBA1C MFR BLD: 5.4 %
HBV SURFACE AB TITR SER: NEGATIVE {TITER}
HBV SURFACE AB TITR SER: NEGATIVE {TITER}
HCO3: 11 MMOL/L (ref 23–28)
HCO3: 19 MMOL/L (ref 23–28)
HCO3: 23 MMOL/L (ref 23–28)
HCT VFR BLD CALC: 26.6 % (ref 42–52)
HCT VFR BLD CALC: 27.1 % (ref 42–52)
HCT VFR BLD CALC: 27.2 % (ref 42–52)
HCT VFR BLD CALC: 27.5 % (ref 42–52)
HCT VFR BLD CALC: 27.5 % (ref 42–52)
HCT VFR BLD CALC: 27.9 % (ref 42–52)
HCT VFR BLD CALC: 29.1 % (ref 42–52)
HCT VFR BLD CALC: 29.1 % (ref 42–52)
HCT VFR BLD CALC: 29.3 % (ref 42–52)
HCT VFR BLD CALC: 29.7 % (ref 42–52)
HCT VFR BLD CALC: 30.2 % (ref 42–52)
HCT VFR BLD CALC: 31.2 % (ref 42–52)
HCT VFR BLD CALC: 31.4 % (ref 42–52)
HCT VFR BLD CALC: 31.8 % (ref 42–52)
HCT VFR BLD CALC: 31.9 % (ref 42–52)
HCT VFR BLD CALC: 32.5 % (ref 42–52)
HCT VFR BLD CALC: 33.5 % (ref 42–52)
HDLC SERPL-MCNC: 37 MG/DL
HEMOGLOBIN: 10.1 GM/DL (ref 14–18)
HEMOGLOBIN: 10.2 GM/DL (ref 14–18)
HEMOGLOBIN: 10.9 GM/DL (ref 14–18)
HEMOGLOBIN: 8.3 GM/DL (ref 14–18)
HEMOGLOBIN: 8.6 GM/DL (ref 14–18)
HEMOGLOBIN: 8.8 GM/DL (ref 14–18)
HEMOGLOBIN: 8.9 GM/DL (ref 14–18)
HEMOGLOBIN: 9.1 GM/DL (ref 14–18)
HEMOGLOBIN: 9.2 GM/DL (ref 14–18)
HEMOGLOBIN: 9.2 GM/DL (ref 14–18)
HEMOGLOBIN: 9.4 GM/DL (ref 14–18)
HEMOGLOBIN: 9.8 GM/DL (ref 14–18)
HEPATITIS B CORE IGM ANTIBODY: NEGATIVE
HEPATITIS B SURFACE ANTIGEN: NEGATIVE
HEPATITIS B SURFACE ANTIGEN: NEGATIVE
IFIO2: 100
IFIO2: 100
IFIO2: 6
IMMATURE GRANS (ABS): 0.02 THOU/MM3 (ref 0–0.07)
IMMATURE GRANS (ABS): 0.04 THOU/MM3 (ref 0–0.07)
IMMATURE GRANS (ABS): 0.09 THOU/MM3 (ref 0–0.07)
IMMATURE GRANULOCYTES: 0 %
IMMATURE GRANULOCYTES: 0.3 %
IMMATURE GRANULOCYTES: 0.3 %
IMMATURE GRANULOCYTES: 1 %
IMMATURE GRANULOCYTES: 1 %
IRON SATURATION: 10 % (ref 20–50)
IRON SATURATION: 37 % (ref 20–50)
IRON SATURATION: 67 % (ref 20–50)
IRON: 129 UG/DL (ref 65–195)
IRON: 30 UG/DL (ref 65–195)
IRON: 99 UG/DL (ref 65–195)
KETONES, URINE: NEGATIVE
KETONES, URINE: NEGATIVE
LACTIC ACID, SEPSIS: 2.2 MMOL/L (ref 0.5–1.9)
LACTIC ACID: 3.4 MMOL/L (ref 0.5–2.2)
LDL CHOLESTEROL CALCULATED: 64 MG/DL
LEUKOCYTE ESTERASE, URINE: NEGATIVE
LEUKOCYTE ESTERASE, URINE: NEGATIVE
LV EF: 58 %
LVEF MODALITY: NORMAL
LYMPHOCYTES # BLD: 10.7 %
LYMPHOCYTES # BLD: 15.2 %
LYMPHOCYTES # BLD: 4.3 %
LYMPHOCYTES # BLD: 7.5 %
LYMPHOCYTES # BLD: 9.7 %
LYMPHOCYTES ABSOLUTE: 0.5 THOU/MM3 (ref 1–4.8)
LYMPHOCYTES ABSOLUTE: 0.6 THOU/MM3 (ref 1–4.8)
LYMPHOCYTES ABSOLUTE: 0.8 THOU/MM3 (ref 1–4.8)
LYMPHOCYTES ABSOLUTE: 0.9 THOU/MM3 (ref 1–4.8)
LYMPHOCYTES ABSOLUTE: 1.2 THOU/MM3 (ref 1–4.8)
MAGNESIUM: 1.9 MG/DL (ref 1.6–2.4)
MAGNESIUM: 2 MG/DL (ref 1.6–2.4)
MAGNESIUM: 2 MG/DL (ref 1.6–2.4)
MAGNESIUM: 2.2 MG/DL (ref 1.6–2.4)
MAGNESIUM: 2.3 MG/DL (ref 1.6–2.4)
MCH RBC QN AUTO: 31 PG (ref 26–33)
MCH RBC QN AUTO: 31.2 PG (ref 26–33)
MCH RBC QN AUTO: 31.2 PG (ref 26–33)
MCH RBC QN AUTO: 31.4 PG (ref 26–33)
MCH RBC QN AUTO: 31.5 PG (ref 26–33)
MCH RBC QN AUTO: 31.7 PG (ref 26–33)
MCH RBC QN AUTO: 31.8 PG (ref 26–33)
MCH RBC QN AUTO: 31.9 PG (ref 26–33)
MCH RBC QN AUTO: 32 PG (ref 26–33)
MCHC RBC AUTO-ENTMCNC: 30.6 GM/DL (ref 32.2–35.5)
MCHC RBC AUTO-ENTMCNC: 31.1 GM/DL (ref 32.2–35.5)
MCHC RBC AUTO-ENTMCNC: 31.2 GM/DL (ref 32.2–35.5)
MCHC RBC AUTO-ENTMCNC: 31.2 GM/DL (ref 32.2–35.5)
MCHC RBC AUTO-ENTMCNC: 31.4 GM/DL (ref 32.2–35.5)
MCHC RBC AUTO-ENTMCNC: 31.6 GM/DL (ref 32.2–35.5)
MCHC RBC AUTO-ENTMCNC: 31.7 GM/DL (ref 32.2–35.5)
MCHC RBC AUTO-ENTMCNC: 31.8 GM/DL (ref 32.2–35.5)
MCHC RBC AUTO-ENTMCNC: 32 GM/DL (ref 32.2–35.5)
MCHC RBC AUTO-ENTMCNC: 32.5 GM/DL (ref 32.2–35.5)
MCHC RBC AUTO-ENTMCNC: 32.6 GM/DL (ref 32.2–35.5)
MCV RBC AUTO: 100 FL (ref 80–94)
MCV RBC AUTO: 100 FL (ref 80–94)
MCV RBC AUTO: 100.3 FL (ref 80–94)
MCV RBC AUTO: 100.7 FL (ref 80–94)
MCV RBC AUTO: 101 FL (ref 80–94)
MCV RBC AUTO: 102.1 FL (ref 80–94)
MCV RBC AUTO: 96.5 FL (ref 80–94)
MCV RBC AUTO: 98 FL (ref 80–94)
MCV RBC AUTO: 98.9 FL (ref 80–94)
MCV RBC AUTO: 99.7 FL (ref 80–94)
MCV RBC AUTO: 99.7 FL (ref 80–94)
MISCELLANEOUS 2: ABNORMAL
MISCELLANEOUS LAB TEST RESULT: ABNORMAL
MONOCYTES # BLD: 6.1 %
MONOCYTES # BLD: 7.8 %
MONOCYTES # BLD: 8.6 %
MONOCYTES # BLD: 8.9 %
MONOCYTES # BLD: 9.6 %
MONOCYTES ABSOLUTE: 0.6 THOU/MM3 (ref 0.4–1.3)
MONOCYTES ABSOLUTE: 0.7 THOU/MM3 (ref 0.4–1.3)
MONOCYTES ABSOLUTE: 0.8 THOU/MM3 (ref 0.4–1.3)
NITRITE, URINE: NEGATIVE
NITRITE, URINE: NEGATIVE
NUCLEATED RED BLOOD CELLS: 0 /100 WBC
O2 SATURATION: 86 %
O2 SATURATION: 94 %
O2 SATURATION: 96 %
ORGANISM: ABNORMAL
OSMOLALITY CALCULATION: 300.3 MOSMOL/KG (ref 275–300)
OSMOLALITY CALCULATION: 313.1 MOSMOL/KG (ref 275–300)
PCO2: 32 MMHG (ref 35–45)
PCO2: 33 MMHG (ref 35–45)
PCO2: 42 MMHG (ref 35–45)
PH BLOOD GAS: 7.04 (ref 7.35–7.45)
PH BLOOD GAS: 7.39 (ref 7.35–7.45)
PH BLOOD GAS: 7.45 (ref 7.35–7.45)
PH UA: 6.5 (ref 5–9)
PH UA: 7 (ref 5–9)
PHOSPHORUS: 4.4 MG/DL (ref 2.4–4.7)
PHOSPHORUS: 4.4 MG/DL (ref 2.4–4.7)
PHOSPHORUS: 4.5 MG/DL (ref 2.4–4.7)
PHOSPHORUS: 4.7 MG/DL (ref 2.4–4.7)
PHOSPHORUS: 4.9 MG/DL (ref 2.4–4.7)
PHOSPHORUS: 7.2 MG/DL (ref 2.4–4.7)
PLATELET # BLD: 210 THOU/MM3 (ref 130–400)
PLATELET # BLD: 254 THOU/MM3 (ref 130–400)
PLATELET # BLD: 271 THOU/MM3 (ref 130–400)
PLATELET # BLD: 277 THOU/MM3 (ref 130–400)
PLATELET # BLD: 292 THOU/MM3 (ref 130–400)
PLATELET # BLD: 307 THOU/MM3 (ref 130–400)
PLATELET # BLD: 311 THOU/MM3 (ref 130–400)
PLATELET # BLD: 312 THOU/MM3 (ref 130–400)
PLATELET # BLD: 321 THOU/MM3 (ref 130–400)
PLATELET # BLD: 333 THOU/MM3 (ref 130–400)
PLATELET # BLD: 339 THOU/MM3 (ref 130–400)
PLATELET # BLD: 362 THOU/MM3 (ref 130–400)
PMV BLD AUTO: 10.5 FL (ref 9.4–12.4)
PMV BLD AUTO: 9.2 FL (ref 9.4–12.4)
PMV BLD AUTO: 9.3 FL (ref 9.4–12.4)
PMV BLD AUTO: 9.4 FL (ref 9.4–12.4)
PMV BLD AUTO: 9.5 FL (ref 9.4–12.4)
PMV BLD AUTO: 9.5 FL (ref 9.4–12.4)
PMV BLD AUTO: 9.7 FL (ref 9.4–12.4)
PMV BLD AUTO: 9.7 FL (ref 9.4–12.4)
PMV BLD AUTO: 9.8 FL (ref 9.4–12.4)
PO2: 102 MMHG (ref 71–104)
PO2: 48 MMHG (ref 71–104)
PO2: 83 MMHG (ref 71–104)
POTASSIUM REFLEX MAGNESIUM: 4.4 MEQ/L (ref 3.5–5.2)
POTASSIUM SERPL-SCNC: 3.2 MEQ/L (ref 3.5–5.2)
POTASSIUM SERPL-SCNC: 3.8 MEQ/L (ref 3.5–5.2)
POTASSIUM SERPL-SCNC: 3.8 MEQ/L (ref 3.5–5.2)
POTASSIUM SERPL-SCNC: 3.9 MEQ/L (ref 3.5–5.2)
POTASSIUM SERPL-SCNC: 4 MEQ/L (ref 3.5–5.2)
POTASSIUM SERPL-SCNC: 4.1 MEQ/L (ref 3.5–5.2)
POTASSIUM SERPL-SCNC: 4.1 MEQ/L (ref 3.5–5.2)
POTASSIUM SERPL-SCNC: 4.3 MEQ/L (ref 3.5–5.2)
POTASSIUM SERPL-SCNC: 4.3 MEQ/L (ref 3.5–5.2)
POTASSIUM SERPL-SCNC: 4.4 MEQ/L (ref 3.5–5.2)
POTASSIUM SERPL-SCNC: 4.6 MEQ/L (ref 3.5–5.2)
POTASSIUM SERPL-SCNC: 4.6 MEQ/L (ref 3.5–5.2)
POTASSIUM SERPL-SCNC: 4.7 MEQ/L (ref 3.5–5.2)
POTASSIUM SERPL-SCNC: 4.9 MEQ/L (ref 3.5–5.2)
PRO-BNP: 9800 PG/ML (ref 0–1800)
PRO-BNP: ABNORMAL PG/ML (ref 0–1800)
PROCALCITONIN: 0.17 NG/ML (ref 0.01–0.09)
PROT/CREAT RATIO, UR: 1.69
PROTEIN UA: 100
PROTEIN UA: 100 MG/DL
PROTEIN, URINE: 182.9 MG/DL
PTH INTACT: 164.1 PG/ML (ref 15–65)
PTH INTACT: 185.6 PG/ML (ref 15–65)
PTH INTACT: 228.4 PG/ML (ref 15–65)
RBC # BLD: 2.66 MILL/MM3 (ref 4.7–6.1)
RBC # BLD: 2.74 MILL/MM3 (ref 4.7–6.1)
RBC # BLD: 2.75 MILL/MM3 (ref 4.7–6.1)
RBC # BLD: 2.85 MILL/MM3 (ref 4.7–6.1)
RBC # BLD: 2.89 MILL/MM3 (ref 4.7–6.1)
RBC # BLD: 2.89 MILL/MM3 (ref 4.7–6.1)
RBC # BLD: 2.92 MILL/MM3 (ref 4.7–6.1)
RBC # BLD: 3.11 MILL/MM3 (ref 4.7–6.1)
RBC # BLD: 3.19 MILL/MM3 (ref 4.7–6.1)
RBC # BLD: 3.26 MILL/MM3 (ref 4.7–6.1)
RBC # BLD: 3.42 MILL/MM3 (ref 4.7–6.1)
RBC URINE: ABNORMAL /HPF
RBC URINE: ABNORMAL /HPF
RENAL EPITHELIAL, UA: ABNORMAL
RENAL EPITHELIAL, UA: ABNORMAL
RESPIRATORY CULTURE: ABNORMAL
SEG NEUTROPHILS: 68.8 %
SEG NEUTROPHILS: 77.5 %
SEG NEUTROPHILS: 79.2 %
SEG NEUTROPHILS: 82.3 %
SEG NEUTROPHILS: 88.7 %
SEGMENTED NEUTROPHILS ABSOLUTE COUNT: 10.8 THOU/MM3 (ref 1.8–7.7)
SEGMENTED NEUTROPHILS ABSOLUTE COUNT: 5.5 THOU/MM3 (ref 1.8–7.7)
SEGMENTED NEUTROPHILS ABSOLUTE COUNT: 6.1 THOU/MM3 (ref 1.8–7.7)
SEGMENTED NEUTROPHILS ABSOLUTE COUNT: 6.4 THOU/MM3 (ref 1.8–7.7)
SEGMENTED NEUTROPHILS ABSOLUTE COUNT: 6.5 THOU/MM3 (ref 1.8–7.7)
SODIUM BLD-SCNC: 135 MEQ/L (ref 135–145)
SODIUM BLD-SCNC: 136 MEQ/L (ref 135–145)
SODIUM BLD-SCNC: 138 MEQ/L (ref 135–145)
SODIUM BLD-SCNC: 139 MEQ/L (ref 135–145)
SODIUM BLD-SCNC: 140 MEQ/L (ref 135–145)
SODIUM BLD-SCNC: 141 MEQ/L (ref 135–145)
SODIUM BLD-SCNC: 141 MEQ/L (ref 135–145)
SODIUM BLD-SCNC: 142 MEQ/L (ref 135–145)
SODIUM BLD-SCNC: 143 MEQ/L (ref 135–145)
SODIUM BLD-SCNC: 143 MEQ/L (ref 135–145)
SODIUM BLD-SCNC: 144 MEQ/L (ref 135–145)
SODIUM BLD-SCNC: 145 MEQ/L (ref 135–145)
SODIUM BLD-SCNC: 146 MEQ/L (ref 135–145)
SOURCE, BLOOD GAS: ABNORMAL
SOURCE, BLOOD GAS: ABNORMAL
SPECIFIC GRAVITY UA: 1.01 (ref 1–1.03)
SPECIFIC GRAVITY, URINE: 1.01 (ref 1–1.03)
TOTAL IRON BINDING CAPACITY: 193 UG/DL (ref 171–450)
TOTAL IRON BINDING CAPACITY: 271 UG/DL (ref 171–450)
TOTAL IRON BINDING CAPACITY: 294 UG/DL (ref 171–450)
TOTAL PROTEIN: 5.2 G/DL (ref 6.1–8)
TOTAL PROTEIN: 5.8 G/DL (ref 6.1–8)
TOTAL PROTEIN: 6.5 G/DL (ref 6.1–8)
TOTAL PROTEIN: 6.6 G/DL (ref 6.1–8)
TRIGL SERPL-MCNC: 84 MG/DL (ref 0–199)
TROPONIN T: 0.05 NG/ML
TROPONIN T: 0.08 NG/ML
TROPONIN T: 0.11 NG/ML
TROPONIN T: 0.87 NG/ML
TROPONIN T: 1.01 NG/ML
TROPONIN T: 1.04 NG/ML
TROPONIN T: 1.3 NG/ML
UROBILINOGEN, URINE: 0.2 EU/DL (ref 0–1)
UROBILINOGEN, URINE: 1 EU/DL (ref 0–1)
VITAMIN B-12: 567 PG/ML (ref 211–911)
VITAMIN D 25-HYDROXY: 27 NG/ML (ref 30–100)
VITAMIN D 25-HYDROXY: 30 NG/ML (ref 30–100)
VITAMIN D 25-HYDROXY: 32 NG/ML (ref 30–100)
WBC # BLD: 10.4 THOU/MM3 (ref 4.8–10.8)
WBC # BLD: 12.2 THOU/MM3 (ref 4.8–10.8)
WBC # BLD: 12.7 THOU/MM3 (ref 4.8–10.8)
WBC # BLD: 15.9 THOU/MM3 (ref 4.8–10.8)
WBC # BLD: 17 THOU/MM3 (ref 4.8–10.8)
WBC # BLD: 20.5 THOU/MM3 (ref 4.8–10.8)
WBC # BLD: 7.8 THOU/MM3 (ref 4.8–10.8)
WBC # BLD: 7.9 THOU/MM3 (ref 4.8–10.8)
WBC # BLD: 8 THOU/MM3 (ref 4.8–10.8)
WBC # BLD: 8.2 THOU/MM3 (ref 4.8–10.8)
WBC # BLD: 9.4 THOU/MM3 (ref 4.8–10.8)
WBC UA: ABNORMAL /HPF
WBC UA: ABNORMAL /HPF
YEAST: ABNORMAL
YEAST: ABNORMAL

## 2019-01-01 PROCEDURE — 36556 INSERT NON-TUNNEL CV CATH: CPT | Performed by: INTERNAL MEDICINE

## 2019-01-01 PROCEDURE — 84484 ASSAY OF TROPONIN QUANT: CPT

## 2019-01-01 PROCEDURE — 6360000002 HC RX W HCPCS: Performed by: INTERNAL MEDICINE

## 2019-01-01 PROCEDURE — 93010 ELECTROCARDIOGRAM REPORT: CPT | Performed by: NUCLEAR MEDICINE

## 2019-01-01 PROCEDURE — 5A1935Z RESPIRATORY VENTILATION, LESS THAN 24 CONSECUTIVE HOURS: ICD-10-PCS | Performed by: INTERNAL MEDICINE

## 2019-01-01 PROCEDURE — 86706 HEP B SURFACE ANTIBODY: CPT

## 2019-01-01 PROCEDURE — 80048 BASIC METABOLIC PNL TOTAL CA: CPT

## 2019-01-01 PROCEDURE — 6370000000 HC RX 637 (ALT 250 FOR IP): Performed by: NURSE PRACTITIONER

## 2019-01-01 PROCEDURE — 82962 GLUCOSE BLOOD TEST: CPT | Performed by: EMERGENCY MEDICINE

## 2019-01-01 PROCEDURE — 2500000003 HC RX 250 WO HCPCS: Performed by: NUCLEAR MEDICINE

## 2019-01-01 PROCEDURE — 1123F ACP DISCUSS/DSCN MKR DOCD: CPT | Performed by: NURSE PRACTITIONER

## 2019-01-01 PROCEDURE — 99232 SBSQ HOSP IP/OBS MODERATE 35: CPT | Performed by: INTERNAL MEDICINE

## 2019-01-01 PROCEDURE — C1769 GUIDE WIRE: HCPCS

## 2019-01-01 PROCEDURE — 2500000003 HC RX 250 WO HCPCS: Performed by: INTERNAL MEDICINE

## 2019-01-01 PROCEDURE — 84156 ASSAY OF PROTEIN URINE: CPT

## 2019-01-01 PROCEDURE — 6370000000 HC RX 637 (ALT 250 FOR IP): Performed by: INTERNAL MEDICINE

## 2019-01-01 PROCEDURE — 1036F TOBACCO NON-USER: CPT | Performed by: NURSE PRACTITIONER

## 2019-01-01 PROCEDURE — 99223 1ST HOSP IP/OBS HIGH 75: CPT | Performed by: NURSE PRACTITIONER

## 2019-01-01 PROCEDURE — 6360000002 HC RX W HCPCS: Performed by: EMERGENCY MEDICINE

## 2019-01-01 PROCEDURE — 85027 COMPLETE CBC AUTOMATED: CPT

## 2019-01-01 PROCEDURE — 94640 AIRWAY INHALATION TREATMENT: CPT

## 2019-01-01 PROCEDURE — G8420 CALC BMI NORM PARAMETERS: HCPCS | Performed by: NURSE PRACTITIONER

## 2019-01-01 PROCEDURE — 36415 COLL VENOUS BLD VENIPUNCTURE: CPT

## 2019-01-01 PROCEDURE — 2580000003 HC RX 258: Performed by: INTERNAL MEDICINE

## 2019-01-01 PROCEDURE — G8598 ASA/ANTIPLAT THER USED: HCPCS | Performed by: EMERGENCY MEDICINE

## 2019-01-01 PROCEDURE — C9113 INJ PANTOPRAZOLE SODIUM, VIA: HCPCS | Performed by: INTERNAL MEDICINE

## 2019-01-01 PROCEDURE — 82948 REAGENT STRIP/BLOOD GLUCOSE: CPT

## 2019-01-01 PROCEDURE — G8420 CALC BMI NORM PARAMETERS: HCPCS | Performed by: EMERGENCY MEDICINE

## 2019-01-01 PROCEDURE — 93005 ELECTROCARDIOGRAM TRACING: CPT | Performed by: EMERGENCY MEDICINE

## 2019-01-01 PROCEDURE — 2709999900 HC NON-CHARGEABLE SUPPLY

## 2019-01-01 PROCEDURE — 6360000002 HC RX W HCPCS: Performed by: NURSE PRACTITIONER

## 2019-01-01 PROCEDURE — G8427 DOCREV CUR MEDS BY ELIG CLIN: HCPCS | Performed by: NURSE PRACTITIONER

## 2019-01-01 PROCEDURE — G8598 ASA/ANTIPLAT THER USED: HCPCS | Performed by: INTERNAL MEDICINE

## 2019-01-01 PROCEDURE — 92950 HEART/LUNG RESUSCITATION CPR: CPT

## 2019-01-01 PROCEDURE — 1123F ACP DISCUSS/DSCN MKR DOCD: CPT | Performed by: EMERGENCY MEDICINE

## 2019-01-01 PROCEDURE — 6360000002 HC RX W HCPCS

## 2019-01-01 PROCEDURE — 82746 ASSAY OF FOLIC ACID SERUM: CPT

## 2019-01-01 PROCEDURE — 96365 THER/PROPH/DIAG IV INF INIT: CPT

## 2019-01-01 PROCEDURE — 4040F PNEUMOC VAC/ADMIN/RCVD: CPT | Performed by: EMERGENCY MEDICINE

## 2019-01-01 PROCEDURE — 4040F PNEUMOC VAC/ADMIN/RCVD: CPT | Performed by: INTERNAL MEDICINE

## 2019-01-01 PROCEDURE — 82248 BILIRUBIN DIRECT: CPT

## 2019-01-01 PROCEDURE — G8420 CALC BMI NORM PARAMETERS: HCPCS | Performed by: PHYSICIAN ASSISTANT

## 2019-01-01 PROCEDURE — 83880 ASSAY OF NATRIURETIC PEPTIDE: CPT

## 2019-01-01 PROCEDURE — 83970 ASSAY OF PARATHORMONE: CPT

## 2019-01-01 PROCEDURE — 93005 ELECTROCARDIOGRAM TRACING: CPT | Performed by: INTERNAL MEDICINE

## 2019-01-01 PROCEDURE — 71045 X-RAY EXAM CHEST 1 VIEW: CPT

## 2019-01-01 PROCEDURE — G8427 DOCREV CUR MEDS BY ELIG CLIN: HCPCS | Performed by: EMERGENCY MEDICINE

## 2019-01-01 PROCEDURE — 4040F PNEUMOC VAC/ADMIN/RCVD: CPT | Performed by: FAMILY MEDICINE

## 2019-01-01 PROCEDURE — 1111F DSCHRG MED/CURRENT MED MERGE: CPT | Performed by: PHYSICIAN ASSISTANT

## 2019-01-01 PROCEDURE — G8420 CALC BMI NORM PARAMETERS: HCPCS | Performed by: INTERNAL MEDICINE

## 2019-01-01 PROCEDURE — 83735 ASSAY OF MAGNESIUM: CPT

## 2019-01-01 PROCEDURE — 1200000003 HC TELEMETRY R&B

## 2019-01-01 PROCEDURE — 99233 SBSQ HOSP IP/OBS HIGH 50: CPT | Performed by: INTERNAL MEDICINE

## 2019-01-01 PROCEDURE — 1036F TOBACCO NON-USER: CPT | Performed by: INTERNAL MEDICINE

## 2019-01-01 PROCEDURE — 90935 HEMODIALYSIS ONE EVALUATION: CPT

## 2019-01-01 PROCEDURE — 80053 COMPREHEN METABOLIC PANEL: CPT

## 2019-01-01 PROCEDURE — 86301 IMMUNOASSAY TUMOR CA 19-9: CPT

## 2019-01-01 PROCEDURE — 93010 ELECTROCARDIOGRAM REPORT: CPT | Performed by: INTERNAL MEDICINE

## 2019-01-01 PROCEDURE — 83036 HEMOGLOBIN GLYCOSYLATED A1C: CPT | Performed by: EMERGENCY MEDICINE

## 2019-01-01 PROCEDURE — 99222 1ST HOSP IP/OBS MODERATE 55: CPT | Performed by: INTERNAL MEDICINE

## 2019-01-01 PROCEDURE — 2700000000 HC OXYGEN THERAPY PER DAY

## 2019-01-01 PROCEDURE — 85347 COAGULATION TIME ACTIVATED: CPT

## 2019-01-01 PROCEDURE — 99213 OFFICE O/P EST LOW 20 MIN: CPT | Performed by: EMERGENCY MEDICINE

## 2019-01-01 PROCEDURE — 82607 VITAMIN B-12: CPT

## 2019-01-01 PROCEDURE — 4040F PNEUMOC VAC/ADMIN/RCVD: CPT | Performed by: PHYSICIAN ASSISTANT

## 2019-01-01 PROCEDURE — 85018 HEMOGLOBIN: CPT

## 2019-01-01 PROCEDURE — 2140000000 HC CCU INTERMEDIATE R&B

## 2019-01-01 PROCEDURE — 99232 SBSQ HOSP IP/OBS MODERATE 35: CPT | Performed by: NURSE PRACTITIONER

## 2019-01-01 PROCEDURE — 2500000003 HC RX 250 WO HCPCS: Performed by: NURSE PRACTITIONER

## 2019-01-01 PROCEDURE — 83540 ASSAY OF IRON: CPT

## 2019-01-01 PROCEDURE — C1894 INTRO/SHEATH, NON-LASER: HCPCS

## 2019-01-01 PROCEDURE — 4040F PNEUMOC VAC/ADMIN/RCVD: CPT | Performed by: NURSE PRACTITIONER

## 2019-01-01 PROCEDURE — 2580000003 HC RX 258: Performed by: NURSE PRACTITIONER

## 2019-01-01 PROCEDURE — 36600 WITHDRAWAL OF ARTERIAL BLOOD: CPT

## 2019-01-01 PROCEDURE — 99213 OFFICE O/P EST LOW 20 MIN: CPT | Performed by: NURSE PRACTITIONER

## 2019-01-01 PROCEDURE — G8420 CALC BMI NORM PARAMETERS: HCPCS | Performed by: FAMILY MEDICINE

## 2019-01-01 PROCEDURE — 1036F TOBACCO NON-USER: CPT | Performed by: EMERGENCY MEDICINE

## 2019-01-01 PROCEDURE — G8482 FLU IMMUNIZE ORDER/ADMIN: HCPCS | Performed by: NURSE PRACTITIONER

## 2019-01-01 PROCEDURE — 85025 COMPLETE CBC W/AUTO DIFF WBC: CPT

## 2019-01-01 PROCEDURE — 93000 ELECTROCARDIOGRAM COMPLETE: CPT | Performed by: NUCLEAR MEDICINE

## 2019-01-01 PROCEDURE — 87340 HEPATITIS B SURFACE AG IA: CPT

## 2019-01-01 PROCEDURE — 84100 ASSAY OF PHOSPHORUS: CPT

## 2019-01-01 PROCEDURE — C1887 CATHETER, GUIDING: HCPCS

## 2019-01-01 PROCEDURE — 82728 ASSAY OF FERRITIN: CPT

## 2019-01-01 PROCEDURE — 36556 INSERT NON-TUNNEL CV CATH: CPT

## 2019-01-01 PROCEDURE — 2500000003 HC RX 250 WO HCPCS

## 2019-01-01 PROCEDURE — G8427 DOCREV CUR MEDS BY ELIG CLIN: HCPCS | Performed by: NUCLEAR MEDICINE

## 2019-01-01 PROCEDURE — 90740 HEPB VACC 3 DOSE IMMUNSUP IM: CPT | Performed by: NURSE PRACTITIONER

## 2019-01-01 PROCEDURE — 82306 VITAMIN D 25 HYDROXY: CPT

## 2019-01-01 PROCEDURE — 85014 HEMATOCRIT: CPT

## 2019-01-01 PROCEDURE — 31500 INSERT EMERGENCY AIRWAY: CPT | Performed by: INTERNAL MEDICINE

## 2019-01-01 PROCEDURE — 85730 THROMBOPLASTIN TIME PARTIAL: CPT

## 2019-01-01 PROCEDURE — 94669 MECHANICAL CHEST WALL OSCILL: CPT

## 2019-01-01 PROCEDURE — 83550 IRON BINDING TEST: CPT

## 2019-01-01 PROCEDURE — 99285 EMERGENCY DEPT VISIT HI MDM: CPT

## 2019-01-01 PROCEDURE — 96372 THER/PROPH/DIAG INJ SC/IM: CPT

## 2019-01-01 PROCEDURE — 94640 AIRWAY INHALATION TREATMENT: CPT | Performed by: EMERGENCY MEDICINE

## 2019-01-01 PROCEDURE — 51702 INSERT TEMP BLADDER CATH: CPT

## 2019-01-01 PROCEDURE — 2580000003 HC RX 258: Performed by: EMERGENCY MEDICINE

## 2019-01-01 PROCEDURE — 1111F DSCHRG MED/CURRENT MED MERGE: CPT | Performed by: NURSE PRACTITIONER

## 2019-01-01 PROCEDURE — 94760 N-INVAS EAR/PLS OXIMETRY 1: CPT

## 2019-01-01 PROCEDURE — 99214 OFFICE O/P EST MOD 30 MIN: CPT | Performed by: EMERGENCY MEDICINE

## 2019-01-01 PROCEDURE — 1111F DSCHRG MED/CURRENT MED MERGE: CPT | Performed by: EMERGENCY MEDICINE

## 2019-01-01 PROCEDURE — B2111ZZ FLUOROSCOPY OF MULTIPLE CORONARY ARTERIES USING LOW OSMOLAR CONTRAST: ICD-10-PCS | Performed by: INTERNAL MEDICINE

## 2019-01-01 PROCEDURE — 1111F DSCHRG MED/CURRENT MED MERGE: CPT | Performed by: PHYSICAL MEDICINE & REHABILITATION

## 2019-01-01 PROCEDURE — 99214 OFFICE O/P EST MOD 30 MIN: CPT | Performed by: INTERNAL MEDICINE

## 2019-01-01 PROCEDURE — G8427 DOCREV CUR MEDS BY ELIG CLIN: HCPCS | Performed by: INTERNAL MEDICINE

## 2019-01-01 PROCEDURE — G8482 FLU IMMUNIZE ORDER/ADMIN: HCPCS | Performed by: NUCLEAR MEDICINE

## 2019-01-01 PROCEDURE — 1101F PT FALLS ASSESS-DOCD LE1/YR: CPT | Performed by: NUCLEAR MEDICINE

## 2019-01-01 PROCEDURE — 94761 N-INVAS EAR/PLS OXIMETRY MLT: CPT

## 2019-01-01 PROCEDURE — G8420 CALC BMI NORM PARAMETERS: HCPCS | Performed by: NUCLEAR MEDICINE

## 2019-01-01 PROCEDURE — 87070 CULTURE OTHR SPECIMN AEROBIC: CPT

## 2019-01-01 PROCEDURE — 81001 URINALYSIS AUTO W/SCOPE: CPT

## 2019-01-01 PROCEDURE — 93307 TTE W/O DOPPLER COMPLETE: CPT

## 2019-01-01 PROCEDURE — C1725 CATH, TRANSLUMIN NON-LASER: HCPCS

## 2019-01-01 PROCEDURE — G8598 ASA/ANTIPLAT THER USED: HCPCS | Performed by: PHYSICIAN ASSISTANT

## 2019-01-01 PROCEDURE — 82803 BLOOD GASES ANY COMBINATION: CPT

## 2019-01-01 PROCEDURE — G8427 DOCREV CUR MEDS BY ELIG CLIN: HCPCS | Performed by: FAMILY MEDICINE

## 2019-01-01 PROCEDURE — 2000000000 HC ICU R&B

## 2019-01-01 PROCEDURE — 99291 CRITICAL CARE FIRST HOUR: CPT | Performed by: INTERNAL MEDICINE

## 2019-01-01 PROCEDURE — 2060000000 HC ICU INTERMEDIATE R&B

## 2019-01-01 PROCEDURE — 82570 ASSAY OF URINE CREATININE: CPT

## 2019-01-01 PROCEDURE — 93005 ELECTROCARDIOGRAM TRACING: CPT | Performed by: NURSE PRACTITIONER

## 2019-01-01 PROCEDURE — B2151ZZ FLUOROSCOPY OF LEFT HEART USING LOW OSMOLAR CONTRAST: ICD-10-PCS | Performed by: INTERNAL MEDICINE

## 2019-01-01 PROCEDURE — 99223 1ST HOSP IP/OBS HIGH 75: CPT | Performed by: NUCLEAR MEDICINE

## 2019-01-01 PROCEDURE — 1123F ACP DISCUSS/DSCN MKR DOCD: CPT | Performed by: FAMILY MEDICINE

## 2019-01-01 PROCEDURE — 51798 US URINE CAPACITY MEASURE: CPT

## 2019-01-01 PROCEDURE — G8598 ASA/ANTIPLAT THER USED: HCPCS | Performed by: NUCLEAR MEDICINE

## 2019-01-01 PROCEDURE — 1123F ACP DISCUSS/DSCN MKR DOCD: CPT | Performed by: INTERNAL MEDICINE

## 2019-01-01 PROCEDURE — G8482 FLU IMMUNIZE ORDER/ADMIN: HCPCS | Performed by: INTERNAL MEDICINE

## 2019-01-01 PROCEDURE — 80061 LIPID PANEL: CPT

## 2019-01-01 PROCEDURE — 82330 ASSAY OF CALCIUM: CPT

## 2019-01-01 PROCEDURE — 87040 BLOOD CULTURE FOR BACTERIA: CPT

## 2019-01-01 PROCEDURE — 96376 TX/PRO/DX INJ SAME DRUG ADON: CPT

## 2019-01-01 PROCEDURE — 99213 OFFICE O/P EST LOW 20 MIN: CPT | Performed by: FAMILY MEDICINE

## 2019-01-01 PROCEDURE — G8598 ASA/ANTIPLAT THER USED: HCPCS | Performed by: NURSE PRACTITIONER

## 2019-01-01 PROCEDURE — 1036F TOBACCO NON-USER: CPT | Performed by: FAMILY MEDICINE

## 2019-01-01 PROCEDURE — 99221 1ST HOSP IP/OBS SF/LOW 40: CPT | Performed by: INTERNAL MEDICINE

## 2019-01-01 PROCEDURE — 1101F PT FALLS ASSESS-DOCD LE1/YR: CPT | Performed by: EMERGENCY MEDICINE

## 2019-01-01 PROCEDURE — 90935 HEMODIALYSIS ONE EVALUATION: CPT | Performed by: INTERNAL MEDICINE

## 2019-01-01 PROCEDURE — 1036F TOBACCO NON-USER: CPT | Performed by: PHYSICIAN ASSISTANT

## 2019-01-01 PROCEDURE — 83605 ASSAY OF LACTIC ACID: CPT

## 2019-01-01 PROCEDURE — G0010 ADMIN HEPATITIS B VACCINE: HCPCS | Performed by: NURSE PRACTITIONER

## 2019-01-01 PROCEDURE — 4A023N7 MEASUREMENT OF CARDIAC SAMPLING AND PRESSURE, LEFT HEART, PERCUTANEOUS APPROACH: ICD-10-PCS | Performed by: INTERNAL MEDICINE

## 2019-01-01 PROCEDURE — 94660 CPAP INITIATION&MGMT: CPT

## 2019-01-01 PROCEDURE — 99214 OFFICE O/P EST MOD 30 MIN: CPT | Performed by: NUCLEAR MEDICINE

## 2019-01-01 PROCEDURE — 99214 OFFICE O/P EST MOD 30 MIN: CPT | Performed by: NURSE PRACTITIONER

## 2019-01-01 PROCEDURE — 84145 PROCALCITONIN (PCT): CPT

## 2019-01-01 PROCEDURE — 87205 SMEAR GRAM STAIN: CPT

## 2019-01-01 PROCEDURE — 93005 ELECTROCARDIOGRAM TRACING: CPT | Performed by: PHYSICIAN ASSISTANT

## 2019-01-01 PROCEDURE — 02H633Z INSERTION OF INFUSION DEVICE INTO RIGHT ATRIUM, PERCUTANEOUS APPROACH: ICD-10-PCS | Performed by: INTERNAL MEDICINE

## 2019-01-01 PROCEDURE — 6370000000 HC RX 637 (ALT 250 FOR IP): Performed by: PHYSICIAN ASSISTANT

## 2019-01-01 PROCEDURE — 86705 HEP B CORE ANTIBODY IGM: CPT

## 2019-01-01 PROCEDURE — 6360000004 HC RX CONTRAST MEDICATION: Performed by: INTERNAL MEDICINE

## 2019-01-01 PROCEDURE — 1036F TOBACCO NON-USER: CPT | Performed by: NUCLEAR MEDICINE

## 2019-01-01 PROCEDURE — 93458 L HRT ARTERY/VENTRICLE ANGIO: CPT

## 2019-01-01 PROCEDURE — 93306 TTE W/DOPPLER COMPLETE: CPT

## 2019-01-01 PROCEDURE — C1751 CATH, INF, PER/CENT/MIDLINE: HCPCS

## 2019-01-01 PROCEDURE — 77001 FLUOROGUIDE FOR VEIN DEVICE: CPT

## 2019-01-01 PROCEDURE — 99213 OFFICE O/P EST LOW 20 MIN: CPT | Performed by: PHYSICIAN ASSISTANT

## 2019-01-01 PROCEDURE — C1752 CATH,HEMODIALYSIS,SHORT-TERM: HCPCS

## 2019-01-01 PROCEDURE — 84460 ALANINE AMINO (ALT) (SGPT): CPT

## 2019-01-01 PROCEDURE — 84450 TRANSFERASE (AST) (SGOT): CPT

## 2019-01-01 PROCEDURE — 99223 1ST HOSP IP/OBS HIGH 75: CPT | Performed by: INTERNAL MEDICINE

## 2019-01-01 PROCEDURE — 02HV33Z INSERTION OF INFUSION DEVICE INTO SUPERIOR VENA CAVA, PERCUTANEOUS APPROACH: ICD-10-PCS | Performed by: INTERNAL MEDICINE

## 2019-01-01 PROCEDURE — 99213 OFFICE O/P EST LOW 20 MIN: CPT | Performed by: INTERNAL MEDICINE

## 2019-01-01 PROCEDURE — 1123F ACP DISCUSS/DSCN MKR DOCD: CPT | Performed by: NUCLEAR MEDICINE

## 2019-01-01 PROCEDURE — 84132 ASSAY OF SERUM POTASSIUM: CPT

## 2019-01-01 PROCEDURE — 94002 VENT MGMT INPAT INIT DAY: CPT

## 2019-01-01 PROCEDURE — 76937 US GUIDE VASCULAR ACCESS: CPT

## 2019-01-01 PROCEDURE — 4040F PNEUMOC VAC/ADMIN/RCVD: CPT | Performed by: NUCLEAR MEDICINE

## 2019-01-01 PROCEDURE — G8598 ASA/ANTIPLAT THER USED: HCPCS | Performed by: FAMILY MEDICINE

## 2019-01-01 PROCEDURE — 0BH18EZ INSERTION OF ENDOTRACHEAL AIRWAY INTO TRACHEA, VIA NATURAL OR ARTIFICIAL OPENING ENDOSCOPIC: ICD-10-PCS | Performed by: INTERNAL MEDICINE

## 2019-01-01 PROCEDURE — G8428 CUR MEDS NOT DOCUMENT: HCPCS | Performed by: PHYSICIAN ASSISTANT

## 2019-01-01 PROCEDURE — 1101F PT FALLS ASSESS-DOCD LE1/YR: CPT | Performed by: INTERNAL MEDICINE

## 2019-01-01 PROCEDURE — 2500000003 HC RX 250 WO HCPCS: Performed by: EMERGENCY MEDICINE

## 2019-01-01 PROCEDURE — 1123F ACP DISCUSS/DSCN MKR DOCD: CPT | Performed by: PHYSICIAN ASSISTANT

## 2019-01-01 PROCEDURE — 85049 AUTOMATED PLATELET COUNT: CPT

## 2019-01-01 PROCEDURE — 6370000000 HC RX 637 (ALT 250 FOR IP)

## 2019-01-01 RX ORDER — HYDRALAZINE HYDROCHLORIDE 50 MG/1
50 TABLET, FILM COATED ORAL 3 TIMES DAILY
Qty: 90 TABLET | Refills: 3 | Status: SHIPPED | OUTPATIENT
Start: 2019-01-01 | End: 2019-01-01

## 2019-01-01 RX ORDER — NITROGLYCERIN 20 MG/100ML
10 INJECTION INTRAVENOUS CONTINUOUS
Status: DISCONTINUED | OUTPATIENT
Start: 2019-01-01 | End: 2019-01-01

## 2019-01-01 RX ORDER — COSYNTROPIN 0.25 MG/ML
250 INJECTION, POWDER, FOR SOLUTION INTRAMUSCULAR; INTRAVENOUS ONCE
Status: DISCONTINUED | OUTPATIENT
Start: 2019-01-01 | End: 2019-01-01

## 2019-01-01 RX ORDER — GLIMEPIRIDE 1 MG/1
1 TABLET ORAL
Qty: 90 TABLET | Refills: 1 | Status: SHIPPED | OUTPATIENT
Start: 2019-01-01 | End: 2019-01-01 | Stop reason: DRUGHIGH

## 2019-01-01 RX ORDER — LEVOFLOXACIN 5 MG/ML
500 INJECTION, SOLUTION INTRAVENOUS EVERY 24 HOURS
Status: DISCONTINUED | OUTPATIENT
Start: 2019-01-01 | End: 2019-01-01 | Stop reason: DRUGHIGH

## 2019-01-01 RX ORDER — ISOSORBIDE MONONITRATE 30 MG/1
30 TABLET, EXTENDED RELEASE ORAL DAILY
Qty: 30 TABLET | Refills: 3 | Status: SHIPPED | OUTPATIENT
Start: 2019-01-01 | End: 2019-01-01 | Stop reason: DRUGHIGH

## 2019-01-01 RX ORDER — ONDANSETRON 2 MG/ML
4 INJECTION INTRAMUSCULAR; INTRAVENOUS EVERY 6 HOURS PRN
Status: DISCONTINUED | OUTPATIENT
Start: 2019-01-01 | End: 2019-01-01 | Stop reason: HOSPADM

## 2019-01-01 RX ORDER — HYDRALAZINE HYDROCHLORIDE 20 MG/ML
10 INJECTION INTRAMUSCULAR; INTRAVENOUS EVERY 4 HOURS PRN
Status: DISCONTINUED | OUTPATIENT
Start: 2019-01-01 | End: 2019-01-01

## 2019-01-01 RX ORDER — DEXAMETHASONE SODIUM PHOSPHATE 4 MG/ML
4 INJECTION, SOLUTION INTRA-ARTICULAR; INTRALESIONAL; INTRAMUSCULAR; INTRAVENOUS; SOFT TISSUE ONCE
Status: DISCONTINUED | OUTPATIENT
Start: 2019-01-01 | End: 2019-01-01

## 2019-01-01 RX ORDER — METOPROLOL SUCCINATE 100 MG/1
100 TABLET, EXTENDED RELEASE ORAL EVERY MORNING
Status: DISCONTINUED | OUTPATIENT
Start: 2019-01-01 | End: 2019-01-01 | Stop reason: HOSPADM

## 2019-01-01 RX ORDER — METOPROLOL SUCCINATE 100 MG/1
100 TABLET, EXTENDED RELEASE ORAL EVERY MORNING
Status: DISCONTINUED | OUTPATIENT
Start: 2019-01-01 | End: 2019-01-01

## 2019-01-01 RX ORDER — GLIMEPIRIDE 1 MG/1
1 TABLET ORAL
Status: CANCELLED | OUTPATIENT
Start: 2019-01-01

## 2019-01-01 RX ORDER — CALCIUM CARBONATE 200(500)MG
500 TABLET,CHEWABLE ORAL 3 TIMES DAILY PRN
Status: DISCONTINUED | OUTPATIENT
Start: 2019-01-01 | End: 2019-01-01

## 2019-01-01 RX ORDER — MORPHINE SULFATE 2 MG/ML
2 INJECTION, SOLUTION INTRAMUSCULAR; INTRAVENOUS
Status: ACTIVE | OUTPATIENT
Start: 2019-01-01 | End: 2019-01-01

## 2019-01-01 RX ORDER — FLUCONAZOLE 200 MG/1
400 TABLET ORAL DAILY
Status: DISCONTINUED | OUTPATIENT
Start: 2019-01-01 | End: 2019-01-01 | Stop reason: DRUGHIGH

## 2019-01-01 RX ORDER — DEXTROSE MONOHYDRATE 25 G/50ML
12.5 INJECTION, SOLUTION INTRAVENOUS PRN
Status: DISCONTINUED | OUTPATIENT
Start: 2019-01-01 | End: 2019-01-01

## 2019-01-01 RX ORDER — ONDANSETRON 2 MG/ML
4 INJECTION INTRAMUSCULAR; INTRAVENOUS EVERY 6 HOURS PRN
Status: DISCONTINUED | OUTPATIENT
Start: 2019-01-01 | End: 2019-01-01

## 2019-01-01 RX ORDER — CLOPIDOGREL BISULFATE 75 MG/1
75 TABLET ORAL DAILY
Qty: 90 TABLET | Refills: 1 | Status: SHIPPED | OUTPATIENT
Start: 2019-01-01 | End: 2019-01-01

## 2019-01-01 RX ORDER — VERAPAMIL HYDROCHLORIDE 120 MG/1
120 TABLET, FILM COATED ORAL DAILY
Qty: 90 TABLET | Refills: 0 | Status: SHIPPED | OUTPATIENT
Start: 2019-01-01 | End: 2019-01-01 | Stop reason: SDUPTHER

## 2019-01-01 RX ORDER — PANTOPRAZOLE SODIUM 40 MG/10ML
40 INJECTION, POWDER, LYOPHILIZED, FOR SOLUTION INTRAVENOUS DAILY
Status: DISCONTINUED | OUTPATIENT
Start: 2019-01-01 | End: 2019-01-01

## 2019-01-01 RX ORDER — BUMETANIDE 0.25 MG/ML
2 INJECTION, SOLUTION INTRAMUSCULAR; INTRAVENOUS 2 TIMES DAILY
Status: DISCONTINUED | OUTPATIENT
Start: 2019-01-01 | End: 2019-01-01 | Stop reason: RX

## 2019-01-01 RX ORDER — IPRATROPIUM BROMIDE AND ALBUTEROL SULFATE 2.5; .5 MG/3ML; MG/3ML
1 SOLUTION RESPIRATORY (INHALATION) EVERY 4 HOURS
Status: DISCONTINUED | OUTPATIENT
Start: 2019-01-01 | End: 2019-01-01

## 2019-01-01 RX ORDER — FUROSEMIDE 10 MG/ML
40 INJECTION INTRAMUSCULAR; INTRAVENOUS ONCE
Status: COMPLETED | OUTPATIENT
Start: 2019-01-01 | End: 2019-01-01

## 2019-01-01 RX ORDER — SODIUM CHLORIDE, SODIUM LACTATE, POTASSIUM CHLORIDE, CALCIUM CHLORIDE 600; 310; 30; 20 MG/100ML; MG/100ML; MG/100ML; MG/100ML
INJECTION, SOLUTION INTRAVENOUS CONTINUOUS
Status: DISCONTINUED | OUTPATIENT
Start: 2019-01-01 | End: 2019-01-01

## 2019-01-01 RX ORDER — VERAPAMIL HYDROCHLORIDE 120 MG/1
120 TABLET, FILM COATED ORAL DAILY
Qty: 30 TABLET | Refills: 3 | Status: SHIPPED | OUTPATIENT
Start: 2019-01-01 | End: 2019-01-01 | Stop reason: SDUPTHER

## 2019-01-01 RX ORDER — GLIMEPIRIDE 1 MG/1
0.5 TABLET ORAL
Qty: 90 TABLET | Refills: 1 | Status: SHIPPED
Start: 2019-01-01

## 2019-01-01 RX ORDER — NICOTINE POLACRILEX 4 MG
15 LOZENGE BUCCAL PRN
Status: DISCONTINUED | OUTPATIENT
Start: 2019-01-01 | End: 2019-01-01 | Stop reason: HOSPADM

## 2019-01-01 RX ORDER — VERAPAMIL HYDROCHLORIDE 120 MG/1
120 TABLET, FILM COATED ORAL DAILY
Status: DISCONTINUED | OUTPATIENT
Start: 2019-01-01 | End: 2019-01-01

## 2019-01-01 RX ORDER — DEXTROSE MONOHYDRATE 50 MG/ML
100 INJECTION, SOLUTION INTRAVENOUS PRN
Status: DISCONTINUED | OUTPATIENT
Start: 2019-01-01 | End: 2019-01-01 | Stop reason: HOSPADM

## 2019-01-01 RX ORDER — FLUCONAZOLE 200 MG/1
200 TABLET ORAL DAILY
Status: DISCONTINUED | OUTPATIENT
Start: 2019-01-01 | End: 2019-01-01

## 2019-01-01 RX ORDER — HEPARIN SODIUM 1000 [USP'U]/ML
60 INJECTION, SOLUTION INTRAVENOUS; SUBCUTANEOUS ONCE
Status: DISCONTINUED | OUTPATIENT
Start: 2019-01-01 | End: 2019-01-01

## 2019-01-01 RX ORDER — METOPROLOL TARTRATE 5 MG/5ML
INJECTION INTRAVENOUS
Status: DISPENSED
Start: 2019-01-01 | End: 2019-01-01

## 2019-01-01 RX ORDER — ACETAMINOPHEN 325 MG/1
650 TABLET ORAL EVERY 4 HOURS PRN
Status: DISCONTINUED | OUTPATIENT
Start: 2019-01-01 | End: 2019-01-01 | Stop reason: SDUPTHER

## 2019-01-01 RX ORDER — LEVOFLOXACIN 250 MG/1
250 TABLET ORAL DAILY
COMMUNITY

## 2019-01-01 RX ORDER — METOLAZONE 5 MG/1
TABLET ORAL
Qty: 6 TABLET | Refills: 0 | Status: ON HOLD | OUTPATIENT
Start: 2019-01-01 | End: 2019-01-01 | Stop reason: ALTCHOICE

## 2019-01-01 RX ORDER — LORAZEPAM 2 MG/ML
1 INJECTION INTRAMUSCULAR ONCE
Status: COMPLETED | OUTPATIENT
Start: 2019-01-01 | End: 2019-01-01

## 2019-01-01 RX ORDER — ISOSORBIDE MONONITRATE 60 MG/1
60 TABLET, EXTENDED RELEASE ORAL DAILY
Status: DISCONTINUED | OUTPATIENT
Start: 2019-01-01 | End: 2019-01-01

## 2019-01-01 RX ORDER — LORAZEPAM 2 MG/ML
INJECTION INTRAMUSCULAR
Status: DISCONTINUED
Start: 2019-01-01 | End: 2019-01-01

## 2019-01-01 RX ORDER — GLIMEPIRIDE 1 MG/1
1 TABLET ORAL
Status: DISCONTINUED | OUTPATIENT
Start: 2019-01-01 | End: 2019-01-01

## 2019-01-01 RX ORDER — PROPOFOL 10 MG/ML
INJECTION, EMULSION INTRAVENOUS
Status: DISCONTINUED
Start: 2019-01-01 | End: 2019-01-01

## 2019-01-01 RX ORDER — PROPOFOL 10 MG/ML
10 INJECTION, EMULSION INTRAVENOUS
Status: DISCONTINUED | OUTPATIENT
Start: 2019-01-01 | End: 2019-01-01

## 2019-01-01 RX ORDER — LEVOFLOXACIN 5 MG/ML
500 INJECTION, SOLUTION INTRAVENOUS ONCE
Status: COMPLETED | OUTPATIENT
Start: 2019-01-01 | End: 2019-01-01

## 2019-01-01 RX ORDER — NITROGLYCERIN 20 MG/100ML
5 INJECTION INTRAVENOUS CONTINUOUS
Status: DISCONTINUED | OUTPATIENT
Start: 2019-01-01 | End: 2019-01-01

## 2019-01-01 RX ORDER — ATORVASTATIN CALCIUM 10 MG/1
10 TABLET, FILM COATED ORAL DAILY
Qty: 90 TABLET | Refills: 1 | Status: SHIPPED | OUTPATIENT
Start: 2019-01-01 | End: 2019-01-01 | Stop reason: SDUPTHER

## 2019-01-01 RX ORDER — FUROSEMIDE 10 MG/ML
20 INJECTION INTRAMUSCULAR; INTRAVENOUS ONCE
Status: COMPLETED | OUTPATIENT
Start: 2019-01-01 | End: 2019-01-01

## 2019-01-01 RX ORDER — HYDRALAZINE HYDROCHLORIDE 50 MG/1
50 TABLET, FILM COATED ORAL 3 TIMES DAILY
Qty: 135 TABLET | Refills: 3 | Status: SHIPPED | OUTPATIENT
Start: 2019-01-01

## 2019-01-01 RX ORDER — VERAPAMIL HYDROCHLORIDE 120 MG/1
120 TABLET, FILM COATED ORAL DAILY
Qty: 90 TABLET | Refills: 1 | Status: SHIPPED | OUTPATIENT
Start: 2019-01-01 | End: 2019-01-01 | Stop reason: ALTCHOICE

## 2019-01-01 RX ORDER — LORAZEPAM 0.5 MG/1
0.5 TABLET ORAL NIGHTLY PRN
Status: DISCONTINUED | OUTPATIENT
Start: 2019-01-01 | End: 2019-01-01

## 2019-01-01 RX ORDER — SODIUM BICARBONATE 650 MG/1
1300 TABLET ORAL 3 TIMES DAILY
COMMUNITY
End: 2019-01-01 | Stop reason: SDUPTHER

## 2019-01-01 RX ORDER — CLOPIDOGREL BISULFATE 75 MG/1
75 TABLET ORAL DAILY
Status: DISCONTINUED | OUTPATIENT
Start: 2019-01-01 | End: 2019-01-01

## 2019-01-01 RX ORDER — BUDESONIDE AND FORMOTEROL FUMARATE DIHYDRATE 160; 4.5 UG/1; UG/1
2 AEROSOL RESPIRATORY (INHALATION) 2 TIMES DAILY
Qty: 1 INHALER | Refills: 3 | COMMUNITY
Start: 2019-01-01

## 2019-01-01 RX ORDER — SODIUM CHLORIDE 0.9 % (FLUSH) 0.9 %
10 SYRINGE (ML) INJECTION EVERY 12 HOURS SCHEDULED
Status: DISCONTINUED | OUTPATIENT
Start: 2019-01-01 | End: 2019-01-01 | Stop reason: SDUPTHER

## 2019-01-01 RX ORDER — SODIUM BICARBONATE 650 MG/1
1300 TABLET ORAL 3 TIMES DAILY
Qty: 180 TABLET | Refills: 0 | Status: SHIPPED | OUTPATIENT
Start: 2019-01-01 | End: 2019-01-01

## 2019-01-01 RX ORDER — CLINDAMYCIN PHOSPHATE 600 MG/50ML
600 INJECTION INTRAVENOUS EVERY 8 HOURS
Status: DISCONTINUED | OUTPATIENT
Start: 2019-01-01 | End: 2019-01-01

## 2019-01-01 RX ORDER — METOPROLOL SUCCINATE 100 MG/1
100 TABLET, EXTENDED RELEASE ORAL EVERY MORNING
Qty: 30 TABLET | Refills: 3 | Status: SHIPPED | OUTPATIENT
Start: 2019-01-01 | End: 2019-01-01 | Stop reason: SDUPTHER

## 2019-01-01 RX ORDER — FUROSEMIDE 10 MG/ML
80 INJECTION INTRAMUSCULAR; INTRAVENOUS 2 TIMES DAILY
Status: DISCONTINUED | OUTPATIENT
Start: 2019-01-01 | End: 2019-01-01

## 2019-01-01 RX ORDER — DOPAMINE HYDROCHLORIDE 160 MG/100ML
10 INJECTION, SOLUTION INTRAVENOUS CONTINUOUS
Status: DISCONTINUED | OUTPATIENT
Start: 2019-01-01 | End: 2019-01-01

## 2019-01-01 RX ORDER — ATROPINE SULFATE 0.1 MG/ML
INJECTION INTRAVENOUS
Status: DISCONTINUED
Start: 2019-01-01 | End: 2019-01-01

## 2019-01-01 RX ORDER — FUROSEMIDE 40 MG/1
40 TABLET ORAL DAILY
Status: DISCONTINUED | OUTPATIENT
Start: 2019-01-01 | End: 2019-01-01 | Stop reason: HOSPADM

## 2019-01-01 RX ORDER — NICOTINE POLACRILEX 4 MG
15 LOZENGE BUCCAL PRN
Status: DISCONTINUED | OUTPATIENT
Start: 2019-01-01 | End: 2019-01-01

## 2019-01-01 RX ORDER — METOPROLOL SUCCINATE 50 MG/1
50 TABLET, EXTENDED RELEASE ORAL EVERY MORNING
Status: DISCONTINUED | OUTPATIENT
Start: 2019-01-01 | End: 2019-01-01

## 2019-01-01 RX ORDER — POTASSIUM CHLORIDE 20 MEQ/1
20 TABLET, EXTENDED RELEASE ORAL
Status: DISCONTINUED | OUTPATIENT
Start: 2019-01-01 | End: 2019-01-01

## 2019-01-01 RX ORDER — SODIUM BICARBONATE 650 MG/1
1300 TABLET ORAL 3 TIMES DAILY
Qty: 180 TABLET | Refills: 2 | Status: SHIPPED | OUTPATIENT
Start: 2019-01-01

## 2019-01-01 RX ORDER — FUROSEMIDE 40 MG/1
40 TABLET ORAL DAILY
Qty: 60 TABLET | Refills: 3 | Status: SHIPPED | OUTPATIENT
Start: 2019-01-01

## 2019-01-01 RX ORDER — HYDRALAZINE HYDROCHLORIDE 50 MG/1
50 TABLET, FILM COATED ORAL 3 TIMES DAILY
Status: DISCONTINUED | OUTPATIENT
Start: 2019-01-01 | End: 2019-01-01

## 2019-01-01 RX ORDER — CLONIDINE HYDROCHLORIDE 0.3 MG/1
0.3 TABLET ORAL 3 TIMES DAILY
Qty: 90 TABLET | Refills: 2 | Status: SHIPPED | OUTPATIENT
Start: 2019-01-01 | End: 2019-01-01 | Stop reason: SDUPTHER

## 2019-01-01 RX ORDER — BUMETANIDE 1 MG/1
2 TABLET ORAL DAILY
Status: DISCONTINUED | OUTPATIENT
Start: 2019-01-01 | End: 2019-01-01

## 2019-01-01 RX ORDER — METOPROLOL SUCCINATE 100 MG/1
100 TABLET, EXTENDED RELEASE ORAL EVERY MORNING
Qty: 90 TABLET | Refills: 0 | Status: SHIPPED | OUTPATIENT
Start: 2019-01-01 | End: 2019-01-01 | Stop reason: DRUGHIGH

## 2019-01-01 RX ORDER — METOPROLOL SUCCINATE 100 MG/1
50 TABLET, EXTENDED RELEASE ORAL EVERY MORNING
Qty: 90 TABLET | Refills: 0 | Status: SHIPPED | OUTPATIENT
Start: 2019-01-01

## 2019-01-01 RX ORDER — CLONIDINE HYDROCHLORIDE 0.1 MG/1
0.1 TABLET ORAL 3 TIMES DAILY
Status: DISCONTINUED | OUTPATIENT
Start: 2019-01-01 | End: 2019-01-01

## 2019-01-01 RX ORDER — DEXTROSE MONOHYDRATE 25 G/50ML
12.5 INJECTION, SOLUTION INTRAVENOUS PRN
Status: DISCONTINUED | OUTPATIENT
Start: 2019-01-01 | End: 2019-01-01 | Stop reason: HOSPADM

## 2019-01-01 RX ORDER — BUMETANIDE 0.25 MG/ML
0.5 INJECTION, SOLUTION INTRAMUSCULAR; INTRAVENOUS ONCE
Status: DISCONTINUED | OUTPATIENT
Start: 2019-01-01 | End: 2019-01-01 | Stop reason: RX

## 2019-01-01 RX ORDER — METOPROLOL TARTRATE 5 MG/5ML
5 INJECTION INTRAVENOUS ONCE
Status: DISCONTINUED | OUTPATIENT
Start: 2019-01-01 | End: 2019-01-01 | Stop reason: ALTCHOICE

## 2019-01-01 RX ORDER — CLONIDINE HYDROCHLORIDE 0.3 MG/1
0.3 TABLET ORAL 3 TIMES DAILY
COMMUNITY
End: 2019-01-01 | Stop reason: SDUPTHER

## 2019-01-01 RX ORDER — ATORVASTATIN CALCIUM 10 MG/1
10 TABLET, FILM COATED ORAL DAILY
Qty: 90 TABLET | Refills: 1 | Status: ON HOLD | OUTPATIENT
Start: 2019-01-01 | End: 2019-01-01 | Stop reason: HOSPADM

## 2019-01-01 RX ORDER — BUMETANIDE 0.25 MG/ML
2 INJECTION, SOLUTION INTRAMUSCULAR; INTRAVENOUS ONCE
Status: COMPLETED | OUTPATIENT
Start: 2019-01-01 | End: 2019-01-01

## 2019-01-01 RX ORDER — DOXAZOSIN MESYLATE 4 MG/1
4 TABLET ORAL 2 TIMES DAILY
Qty: 60 TABLET | Refills: 3 | Status: SHIPPED | OUTPATIENT
Start: 2019-01-01 | End: 2019-01-01 | Stop reason: SDUPTHER

## 2019-01-01 RX ORDER — SENNA PLUS 8.6 MG/1
1 TABLET ORAL 2 TIMES DAILY
Status: DISCONTINUED | OUTPATIENT
Start: 2019-01-01 | End: 2019-01-01

## 2019-01-01 RX ORDER — SODIUM CHLORIDE, SODIUM LACTATE, POTASSIUM CHLORIDE, AND CALCIUM CHLORIDE .6; .31; .03; .02 G/100ML; G/100ML; G/100ML; G/100ML
200 INJECTION, SOLUTION INTRAVENOUS ONCE
Status: COMPLETED | OUTPATIENT
Start: 2019-01-01 | End: 2019-01-01

## 2019-01-01 RX ORDER — SODIUM CHLORIDE 0.9 % (FLUSH) 0.9 %
10 SYRINGE (ML) INJECTION PRN
Status: DISCONTINUED | OUTPATIENT
Start: 2019-01-01 | End: 2019-01-01

## 2019-01-01 RX ORDER — ISOSORBIDE MONONITRATE 30 MG/1
30 TABLET, EXTENDED RELEASE ORAL 2 TIMES DAILY
Status: DISCONTINUED | OUTPATIENT
Start: 2019-01-01 | End: 2019-01-01

## 2019-01-01 RX ORDER — ATORVASTATIN CALCIUM 10 MG/1
10 TABLET, FILM COATED ORAL DAILY
Status: ON HOLD | COMMUNITY
End: 2019-01-01 | Stop reason: HOSPADM

## 2019-01-01 RX ORDER — ATORVASTATIN CALCIUM 10 MG/1
TABLET, FILM COATED ORAL
Qty: 90 TABLET | Refills: 1 | Status: SHIPPED | OUTPATIENT
Start: 2019-01-01 | End: 2019-01-01 | Stop reason: SDUPTHER

## 2019-01-01 RX ORDER — MORPHINE SULFATE 4 MG/ML
4 INJECTION, SOLUTION INTRAMUSCULAR; INTRAVENOUS ONCE
Status: DISCONTINUED | OUTPATIENT
Start: 2019-01-01 | End: 2019-01-01 | Stop reason: HOSPADM

## 2019-01-01 RX ORDER — HYDRALAZINE HYDROCHLORIDE 50 MG/1
25 TABLET, FILM COATED ORAL EVERY 8 HOURS SCHEDULED
Qty: 90 TABLET | Refills: 3 | Status: SHIPPED | OUTPATIENT
Start: 2019-01-01 | End: 2019-01-01 | Stop reason: SDUPTHER

## 2019-01-01 RX ORDER — NITROGLYCERIN 0.4 MG/1
0.4 TABLET SUBLINGUAL EVERY 5 MIN PRN
Status: DISCONTINUED | OUTPATIENT
Start: 2019-01-01 | End: 2019-01-01 | Stop reason: HOSPADM

## 2019-01-01 RX ORDER — ATORVASTATIN CALCIUM 80 MG/1
80 TABLET, FILM COATED ORAL NIGHTLY
Status: DISCONTINUED | OUTPATIENT
Start: 2019-01-01 | End: 2019-01-01 | Stop reason: HOSPADM

## 2019-01-01 RX ORDER — DOXAZOSIN MESYLATE 4 MG/1
TABLET ORAL
Qty: 180 TABLET | Refills: 3 | Status: ON HOLD | OUTPATIENT
Start: 2019-01-01 | End: 2019-01-01 | Stop reason: HOSPADM

## 2019-01-01 RX ORDER — SODIUM BICARBONATE 650 MG/1
650 TABLET ORAL 2 TIMES DAILY
Qty: 60 TABLET | Refills: 5 | Status: ON HOLD | OUTPATIENT
Start: 2019-01-01 | End: 2019-01-01 | Stop reason: HOSPADM

## 2019-01-01 RX ORDER — KETAMINE HYDROCHLORIDE 50 MG/ML
100 INJECTION, SOLUTION, CONCENTRATE INTRAMUSCULAR; INTRAVENOUS ONCE
Status: DISCONTINUED | OUTPATIENT
Start: 2019-01-01 | End: 2019-01-01

## 2019-01-01 RX ORDER — VERAPAMIL HYDROCHLORIDE 120 MG/1
120 TABLET, FILM COATED ORAL DAILY
Status: ON HOLD | COMMUNITY
End: 2019-01-01 | Stop reason: HOSPADM

## 2019-01-01 RX ORDER — FERROUS SULFATE 325(65) MG
325 TABLET ORAL
Status: DISCONTINUED | OUTPATIENT
Start: 2019-01-01 | End: 2019-01-01

## 2019-01-01 RX ORDER — CLOPIDOGREL BISULFATE 75 MG/1
75 TABLET ORAL DAILY
Status: DISCONTINUED | OUTPATIENT
Start: 2019-01-01 | End: 2019-01-01 | Stop reason: HOSPADM

## 2019-01-01 RX ORDER — SODIUM CHLORIDE 0.9 % (FLUSH) 0.9 %
10 SYRINGE (ML) INJECTION EVERY 12 HOURS SCHEDULED
Status: DISCONTINUED | OUTPATIENT
Start: 2019-01-01 | End: 2019-01-01

## 2019-01-01 RX ORDER — VERAPAMIL HYDROCHLORIDE 120 MG/1
120 TABLET, FILM COATED ORAL DAILY
COMMUNITY
End: 2019-01-01 | Stop reason: SDUPTHER

## 2019-01-01 RX ORDER — ISOSORBIDE MONONITRATE 30 MG/1
30 TABLET, EXTENDED RELEASE ORAL DAILY
Qty: 30 TABLET | Refills: 3 | Status: SHIPPED | OUTPATIENT
Start: 2019-01-01 | End: 2019-01-01 | Stop reason: SDUPTHER

## 2019-01-01 RX ORDER — NITROGLYCERIN 0.4 MG/1
TABLET SUBLINGUAL
Status: COMPLETED
Start: 2019-01-01 | End: 2019-01-01

## 2019-01-01 RX ORDER — ATORVASTATIN CALCIUM 80 MG/1
80 TABLET, FILM COATED ORAL NIGHTLY
Qty: 30 TABLET | Refills: 3 | Status: SHIPPED | OUTPATIENT
Start: 2019-01-01 | End: 2019-01-01 | Stop reason: SDUPTHER

## 2019-01-01 RX ORDER — LEVOFLOXACIN 5 MG/ML
500 INJECTION, SOLUTION INTRAVENOUS
Status: DISCONTINUED | OUTPATIENT
Start: 2019-01-01 | End: 2019-01-01

## 2019-01-01 RX ORDER — HYDRALAZINE HYDROCHLORIDE 20 MG/ML
20 INJECTION INTRAMUSCULAR; INTRAVENOUS EVERY 4 HOURS PRN
Status: DISCONTINUED | OUTPATIENT
Start: 2019-01-01 | End: 2019-01-01 | Stop reason: HOSPADM

## 2019-01-01 RX ORDER — SODIUM CHLORIDE 0.9 % (FLUSH) 0.9 %
10 SYRINGE (ML) INJECTION PRN
Status: DISCONTINUED | OUTPATIENT
Start: 2019-01-01 | End: 2019-01-01 | Stop reason: SDUPTHER

## 2019-01-01 RX ORDER — HEPARIN SODIUM 1000 [USP'U]/ML
30 INJECTION, SOLUTION INTRAVENOUS; SUBCUTANEOUS PRN
Status: DISCONTINUED | OUTPATIENT
Start: 2019-01-01 | End: 2019-01-01

## 2019-01-01 RX ORDER — KETAMINE HCL IN NACL, ISO-OSM 100MG/10ML
SYRINGE (ML) INJECTION
Status: COMPLETED
Start: 2019-01-01 | End: 2019-01-01

## 2019-01-01 RX ORDER — LEVOFLOXACIN 250 MG/1
250 TABLET ORAL DAILY
Qty: 10 TABLET | Refills: 0 | Status: SHIPPED | OUTPATIENT
Start: 2019-01-01 | End: 2019-01-01 | Stop reason: ALTCHOICE

## 2019-01-01 RX ORDER — VITAMIN E 268 MG
400 CAPSULE ORAL DAILY
Qty: 90 CAPSULE | Refills: 3 | Status: SHIPPED | OUTPATIENT
Start: 2019-01-01

## 2019-01-01 RX ORDER — SODIUM BICARBONATE 650 MG/1
650 TABLET ORAL 2 TIMES DAILY
Status: ON HOLD | COMMUNITY
End: 2019-01-01 | Stop reason: HOSPADM

## 2019-01-01 RX ORDER — DILTIAZEM HYDROCHLORIDE 5 MG/ML
20 INJECTION INTRAVENOUS ONCE
Status: COMPLETED | OUTPATIENT
Start: 2019-01-01 | End: 2019-01-01

## 2019-01-01 RX ORDER — HEPARIN SODIUM 10000 [USP'U]/100ML
12 INJECTION, SOLUTION INTRAVENOUS CONTINUOUS
Status: DISCONTINUED | OUTPATIENT
Start: 2019-01-01 | End: 2019-01-01

## 2019-01-01 RX ORDER — ONDANSETRON 4 MG/1
4 TABLET, FILM COATED ORAL 3 TIMES DAILY PRN
Qty: 15 TABLET | Refills: 0 | Status: SHIPPED | OUTPATIENT
Start: 2019-01-01

## 2019-01-01 RX ORDER — ISOSORBIDE MONONITRATE 30 MG/1
30 TABLET, EXTENDED RELEASE ORAL DAILY
Status: DISCONTINUED | OUTPATIENT
Start: 2019-01-01 | End: 2019-01-01

## 2019-01-01 RX ORDER — HYDRALAZINE HYDROCHLORIDE 25 MG/1
25 TABLET, FILM COATED ORAL EVERY 8 HOURS SCHEDULED
Qty: 90 TABLET | Refills: 3 | Status: SHIPPED | OUTPATIENT
Start: 2019-01-01 | End: 2019-01-01 | Stop reason: SDUPTHER

## 2019-01-01 RX ORDER — NITROGLYCERIN 20 MG/100ML
INJECTION INTRAVENOUS
Status: COMPLETED
Start: 2019-01-01 | End: 2019-01-01

## 2019-01-01 RX ORDER — ISOSORBIDE MONONITRATE 30 MG/1
30 TABLET, EXTENDED RELEASE ORAL DAILY
Status: DISCONTINUED | OUTPATIENT
Start: 2019-01-01 | End: 2019-01-01 | Stop reason: HOSPADM

## 2019-01-01 RX ORDER — APIXABAN 2.5 MG/1
TABLET, FILM COATED ORAL
Qty: 60 TABLET | Refills: 0 | Status: SHIPPED | OUTPATIENT
Start: 2019-01-01 | End: 2019-01-01 | Stop reason: SDUPTHER

## 2019-01-01 RX ORDER — HYDRALAZINE HYDROCHLORIDE 50 MG/1
TABLET, FILM COATED ORAL
Qty: 135 TABLET | Refills: 3 | Status: SHIPPED | OUTPATIENT
Start: 2019-01-01 | End: 2019-01-01

## 2019-01-01 RX ORDER — HYDRALAZINE HYDROCHLORIDE 25 MG/1
25 TABLET, FILM COATED ORAL EVERY 8 HOURS SCHEDULED
Status: DISCONTINUED | OUTPATIENT
Start: 2019-01-01 | End: 2019-01-01 | Stop reason: HOSPADM

## 2019-01-01 RX ORDER — CLOPIDOGREL BISULFATE 75 MG/1
75 TABLET ORAL DAILY
Qty: 90 TABLET | Refills: 1 | Status: SHIPPED | OUTPATIENT
Start: 2019-01-01

## 2019-01-01 RX ORDER — HEPARIN SODIUM 1000 [USP'U]/ML
60 INJECTION, SOLUTION INTRAVENOUS; SUBCUTANEOUS PRN
Status: DISCONTINUED | OUTPATIENT
Start: 2019-01-01 | End: 2019-01-01

## 2019-01-01 RX ORDER — ONDANSETRON 2 MG/ML
4 INJECTION INTRAMUSCULAR; INTRAVENOUS ONCE
Status: COMPLETED | OUTPATIENT
Start: 2019-01-01 | End: 2019-01-01

## 2019-01-01 RX ORDER — IPRATROPIUM BROMIDE AND ALBUTEROL SULFATE 2.5; .5 MG/3ML; MG/3ML
1 SOLUTION RESPIRATORY (INHALATION) EVERY 4 HOURS
Qty: 30 VIAL | Refills: 1 | Status: SHIPPED | OUTPATIENT
Start: 2019-01-01

## 2019-01-01 RX ORDER — ASPIRIN 81 MG/1
81 TABLET ORAL DAILY
Status: DISCONTINUED | OUTPATIENT
Start: 2019-01-01 | End: 2019-01-01 | Stop reason: HOSPADM

## 2019-01-01 RX ORDER — FERROUS SULFATE 325(65) MG
325 TABLET ORAL
Status: DISCONTINUED | OUTPATIENT
Start: 2019-01-01 | End: 2019-01-01 | Stop reason: HOSPADM

## 2019-01-01 RX ORDER — SODIUM CHLORIDE 9 MG/ML
INJECTION, SOLUTION INTRAVENOUS CONTINUOUS
Status: DISCONTINUED | OUTPATIENT
Start: 2019-01-01 | End: 2019-01-01

## 2019-01-01 RX ORDER — ACETAMINOPHEN 325 MG/1
650 TABLET ORAL EVERY 4 HOURS PRN
Status: DISCONTINUED | OUTPATIENT
Start: 2019-01-01 | End: 2019-01-01 | Stop reason: HOSPADM

## 2019-01-01 RX ORDER — DEXTROSE MONOHYDRATE 50 MG/ML
100 INJECTION, SOLUTION INTRAVENOUS PRN
Status: DISCONTINUED | OUTPATIENT
Start: 2019-01-01 | End: 2019-01-01

## 2019-01-01 RX ORDER — SODIUM CHLORIDE 0.9 % (FLUSH) 0.9 %
10 SYRINGE (ML) INJECTION PRN
Status: DISCONTINUED | OUTPATIENT
Start: 2019-01-01 | End: 2019-01-01 | Stop reason: HOSPADM

## 2019-01-01 RX ORDER — ASPIRIN 81 MG/1
81 TABLET ORAL DAILY
Qty: 30 TABLET | Refills: 3 | Status: SHIPPED | OUTPATIENT
Start: 2019-01-01 | End: 2019-01-01 | Stop reason: ALTCHOICE

## 2019-01-01 RX ORDER — ATORVASTATIN CALCIUM 80 MG/1
80 TABLET, FILM COATED ORAL NIGHTLY
Qty: 30 TABLET | Refills: 3 | Status: SHIPPED | OUTPATIENT
Start: 2019-01-01

## 2019-01-01 RX ORDER — MORPHINE SULFATE 2 MG/ML
INJECTION, SOLUTION INTRAMUSCULAR; INTRAVENOUS
Status: COMPLETED
Start: 2019-01-01 | End: 2019-01-01

## 2019-01-01 RX ORDER — GLIMEPIRIDE 1 MG/1
TABLET ORAL
Qty: 90 TABLET | Refills: 0 | Status: SHIPPED | OUTPATIENT
Start: 2019-01-01 | End: 2019-01-01 | Stop reason: CLARIF

## 2019-01-01 RX ORDER — PANTOPRAZOLE SODIUM 40 MG/1
40 TABLET, DELAYED RELEASE ORAL
Status: DISCONTINUED | OUTPATIENT
Start: 2019-01-01 | End: 2019-01-01

## 2019-01-01 RX ORDER — HYDRALAZINE HYDROCHLORIDE 10 MG/1
10 TABLET, FILM COATED ORAL EVERY 8 HOURS SCHEDULED
Status: DISCONTINUED | OUTPATIENT
Start: 2019-01-01 | End: 2019-01-01

## 2019-01-01 RX ORDER — LEVOFLOXACIN 5 MG/ML
500 INJECTION, SOLUTION INTRAVENOUS EVERY 24 HOURS
Status: DISCONTINUED | OUTPATIENT
Start: 2019-01-01 | End: 2019-01-01

## 2019-01-01 RX ORDER — FUROSEMIDE 10 MG/ML
40 INJECTION INTRAMUSCULAR; INTRAVENOUS 2 TIMES DAILY
Status: DISCONTINUED | OUTPATIENT
Start: 2019-01-01 | End: 2019-01-01

## 2019-01-01 RX ORDER — SODIUM BICARBONATE 650 MG/1
1300 TABLET ORAL 3 TIMES DAILY
Status: DISCONTINUED | OUTPATIENT
Start: 2019-01-01 | End: 2019-01-01 | Stop reason: HOSPADM

## 2019-01-01 RX ORDER — ISOSORBIDE MONONITRATE 30 MG/1
60 TABLET, EXTENDED RELEASE ORAL DAILY
Qty: 30 TABLET | Refills: 3 | Status: SHIPPED | OUTPATIENT
Start: 2019-01-01

## 2019-01-01 RX ORDER — SODIUM CHLORIDE 0.9 % (FLUSH) 0.9 %
10 SYRINGE (ML) INJECTION EVERY 12 HOURS SCHEDULED
Status: DISCONTINUED | OUTPATIENT
Start: 2019-01-01 | End: 2019-01-01 | Stop reason: HOSPADM

## 2019-01-01 RX ORDER — CLONIDINE HYDROCHLORIDE 0.3 MG/1
TABLET ORAL
Qty: 270 TABLET | Refills: 2 | Status: SHIPPED | OUTPATIENT
Start: 2019-01-01

## 2019-01-01 RX ORDER — SENNA PLUS 8.6 MG/1
2 TABLET ORAL EVERY 4 HOURS
Status: DISCONTINUED | OUTPATIENT
Start: 2019-01-01 | End: 2019-01-01

## 2019-01-01 RX ORDER — NITROGLYCERIN 20 MG/100ML
INJECTION INTRAVENOUS
Status: DISPENSED
Start: 2019-01-01 | End: 2019-01-01

## 2019-01-01 RX ORDER — IPRATROPIUM BROMIDE AND ALBUTEROL SULFATE 2.5; .5 MG/3ML; MG/3ML
1 SOLUTION RESPIRATORY (INHALATION) ONCE
Status: COMPLETED | OUTPATIENT
Start: 2019-01-01 | End: 2019-01-01

## 2019-01-01 RX ORDER — SODIUM BICARBONATE 650 MG/1
650 TABLET ORAL 2 TIMES DAILY
Status: DISCONTINUED | OUTPATIENT
Start: 2019-01-01 | End: 2019-01-01

## 2019-01-01 RX ORDER — NITROGLYCERIN 0.4 MG/1
TABLET SUBLINGUAL
Qty: 25 TABLET | Refills: 3 | Status: SHIPPED | OUTPATIENT
Start: 2019-01-01

## 2019-01-01 RX ORDER — DOPAMINE HYDROCHLORIDE 40 MG/ML
INJECTION INTRAVENOUS
Status: COMPLETED | OUTPATIENT
Start: 2019-01-01 | End: 2019-01-01

## 2019-01-01 RX ORDER — ALBUTEROL SULFATE 2.5 MG/3ML
2.5 SOLUTION RESPIRATORY (INHALATION) ONCE
Status: COMPLETED | OUTPATIENT
Start: 2019-01-01 | End: 2019-01-01

## 2019-01-01 RX ORDER — GLIMEPIRIDE 1 MG/1
1 TABLET ORAL
Qty: 90 TABLET | Refills: 1 | Status: SHIPPED | OUTPATIENT
Start: 2019-01-01 | End: 2019-01-01 | Stop reason: SDUPTHER

## 2019-01-01 RX ADMIN — HYDRALAZINE HYDROCHLORIDE 25 MG: 25 TABLET, FILM COATED ORAL at 06:22

## 2019-01-01 RX ADMIN — CLOPIDOGREL 75 MG: 75 TABLET, FILM COATED ORAL at 08:27

## 2019-01-01 RX ADMIN — IRON SUCROSE 300 MG: 20 INJECTION, SOLUTION INTRAVENOUS at 11:25

## 2019-01-01 RX ADMIN — APIXABAN 2.5 MG: 2.5 TABLET, FILM COATED ORAL at 08:43

## 2019-01-01 RX ADMIN — FLUCONAZOLE 200 MG: 200 TABLET ORAL at 10:40

## 2019-01-01 RX ADMIN — Medication 10 ML: at 08:00

## 2019-01-01 RX ADMIN — SODIUM BICARBONATE 150 MEQ: 84 INJECTION, SOLUTION INTRAVENOUS at 12:51

## 2019-01-01 RX ADMIN — ANTACID TABLETS 500 MG: 500 TABLET, CHEWABLE ORAL at 11:45

## 2019-01-01 RX ADMIN — Medication 10 ML: at 09:37

## 2019-01-01 RX ADMIN — INSULIN LISPRO 1 UNITS: 100 INJECTION, SOLUTION INTRAVENOUS; SUBCUTANEOUS at 12:08

## 2019-01-01 RX ADMIN — DILTIAZEM HYDROCHLORIDE 15 MG/HR: 5 INJECTION INTRAVENOUS at 21:33

## 2019-01-01 RX ADMIN — CLONIDINE HYDROCHLORIDE 0.3 MG: 0.2 TABLET ORAL at 14:41

## 2019-01-01 RX ADMIN — SENNOSIDES 17.2 MG: 8.6 TABLET, FILM COATED ORAL at 14:07

## 2019-01-01 RX ADMIN — CLONIDINE HYDROCHLORIDE 0.3 MG: 0.2 TABLET ORAL at 19:41

## 2019-01-01 RX ADMIN — PANTOPRAZOLE SODIUM 40 MG: 40 INJECTION, POWDER, FOR SOLUTION INTRAVENOUS at 10:40

## 2019-01-01 RX ADMIN — ATORVASTATIN CALCIUM 80 MG: 80 TABLET, FILM COATED ORAL at 21:14

## 2019-01-01 RX ADMIN — APIXABAN 2.5 MG: 2.5 TABLET, FILM COATED ORAL at 19:50

## 2019-01-01 RX ADMIN — CEFTRIAXONE SODIUM 1 G: 1 INJECTION, POWDER, FOR SOLUTION INTRAMUSCULAR; INTRAVENOUS at 09:39

## 2019-01-01 RX ADMIN — CLONIDINE HYDROCHLORIDE 0.3 MG: 0.2 TABLET ORAL at 08:34

## 2019-01-01 RX ADMIN — AMIODARONE HYDROCHLORIDE 0.5 MG/MIN: 1.8 INJECTION, SOLUTION INTRAVENOUS at 17:12

## 2019-01-01 RX ADMIN — SODIUM BICARBONATE 650 MG: 650 TABLET ORAL at 21:16

## 2019-01-01 RX ADMIN — ISOSORBIDE MONONITRATE 30 MG: 30 TABLET ORAL at 09:33

## 2019-01-01 RX ADMIN — APIXABAN 2.5 MG: 2.5 TABLET, FILM COATED ORAL at 21:00

## 2019-01-01 RX ADMIN — SODIUM CHLORIDE, POTASSIUM CHLORIDE, SODIUM LACTATE AND CALCIUM CHLORIDE 200 ML: 600; 310; 30; 20 INJECTION, SOLUTION INTRAVENOUS at 16:45

## 2019-01-01 RX ADMIN — APIXABAN 2.5 MG: 2.5 TABLET, FILM COATED ORAL at 07:44

## 2019-01-01 RX ADMIN — DARBEPOETIN ALFA 60 MCG: 60 INJECTION, SOLUTION INTRAVENOUS; SUBCUTANEOUS at 02:22

## 2019-01-01 RX ADMIN — CLONIDINE HYDROCHLORIDE 0.3 MG: 0.2 TABLET ORAL at 21:22

## 2019-01-01 RX ADMIN — Medication 10 ML: at 11:57

## 2019-01-01 RX ADMIN — CLOPIDOGREL 75 MG: 75 TABLET, FILM COATED ORAL at 09:36

## 2019-01-01 RX ADMIN — APIXABAN 2.5 MG: 2.5 TABLET, FILM COATED ORAL at 08:38

## 2019-01-01 RX ADMIN — SODIUM BICARBONATE 1300 MG: 650 TABLET ORAL at 14:37

## 2019-01-01 RX ADMIN — SENNOSIDES 17.2 MG: 8.6 TABLET, FILM COATED ORAL at 10:19

## 2019-01-01 RX ADMIN — FERROUS SULFATE TAB 325 MG (65 MG ELEMENTAL FE) 325 MG: 325 (65 FE) TAB at 12:06

## 2019-01-01 RX ADMIN — SODIUM BICARBONATE 1300 MG: 650 TABLET ORAL at 20:10

## 2019-01-01 RX ADMIN — SODIUM CHLORIDE 8 MG/HR: 9 INJECTION, SOLUTION INTRAVENOUS at 19:56

## 2019-01-01 RX ADMIN — Medication 10 ML: at 17:47

## 2019-01-01 RX ADMIN — CLINDAMYCIN PHOSPHATE 600 MG: 600 INJECTION, SOLUTION INTRAVENOUS at 10:40

## 2019-01-01 RX ADMIN — SENNOSIDES 17.2 MG: 8.6 TABLET, FILM COATED ORAL at 06:35

## 2019-01-01 RX ADMIN — CLOPIDOGREL 75 MG: 75 TABLET, FILM COATED ORAL at 07:47

## 2019-01-01 RX ADMIN — CLOPIDOGREL 75 MG: 75 TABLET, FILM COATED ORAL at 08:43

## 2019-01-01 RX ADMIN — SODIUM CHLORIDE: 9 INJECTION, SOLUTION INTRAVENOUS at 01:48

## 2019-01-01 RX ADMIN — CLONIDINE HYDROCHLORIDE 0.3 MG: 0.2 TABLET ORAL at 21:00

## 2019-01-01 RX ADMIN — ATORVASTATIN CALCIUM 80 MG: 80 TABLET, FILM COATED ORAL at 20:10

## 2019-01-01 RX ADMIN — Medication 10 ML: at 07:45

## 2019-01-01 RX ADMIN — CEFTRIAXONE SODIUM 1 G: 1 INJECTION, POWDER, FOR SOLUTION INTRAMUSCULAR; INTRAVENOUS at 10:39

## 2019-01-01 RX ADMIN — Medication 10 ML: at 21:17

## 2019-01-01 RX ADMIN — EPINEPHRINE 1 MG: 0.1 INJECTION, SOLUTION ENDOTRACHEAL; INTRACARDIAC; INTRAVENOUS at 12:28

## 2019-01-01 RX ADMIN — NITROGLYCERIN 0.4 MG: 0.4 TABLET, ORALLY DISINTEGRATING SUBLINGUAL at 05:55

## 2019-01-01 RX ADMIN — HEPARIN SODIUM 12 UNITS/KG/HR: 10000 INJECTION, SOLUTION INTRAVENOUS at 18:30

## 2019-01-01 RX ADMIN — Medication 2 PUFF: at 22:44

## 2019-01-01 RX ADMIN — IPRATROPIUM BROMIDE AND ALBUTEROL SULFATE 3 ML: .5; 3 SOLUTION RESPIRATORY (INHALATION) at 22:39

## 2019-01-01 RX ADMIN — Medication 10 ML: at 20:24

## 2019-01-01 RX ADMIN — APIXABAN 2.5 MG: 2.5 TABLET, FILM COATED ORAL at 09:36

## 2019-01-01 RX ADMIN — CLONIDINE HYDROCHLORIDE 0.3 MG: 0.2 TABLET ORAL at 15:41

## 2019-01-01 RX ADMIN — VERAPAMIL HYDROCHLORIDE 120 MG: 120 TABLET, FILM COATED ORAL at 08:58

## 2019-01-01 RX ADMIN — HYDRALAZINE HYDROCHLORIDE 25 MG: 25 TABLET, FILM COATED ORAL at 21:14

## 2019-01-01 RX ADMIN — ONDANSETRON 4 MG: 2 INJECTION INTRAMUSCULAR; INTRAVENOUS at 15:46

## 2019-01-01 RX ADMIN — CLOPIDOGREL 75 MG: 75 TABLET, FILM COATED ORAL at 07:55

## 2019-01-01 RX ADMIN — HYDRALAZINE HYDROCHLORIDE 20 MG: 20 INJECTION INTRAMUSCULAR; INTRAVENOUS at 23:49

## 2019-01-01 RX ADMIN — METOPROLOL SUCCINATE 100 MG: 100 TABLET, FILM COATED, EXTENDED RELEASE ORAL at 09:08

## 2019-01-01 RX ADMIN — SODIUM BICARBONATE 650 MG: 650 TABLET ORAL at 20:23

## 2019-01-01 RX ADMIN — ISOSORBIDE MONONITRATE 30 MG: 30 TABLET ORAL at 21:21

## 2019-01-01 RX ADMIN — METOPROLOL SUCCINATE 50 MG: 50 TABLET, FILM COATED, EXTENDED RELEASE ORAL at 10:18

## 2019-01-01 RX ADMIN — CLONIDINE HYDROCHLORIDE 0.3 MG: 0.2 TABLET ORAL at 15:19

## 2019-01-01 RX ADMIN — AMIODARONE HYDROCHLORIDE 1 MG/MIN: 1.8 INJECTION, SOLUTION INTRAVENOUS at 10:03

## 2019-01-01 RX ADMIN — ANTACID TABLETS 500 MG: 500 TABLET, CHEWABLE ORAL at 04:55

## 2019-01-01 RX ADMIN — CLONIDINE HYDROCHLORIDE 0.1 MG: 0.1 TABLET ORAL at 09:36

## 2019-01-01 RX ADMIN — HYDRALAZINE HYDROCHLORIDE 10 MG: 20 INJECTION INTRAMUSCULAR; INTRAVENOUS at 03:37

## 2019-01-01 RX ADMIN — Medication 2 PUFF: at 20:01

## 2019-01-01 RX ADMIN — Medication 10 ML: at 19:41

## 2019-01-01 RX ADMIN — HYDRALAZINE HYDROCHLORIDE 20 MG: 20 INJECTION INTRAMUSCULAR; INTRAVENOUS at 06:43

## 2019-01-01 RX ADMIN — Medication 150 MEQ: at 12:51

## 2019-01-01 RX ADMIN — ONDANSETRON 4 MG: 2 INJECTION INTRAMUSCULAR; INTRAVENOUS at 07:36

## 2019-01-01 RX ADMIN — FUROSEMIDE 40 MG: 10 INJECTION, SOLUTION INTRAMUSCULAR; INTRAVENOUS at 17:47

## 2019-01-01 RX ADMIN — CLONIDINE HYDROCHLORIDE 0.3 MG: 0.2 TABLET ORAL at 14:29

## 2019-01-01 RX ADMIN — METOPROLOL SUCCINATE 50 MG: 50 TABLET, FILM COATED, EXTENDED RELEASE ORAL at 08:38

## 2019-01-01 RX ADMIN — HYDRALAZINE HYDROCHLORIDE 25 MG: 25 TABLET, FILM COATED ORAL at 15:42

## 2019-01-01 RX ADMIN — HEPARIN SODIUM 1820 UNITS: 1000 INJECTION INTRAVENOUS; SUBCUTANEOUS at 01:32

## 2019-01-01 RX ADMIN — FERROUS SULFATE TAB 325 MG (65 MG ELEMENTAL FE) 325 MG: 325 (65 FE) TAB at 09:37

## 2019-01-01 RX ADMIN — CLONIDINE HYDROCHLORIDE 0.3 MG: 0.2 TABLET ORAL at 09:07

## 2019-01-01 RX ADMIN — CLONIDINE HYDROCHLORIDE 0.1 MG: 0.1 TABLET ORAL at 21:21

## 2019-01-01 RX ADMIN — NITROGLYCERIN 10 MCG/MIN: 20 INJECTION INTRAVENOUS at 21:50

## 2019-01-01 RX ADMIN — CLOPIDOGREL 75 MG: 75 TABLET, FILM COATED ORAL at 09:09

## 2019-01-01 RX ADMIN — FERROUS SULFATE TAB 325 MG (65 MG ELEMENTAL FE) 325 MG: 325 (65 FE) TAB at 09:08

## 2019-01-01 RX ADMIN — IPRATROPIUM BROMIDE AND ALBUTEROL SULFATE 3 ML: .5; 3 SOLUTION RESPIRATORY (INHALATION) at 16:47

## 2019-01-01 RX ADMIN — HYDRALAZINE HYDROCHLORIDE 10 MG: 10 TABLET, FILM COATED ORAL at 06:12

## 2019-01-01 RX ADMIN — CLOPIDOGREL 75 MG: 75 TABLET, FILM COATED ORAL at 09:08

## 2019-01-01 RX ADMIN — FERROUS SULFATE TAB 325 MG (65 MG ELEMENTAL FE) 325 MG: 325 (65 FE) TAB at 08:27

## 2019-01-01 RX ADMIN — ALBUTEROL SULFATE 2.5 MG: 2.5 SOLUTION RESPIRATORY (INHALATION) at 12:15

## 2019-01-01 RX ADMIN — POTASSIUM CHLORIDE 20 MEQ: 1500 TABLET, EXTENDED RELEASE ORAL at 09:37

## 2019-01-01 RX ADMIN — Medication 10 ML: at 10:39

## 2019-01-01 RX ADMIN — HEPATITIS B VACCINE (RECOMBINANT) 2 ML: 20 INJECTION, SUSPENSION INTRAMUSCULAR at 11:23

## 2019-01-01 RX ADMIN — GLIMEPIRIDE 1 MG: 1 TABLET ORAL at 08:38

## 2019-01-01 RX ADMIN — ISOSORBIDE MONONITRATE 30 MG: 30 TABLET ORAL at 17:47

## 2019-01-01 RX ADMIN — ATORVASTATIN CALCIUM 80 MG: 80 TABLET, FILM COATED ORAL at 21:22

## 2019-01-01 RX ADMIN — HYDRALAZINE HYDROCHLORIDE 25 MG: 25 TABLET, FILM COATED ORAL at 14:37

## 2019-01-01 RX ADMIN — CLONIDINE HYDROCHLORIDE 0.3 MG: 0.2 TABLET ORAL at 21:16

## 2019-01-01 RX ADMIN — FUROSEMIDE 40 MG: 10 INJECTION, SOLUTION INTRAMUSCULAR; INTRAVENOUS at 21:36

## 2019-01-01 RX ADMIN — APIXABAN 2.5 MG: 2.5 TABLET, FILM COATED ORAL at 20:57

## 2019-01-01 RX ADMIN — METOPROLOL SUCCINATE 100 MG: 100 TABLET, FILM COATED, EXTENDED RELEASE ORAL at 11:12

## 2019-01-01 RX ADMIN — LEVOFLOXACIN 500 MG: 5 INJECTION, SOLUTION INTRAVENOUS at 16:10

## 2019-01-01 RX ADMIN — Medication 10 ML: at 21:15

## 2019-01-01 RX ADMIN — APIXABAN 2.5 MG: 2.5 TABLET, FILM COATED ORAL at 09:09

## 2019-01-01 RX ADMIN — Medication 10 ML: at 09:08

## 2019-01-01 RX ADMIN — Medication 10 ML: at 09:48

## 2019-01-01 RX ADMIN — BUMETANIDE 0.5 MG/HR: 0.25 INJECTION INTRAMUSCULAR; INTRAVENOUS at 09:56

## 2019-01-01 RX ADMIN — SODIUM BICARBONATE 1300 MG: 650 TABLET ORAL at 09:35

## 2019-01-01 RX ADMIN — APIXABAN 2.5 MG: 2.5 TABLET, FILM COATED ORAL at 20:23

## 2019-01-01 RX ADMIN — EPINEPHRINE 3 MCG/MIN: 1 INJECTION, SOLUTION INTRAMUSCULAR; INTRAVENOUS; SUBCUTANEOUS at 11:50

## 2019-01-01 RX ADMIN — SODIUM BICARBONATE 650 MG: 650 TABLET ORAL at 19:41

## 2019-01-01 RX ADMIN — CLONIDINE HYDROCHLORIDE 0.3 MG: 0.2 TABLET ORAL at 14:37

## 2019-01-01 RX ADMIN — IPRATROPIUM BROMIDE AND ALBUTEROL SULFATE 3 ML: .5; 3 SOLUTION RESPIRATORY (INHALATION) at 02:16

## 2019-01-01 RX ADMIN — APIXABAN 2.5 MG: 2.5 TABLET, FILM COATED ORAL at 09:37

## 2019-01-01 RX ADMIN — METOPROLOL SUCCINATE 50 MG: 50 TABLET, FILM COATED, EXTENDED RELEASE ORAL at 08:33

## 2019-01-01 RX ADMIN — SODIUM BICARBONATE 650 MG: 650 TABLET ORAL at 09:37

## 2019-01-01 RX ADMIN — FUROSEMIDE 40 MG: 10 INJECTION, SOLUTION INTRAMUSCULAR; INTRAVENOUS at 08:09

## 2019-01-01 RX ADMIN — DARBEPOETIN ALFA 100 MCG: 100 INJECTION, SOLUTION INTRAVENOUS; SUBCUTANEOUS at 14:29

## 2019-01-01 RX ADMIN — FUROSEMIDE 40 MG: 10 INJECTION, SOLUTION INTRAMUSCULAR; INTRAVENOUS at 08:42

## 2019-01-01 RX ADMIN — CLONIDINE HYDROCHLORIDE 0.3 MG: 0.2 TABLET ORAL at 20:23

## 2019-01-01 RX ADMIN — IPRATROPIUM BROMIDE AND ALBUTEROL SULFATE 3 ML: .5; 3 SOLUTION RESPIRATORY (INHALATION) at 06:28

## 2019-01-01 RX ADMIN — CLONIDINE HYDROCHLORIDE 0.3 MG: 0.2 TABLET ORAL at 15:16

## 2019-01-01 RX ADMIN — DARBEPOETIN ALFA 60 MCG: 60 INJECTION, SOLUTION INTRAVENOUS; SUBCUTANEOUS at 09:32

## 2019-01-01 RX ADMIN — CLONIDINE HYDROCHLORIDE 0.3 MG: 0.2 TABLET ORAL at 08:27

## 2019-01-01 RX ADMIN — CLONIDINE HYDROCHLORIDE 0.3 MG: 0.2 TABLET ORAL at 08:43

## 2019-01-01 RX ADMIN — FERROUS SULFATE TAB 325 MG (65 MG ELEMENTAL FE) 325 MG: 325 (65 FE) TAB at 08:10

## 2019-01-01 RX ADMIN — Medication 10 ML: at 08:43

## 2019-01-01 RX ADMIN — SENNOSIDES 8.6 MG: 8.6 TABLET, FILM COATED ORAL at 12:09

## 2019-01-01 RX ADMIN — METOPROLOL SUCCINATE 100 MG: 100 TABLET, FILM COATED, EXTENDED RELEASE ORAL at 09:37

## 2019-01-01 RX ADMIN — BUMETANIDE 2 MG: 1 TABLET ORAL at 09:37

## 2019-01-01 RX ADMIN — GLIMEPIRIDE 1 MG: 1 TABLET ORAL at 07:57

## 2019-01-01 RX ADMIN — CLONIDINE HYDROCHLORIDE 0.3 MG: 0.2 TABLET ORAL at 21:14

## 2019-01-01 RX ADMIN — IPRATROPIUM BROMIDE AND ALBUTEROL SULFATE 3 ML: .5; 3 SOLUTION RESPIRATORY (INHALATION) at 16:59

## 2019-01-01 RX ADMIN — IPRATROPIUM BROMIDE AND ALBUTEROL SULFATE 3 ML: .5; 3 SOLUTION RESPIRATORY (INHALATION) at 09:52

## 2019-01-01 RX ADMIN — HYDRALAZINE HYDROCHLORIDE 20 MG: 20 INJECTION INTRAMUSCULAR; INTRAVENOUS at 23:43

## 2019-01-01 RX ADMIN — FERROUS SULFATE TAB 325 MG (65 MG ELEMENTAL FE) 325 MG: 325 (65 FE) TAB at 09:09

## 2019-01-01 RX ADMIN — INSULIN LISPRO 1 UNITS: 100 INJECTION, SOLUTION INTRAVENOUS; SUBCUTANEOUS at 11:40

## 2019-01-01 RX ADMIN — APIXABAN 2.5 MG: 2.5 TABLET, FILM COATED ORAL at 09:07

## 2019-01-01 RX ADMIN — HEPARIN SODIUM 1820 UNITS: 1000 INJECTION INTRAVENOUS; SUBCUTANEOUS at 11:26

## 2019-01-01 RX ADMIN — CLOPIDOGREL BISULFATE 75 MG: 75 TABLET, FILM COATED ORAL at 10:40

## 2019-01-01 RX ADMIN — SODIUM BICARBONATE 1300 MG: 650 TABLET ORAL at 08:10

## 2019-01-01 RX ADMIN — GLIMEPIRIDE 1 MG: 1 TABLET ORAL at 06:44

## 2019-01-01 RX ADMIN — ISOSORBIDE MONONITRATE 30 MG: 30 TABLET ORAL at 00:37

## 2019-01-01 RX ADMIN — ASPIRIN 81 MG: 81 TABLET ORAL at 09:07

## 2019-01-01 RX ADMIN — IPRATROPIUM BROMIDE AND ALBUTEROL SULFATE 3 ML: .5; 3 SOLUTION RESPIRATORY (INHALATION) at 20:30

## 2019-01-01 RX ADMIN — CLONIDINE HYDROCHLORIDE 0.1 MG: 0.1 TABLET ORAL at 00:37

## 2019-01-01 RX ADMIN — IOPAMIDOL 105 ML: 755 INJECTION, SOLUTION INTRAVENOUS at 03:08

## 2019-01-01 RX ADMIN — CLONIDINE HYDROCHLORIDE 0.3 MG: 0.2 TABLET ORAL at 09:09

## 2019-01-01 RX ADMIN — ONDANSETRON 4 MG: 2 INJECTION INTRAMUSCULAR; INTRAVENOUS at 10:40

## 2019-01-01 RX ADMIN — IRON SUCROSE 300 MG: 20 INJECTION, SOLUTION INTRAVENOUS at 11:56

## 2019-01-01 RX ADMIN — Medication 100 MG: at 11:05

## 2019-01-01 RX ADMIN — Medication 10 ML: at 08:10

## 2019-01-01 RX ADMIN — SODIUM BICARBONATE 1300 MG: 650 TABLET ORAL at 09:07

## 2019-01-01 RX ADMIN — HYDRALAZINE HYDROCHLORIDE 50 MG: 50 TABLET, FILM COATED ORAL at 20:10

## 2019-01-01 RX ADMIN — Medication 10 ML: at 20:16

## 2019-01-01 RX ADMIN — MORPHINE SULFATE 2 MG: 2 INJECTION, SOLUTION INTRAMUSCULAR; INTRAVENOUS at 01:31

## 2019-01-01 RX ADMIN — DARBEPOETIN ALFA 40 MCG: 40 INJECTION, SOLUTION INTRAVENOUS; SUBCUTANEOUS at 10:15

## 2019-01-01 RX ADMIN — NITROGLYCERIN 10 MCG/MIN: 20 INJECTION INTRAVENOUS at 03:30

## 2019-01-01 RX ADMIN — HYDRALAZINE HYDROCHLORIDE 50 MG: 50 TABLET, FILM COATED ORAL at 00:37

## 2019-01-01 RX ADMIN — CLONIDINE HYDROCHLORIDE 0.3 MG: 0.2 TABLET ORAL at 15:04

## 2019-01-01 RX ADMIN — VERAPAMIL HYDROCHLORIDE 120 MG: 120 TABLET, FILM COATED ORAL at 07:55

## 2019-01-01 RX ADMIN — IPRATROPIUM BROMIDE AND ALBUTEROL SULFATE 3 ML: .5; 3 SOLUTION RESPIRATORY (INHALATION) at 20:01

## 2019-01-01 RX ADMIN — FUROSEMIDE 20 MG: 10 INJECTION, SOLUTION INTRAMUSCULAR; INTRAVENOUS at 15:43

## 2019-01-01 RX ADMIN — SODIUM BICARBONATE 650 MG: 650 TABLET ORAL at 08:26

## 2019-01-01 RX ADMIN — Medication 10 ML: at 08:47

## 2019-01-01 RX ADMIN — HEPARIN SODIUM 1780 UNITS: 1000 INJECTION INTRAVENOUS; SUBCUTANEOUS at 01:30

## 2019-01-01 RX ADMIN — SODIUM BICARBONATE 1300 MG: 650 TABLET ORAL at 15:16

## 2019-01-01 RX ADMIN — EPINEPHRINE 1 MG: 0.1 INJECTION, SOLUTION ENDOTRACHEAL; INTRACARDIAC; INTRAVENOUS at 12:25

## 2019-01-01 RX ADMIN — FERROUS SULFATE TAB 325 MG (65 MG ELEMENTAL FE) 325 MG: 325 (65 FE) TAB at 08:34

## 2019-01-01 RX ADMIN — ISOSORBIDE MONONITRATE 30 MG: 30 TABLET ORAL at 20:10

## 2019-01-01 RX ADMIN — INSULIN LISPRO 1 UNITS: 100 INJECTION, SOLUTION INTRAVENOUS; SUBCUTANEOUS at 12:12

## 2019-01-01 RX ADMIN — METOPROLOL SUCCINATE 100 MG: 100 TABLET, FILM COATED, EXTENDED RELEASE ORAL at 09:09

## 2019-01-01 RX ADMIN — LORAZEPAM 1 MG: 2 INJECTION INTRAMUSCULAR; INTRAVENOUS at 17:39

## 2019-01-01 RX ADMIN — POTASSIUM BICARBONATE 40 MEQ: 391 TABLET, EFFERVESCENT ORAL at 11:23

## 2019-01-01 RX ADMIN — SODIUM BICARBONATE 1300 MG: 650 TABLET ORAL at 08:32

## 2019-01-01 RX ADMIN — IPRATROPIUM BROMIDE AND ALBUTEROL SULFATE 3 ML: .5; 3 SOLUTION RESPIRATORY (INHALATION) at 13:40

## 2019-01-01 RX ADMIN — GLIMEPIRIDE 1 MG: 1 TABLET ORAL at 08:58

## 2019-01-01 RX ADMIN — CHLOROTHIAZIDE SODIUM 500 MG: 500 INJECTION, POWDER, LYOPHILIZED, FOR SOLUTION INTRAVENOUS at 11:21

## 2019-01-01 RX ADMIN — CLONIDINE HYDROCHLORIDE 0.3 MG: 0.2 TABLET ORAL at 20:57

## 2019-01-01 RX ADMIN — AMIODARONE HYDROCHLORIDE 150 MG: 1.5 INJECTION, SOLUTION INTRAVENOUS at 09:53

## 2019-01-01 RX ADMIN — ISOSORBIDE MONONITRATE 30 MG: 30 TABLET ORAL at 09:37

## 2019-01-01 RX ADMIN — DILTIAZEM HYDROCHLORIDE 20 MG: 5 INJECTION INTRAVENOUS at 14:03

## 2019-01-01 RX ADMIN — FERROUS SULFATE TAB 325 MG (65 MG ELEMENTAL FE) 325 MG: 325 (65 FE) TAB at 08:43

## 2019-01-01 RX ADMIN — IPRATROPIUM BROMIDE AND ALBUTEROL SULFATE 3 ML: .5; 3 SOLUTION RESPIRATORY (INHALATION) at 05:45

## 2019-01-01 RX ADMIN — HYDRALAZINE HYDROCHLORIDE 20 MG: 20 INJECTION INTRAMUSCULAR; INTRAVENOUS at 05:49

## 2019-01-01 RX ADMIN — BUMETANIDE 0.5 MG/HR: 0.25 INJECTION INTRAMUSCULAR; INTRAVENOUS at 04:34

## 2019-01-01 RX ADMIN — VERAPAMIL HYDROCHLORIDE 120 MG: 120 TABLET, FILM COATED ORAL at 11:23

## 2019-01-01 RX ADMIN — Medication 2 PUFF: at 09:52

## 2019-01-01 RX ADMIN — TIOTROPIUM BROMIDE 18 MCG: 18 CAPSULE ORAL; RESPIRATORY (INHALATION) at 08:26

## 2019-01-01 RX ADMIN — Medication 10 ML: at 08:46

## 2019-01-01 RX ADMIN — INSULIN LISPRO 1 UNITS: 100 INJECTION, SOLUTION INTRAVENOUS; SUBCUTANEOUS at 09:35

## 2019-01-01 RX ADMIN — INSULIN LISPRO 1 UNITS: 100 INJECTION, SOLUTION INTRAVENOUS; SUBCUTANEOUS at 21:22

## 2019-01-01 RX ADMIN — DILTIAZEM HYDROCHLORIDE 5 MG/HR: 5 INJECTION INTRAVENOUS at 14:16

## 2019-01-01 RX ADMIN — FERROUS SULFATE TAB 325 MG (65 MG ELEMENTAL FE) 325 MG: 325 (65 FE) TAB at 10:39

## 2019-01-01 RX ADMIN — APIXABAN 2.5 MG: 2.5 TABLET, FILM COATED ORAL at 19:41

## 2019-01-01 RX ADMIN — Medication 10 ML: at 21:02

## 2019-01-01 RX ADMIN — HYDRALAZINE HYDROCHLORIDE 20 MG: 20 INJECTION INTRAMUSCULAR; INTRAVENOUS at 23:40

## 2019-01-01 RX ADMIN — EPINEPHRINE 1 MG: 0.1 INJECTION, SOLUTION ENDOTRACHEAL; INTRACARDIAC; INTRAVENOUS at 12:22

## 2019-01-01 RX ADMIN — SODIUM CHLORIDE 8 MG/HR: 9 INJECTION, SOLUTION INTRAVENOUS at 11:03

## 2019-01-01 RX ADMIN — INSULIN LISPRO 1 UNITS: 100 INJECTION, SOLUTION INTRAVENOUS; SUBCUTANEOUS at 13:03

## 2019-01-01 RX ADMIN — APIXABAN 2.5 MG: 2.5 TABLET, FILM COATED ORAL at 21:16

## 2019-01-01 RX ADMIN — HYDRALAZINE HYDROCHLORIDE 10 MG: 10 TABLET, FILM COATED ORAL at 15:16

## 2019-01-01 RX ADMIN — ISOSORBIDE MONONITRATE 30 MG: 30 TABLET ORAL at 09:08

## 2019-01-01 RX ADMIN — ONDANSETRON 4 MG: 2 INJECTION INTRAMUSCULAR; INTRAVENOUS at 16:37

## 2019-01-01 RX ADMIN — SODIUM BICARBONATE 650 MG: 650 TABLET ORAL at 07:44

## 2019-01-01 RX ADMIN — EPINEPHRINE 1 MG: 0.1 INJECTION, SOLUTION ENDOTRACHEAL; INTRACARDIAC; INTRAVENOUS at 11:45

## 2019-01-01 RX ADMIN — CLOPIDOGREL 75 MG: 75 TABLET, FILM COATED ORAL at 08:09

## 2019-01-01 RX ADMIN — METOPROLOL SUCCINATE 100 MG: 100 TABLET, FILM COATED, EXTENDED RELEASE ORAL at 07:44

## 2019-01-01 RX ADMIN — METOPROLOL SUCCINATE 100 MG: 100 TABLET, FILM COATED, EXTENDED RELEASE ORAL at 09:36

## 2019-01-01 RX ADMIN — METOPROLOL SUCCINATE 100 MG: 100 TABLET, FILM COATED, EXTENDED RELEASE ORAL at 08:43

## 2019-01-01 RX ADMIN — FUROSEMIDE 40 MG: 40 TABLET ORAL at 09:08

## 2019-01-01 RX ADMIN — HYDRALAZINE HYDROCHLORIDE 50 MG: 50 TABLET, FILM COATED ORAL at 21:21

## 2019-01-01 RX ADMIN — AMIODARONE HYDROCHLORIDE 0.5 MG/MIN: 1.8 INJECTION, SOLUTION INTRAVENOUS at 03:46

## 2019-01-01 RX ADMIN — IPRATROPIUM BROMIDE AND ALBUTEROL SULFATE 3 ML: .5; 3 SOLUTION RESPIRATORY (INHALATION) at 02:06

## 2019-01-01 RX ADMIN — SODIUM CHLORIDE, POTASSIUM CHLORIDE, SODIUM LACTATE AND CALCIUM CHLORIDE: 600; 310; 30; 20 INJECTION, SOLUTION INTRAVENOUS at 09:09

## 2019-01-01 RX ADMIN — APIXABAN 2.5 MG: 2.5 TABLET, FILM COATED ORAL at 11:16

## 2019-01-01 RX ADMIN — SODIUM CHLORIDE 8 MG/HR: 9 INJECTION, SOLUTION INTRAVENOUS at 06:35

## 2019-01-01 RX ADMIN — SODIUM BICARBONATE 650 MG: 650 TABLET ORAL at 09:09

## 2019-01-01 RX ADMIN — APIXABAN 2.5 MG: 2.5 TABLET, FILM COATED ORAL at 21:14

## 2019-01-01 RX ADMIN — SODIUM CHLORIDE, POTASSIUM CHLORIDE, SODIUM LACTATE AND CALCIUM CHLORIDE: 600; 310; 30; 20 INJECTION, SOLUTION INTRAVENOUS at 02:21

## 2019-01-01 RX ADMIN — DILTIAZEM HYDROCHLORIDE 5 MG/HR: 5 INJECTION INTRAVENOUS at 21:09

## 2019-01-01 RX ADMIN — APIXABAN 2.5 MG: 2.5 TABLET, FILM COATED ORAL at 20:09

## 2019-01-01 RX ADMIN — HEPARIN SODIUM 12 UNITS/KG/HR: 10000 INJECTION, SOLUTION INTRAVENOUS at 03:30

## 2019-01-01 RX ADMIN — FUROSEMIDE 5 MG/HR: 10 INJECTION, SOLUTION INTRAMUSCULAR; INTRAVENOUS at 22:04

## 2019-01-01 RX ADMIN — CLONIDINE HYDROCHLORIDE 0.1 MG: 0.1 TABLET ORAL at 20:10

## 2019-01-01 RX ADMIN — CLOPIDOGREL 75 MG: 75 TABLET, FILM COATED ORAL at 08:38

## 2019-01-01 RX ADMIN — ONDANSETRON 4 MG: 2 INJECTION INTRAMUSCULAR; INTRAVENOUS at 21:09

## 2019-01-01 RX ADMIN — SODIUM BICARBONATE 650 MG: 650 TABLET ORAL at 08:43

## 2019-01-01 RX ADMIN — APIXABAN 2.5 MG: 2.5 TABLET, FILM COATED ORAL at 21:21

## 2019-01-01 RX ADMIN — ASPIRIN 81 MG: 81 TABLET ORAL at 09:01

## 2019-01-01 RX ADMIN — CHLOROTHIAZIDE SODIUM 500 MG: 500 INJECTION, POWDER, LYOPHILIZED, FOR SOLUTION INTRAVENOUS at 10:05

## 2019-01-01 RX ADMIN — FUROSEMIDE 80 MG: 10 INJECTION, SOLUTION INTRAMUSCULAR; INTRAVENOUS at 21:13

## 2019-01-01 RX ADMIN — IRON SUCROSE 300 MG: 20 INJECTION, SOLUTION INTRAVENOUS at 12:19

## 2019-01-01 RX ADMIN — HYDRALAZINE HYDROCHLORIDE 10 MG: 10 TABLET, FILM COATED ORAL at 21:22

## 2019-01-01 RX ADMIN — CLONIDINE HYDROCHLORIDE 0.3 MG: 0.2 TABLET ORAL at 20:10

## 2019-01-01 RX ADMIN — SODIUM BICARBONATE 650 MG: 650 TABLET ORAL at 19:50

## 2019-01-01 RX ADMIN — NITROGLYCERIN 5 MCG/MIN: 20 INJECTION INTRAVENOUS at 06:21

## 2019-01-01 RX ADMIN — PROPOFOL 10 MCG/KG/MIN: 10 INJECTION, EMULSION INTRAVENOUS at 11:07

## 2019-01-01 RX ADMIN — Medication 2 PUFF: at 08:31

## 2019-01-01 RX ADMIN — Medication 10 ML: at 21:00

## 2019-01-01 RX ADMIN — CLONIDINE HYDROCHLORIDE 0.3 MG: 0.2 TABLET ORAL at 08:38

## 2019-01-01 RX ADMIN — Medication 10 ML: at 07:46

## 2019-01-01 RX ADMIN — VERAPAMIL HYDROCHLORIDE 120 MG: 120 TABLET, FILM COATED ORAL at 08:37

## 2019-01-01 RX ADMIN — ASPIRIN 81 MG: 81 TABLET ORAL at 08:09

## 2019-01-01 RX ADMIN — BUMETANIDE 2 MG: 1 TABLET ORAL at 12:29

## 2019-01-01 RX ADMIN — BUMETANIDE 2 MG: 0.25 INJECTION INTRAMUSCULAR; INTRAVENOUS at 09:55

## 2019-01-01 RX ADMIN — ONDANSETRON 4 MG: 2 INJECTION INTRAMUSCULAR; INTRAVENOUS at 10:04

## 2019-01-01 RX ADMIN — CLONIDINE HYDROCHLORIDE 0.3 MG: 0.2 TABLET ORAL at 07:44

## 2019-01-01 RX ADMIN — HEPARIN SODIUM 14 UNITS/KG/HR: 10000 INJECTION, SOLUTION INTRAVENOUS at 11:54

## 2019-01-01 RX ADMIN — CLOPIDOGREL BISULFATE 75 MG: 75 TABLET, FILM COATED ORAL at 09:38

## 2019-01-01 RX ADMIN — CLONIDINE HYDROCHLORIDE 0.3 MG: 0.2 TABLET ORAL at 09:36

## 2019-01-01 RX ADMIN — ASPIRIN 81 MG: 81 TABLET ORAL at 07:55

## 2019-01-01 RX ADMIN — Medication 2 PUFF: at 06:38

## 2019-01-01 RX ADMIN — Medication 10 ML: at 20:12

## 2019-01-01 RX ADMIN — CEFTRIAXONE SODIUM 1 G: 1 INJECTION, POWDER, FOR SOLUTION INTRAMUSCULAR; INTRAVENOUS at 09:49

## 2019-01-01 RX ADMIN — FERROUS SULFATE TAB 325 MG (65 MG ELEMENTAL FE) 325 MG: 325 (65 FE) TAB at 07:44

## 2019-01-01 RX ADMIN — SODIUM BICARBONATE 650 MG: 650 TABLET ORAL at 20:57

## 2019-01-01 RX ADMIN — PANTOPRAZOLE SODIUM 40 MG: 40 TABLET, DELAYED RELEASE ORAL at 07:56

## 2019-01-01 RX ADMIN — NITROGLYCERIN 1 INCH: 20 OINTMENT TOPICAL at 01:40

## 2019-01-01 RX ADMIN — METOPROLOL SUCCINATE 100 MG: 100 TABLET, FILM COATED, EXTENDED RELEASE ORAL at 08:09

## 2019-01-01 RX ADMIN — NITROGLYCERIN 160 MCG/MIN: 20 INJECTION INTRAVENOUS at 06:56

## 2019-01-01 RX ADMIN — Medication 10 ML: at 21:22

## 2019-01-01 RX ADMIN — INSULIN LISPRO 1 UNITS: 100 INJECTION, SOLUTION INTRAVENOUS; SUBCUTANEOUS at 17:22

## 2019-01-01 RX ADMIN — Medication 10 ML: at 08:39

## 2019-01-01 RX ADMIN — APIXABAN 2.5 MG: 2.5 TABLET, FILM COATED ORAL at 08:27

## 2019-01-01 RX ADMIN — SODIUM BICARBONATE 650 MG: 650 TABLET ORAL at 08:38

## 2019-01-01 RX ADMIN — ISOSORBIDE MONONITRATE 30 MG: 30 TABLET ORAL at 08:09

## 2019-01-01 RX ADMIN — HYDRALAZINE HYDROCHLORIDE 10 MG: 10 TABLET, FILM COATED ORAL at 09:38

## 2019-01-01 RX ADMIN — Medication 10 ML: at 19:50

## 2019-01-01 RX ADMIN — DOPAMINE HYDROCHLORIDE 11.1 ML: 40 INJECTION, SOLUTION, CONCENTRATE INTRAVENOUS at 12:36

## 2019-01-01 RX ADMIN — CLONIDINE HYDROCHLORIDE 0.3 MG: 0.2 TABLET ORAL at 19:49

## 2019-01-01 RX ADMIN — SODIUM BICARBONATE 1300 MG: 650 TABLET ORAL at 21:14

## 2019-01-01 RX ADMIN — INSULIN LISPRO 1 UNITS: 100 INJECTION, SOLUTION INTRAVENOUS; SUBCUTANEOUS at 20:55

## 2019-01-01 RX ADMIN — NITROGLYCERIN: 0.4 TABLET, ORALLY DISINTEGRATING SUBLINGUAL at 01:28

## 2019-01-01 RX ADMIN — SODIUM BICARBONATE 1300 MG: 650 TABLET ORAL at 15:41

## 2019-01-01 RX ADMIN — GLIMEPIRIDE 1 MG: 1 TABLET ORAL at 08:27

## 2019-01-01 RX ADMIN — SODIUM BICARBONATE 650 MG: 650 TABLET ORAL at 21:00

## 2019-01-01 RX ADMIN — FERROUS SULFATE TAB 325 MG (65 MG ELEMENTAL FE) 325 MG: 325 (65 FE) TAB at 08:38

## 2019-01-01 RX ADMIN — FUROSEMIDE 5 MG/HR: 10 INJECTION, SOLUTION INTRAMUSCULAR; INTRAVENOUS at 16:26

## 2019-01-01 RX ADMIN — NITROGLYCERIN 0.4 MG: 0.4 TABLET, ORALLY DISINTEGRATING SUBLINGUAL at 06:00

## 2019-01-01 RX ADMIN — CLOPIDOGREL BISULFATE 75 MG: 75 TABLET, FILM COATED ORAL at 09:53

## 2019-01-01 RX ADMIN — HYDRALAZINE HYDROCHLORIDE 20 MG: 20 INJECTION INTRAMUSCULAR; INTRAVENOUS at 23:55

## 2019-01-01 RX ADMIN — Medication 10 ML: at 07:39

## 2019-01-01 RX ADMIN — CLONIDINE HYDROCHLORIDE 0.3 MG: 0.2 TABLET ORAL at 08:10

## 2019-01-01 RX ADMIN — DILTIAZEM HYDROCHLORIDE 5 MG/HR: 5 INJECTION INTRAVENOUS at 06:57

## 2019-01-01 RX ADMIN — GLIMEPIRIDE 1 MG: 1 TABLET ORAL at 07:44

## 2019-01-01 RX ADMIN — SODIUM BICARBONATE 1300 MG: 650 TABLET ORAL at 21:22

## 2019-01-01 RX ADMIN — SODIUM BICARBONATE 1300 MG: 650 TABLET ORAL at 14:24

## 2019-01-01 RX ADMIN — CLONIDINE HYDROCHLORIDE 0.3 MG: 0.2 TABLET ORAL at 15:57

## 2019-01-01 RX ADMIN — EPINEPHRINE 1 MG: 0.1 INJECTION, SOLUTION ENDOTRACHEAL; INTRACARDIAC; INTRAVENOUS at 11:47

## 2019-01-01 RX ADMIN — IPRATROPIUM BROMIDE AND ALBUTEROL SULFATE 1 AMPULE: .5; 3 SOLUTION RESPIRATORY (INHALATION) at 16:04

## 2019-01-01 RX ADMIN — SENNOSIDES 17.2 MG: 8.6 TABLET, FILM COATED ORAL at 00:38

## 2019-01-01 RX ADMIN — EPINEPHRINE 1 MG: 0.1 INJECTION, SOLUTION ENDOTRACHEAL; INTRACARDIAC; INTRAVENOUS at 12:31

## 2019-01-01 ASSESSMENT — PAIN SCALES - GENERAL
PAINLEVEL_OUTOF10: 0
PAINLEVEL_OUTOF10: 0
PAINLEVEL_OUTOF10: 2
PAINLEVEL_OUTOF10: 0
PAINLEVEL_OUTOF10: 4
PAINLEVEL_OUTOF10: 0
PAINLEVEL_OUTOF10: 4
PAINLEVEL_OUTOF10: 0
PAINLEVEL_OUTOF10: 2
PAINLEVEL_OUTOF10: 0
PAINLEVEL_OUTOF10: 3
PAINLEVEL_OUTOF10: 0
PAINLEVEL_OUTOF10: 8
PAINLEVEL_OUTOF10: 0
PAINLEVEL_OUTOF10: 9
PAINLEVEL_OUTOF10: 5
PAINLEVEL_OUTOF10: 0
PAINLEVEL_OUTOF10: 7
PAINLEVEL_OUTOF10: 0
PAINLEVEL_OUTOF10: 1
PAINLEVEL_OUTOF10: 0

## 2019-01-01 ASSESSMENT — ENCOUNTER SYMPTOMS
WHEEZING: 0
CHEST TIGHTNESS: 0
BLOOD IN STOOL: 0
CONSTIPATION: 0
CONSTIPATION: 0
TROUBLE SWALLOWING: 0
TROUBLE SWALLOWING: 0
SORE THROAT: 0
VOICE CHANGE: 0
VOMITING: 0
RHINORRHEA: 0
BACK PAIN: 0
DIARRHEA: 0
EYE PAIN: 0
ABDOMINAL PAIN: 0
DIARRHEA: 0
SINUS PRESSURE: 0
VOMITING: 0
TROUBLE SWALLOWING: 0
SORE THROAT: 0
SINUS PRESSURE: 0
NAUSEA: 0
BACK PAIN: 0
DIARRHEA: 0
RHINORRHEA: 0
RHINORRHEA: 0
COUGH: 0
NAUSEA: 0
TROUBLE SWALLOWING: 0
TROUBLE SWALLOWING: 0
VOMITING: 0
SORE THROAT: 0
SINUS PRESSURE: 0
CONSTIPATION: 0
SHORTNESS OF BREATH: 0
VOICE CHANGE: 0
CHEST TIGHTNESS: 0
SHORTNESS OF BREATH: 0
WHEEZING: 1
VOMITING: 0
SINUS PRESSURE: 0
NAUSEA: 1
SHORTNESS OF BREATH: 0
COUGH: 1
BACK PAIN: 0
CHEST TIGHTNESS: 0
CONSTIPATION: 0
ABDOMINAL PAIN: 0
SHORTNESS OF BREATH: 1
BACK PAIN: 0
CHEST TIGHTNESS: 0
ABDOMINAL PAIN: 0
NAUSEA: 0
DIARRHEA: 0
BACK PAIN: 0
DIARRHEA: 0
COUGH: 0
NAUSEA: 0
SORE THROAT: 0
VOICE CHANGE: 0
WHEEZING: 1
ABDOMINAL PAIN: 0
VOICE CHANGE: 0
ABDOMINAL PAIN: 0
COUGH: 0
CONSTIPATION: 0
VOMITING: 0
WHEEZING: 0
WHEEZING: 0
COUGH: 1
CHEST TIGHTNESS: 0
SORE THROAT: 0
RHINORRHEA: 0

## 2019-01-01 ASSESSMENT — PAIN - FUNCTIONAL ASSESSMENT
PAIN_FUNCTIONAL_ASSESSMENT: 0-10
PAIN_FUNCTIONAL_ASSESSMENT: ACTIVITIES ARE NOT PREVENTED
PAIN_FUNCTIONAL_ASSESSMENT: ACTIVITIES ARE NOT PREVENTED
PAIN_FUNCTIONAL_ASSESSMENT: PREVENTS OR INTERFERES SOME ACTIVE ACTIVITIES AND ADLS

## 2019-01-01 ASSESSMENT — PAIN DESCRIPTION - FREQUENCY
FREQUENCY: CONTINUOUS
FREQUENCY: CONTINUOUS
FREQUENCY: INTERMITTENT
FREQUENCY: INTERMITTENT
FREQUENCY: CONTINUOUS
FREQUENCY: INTERMITTENT
FREQUENCY: CONTINUOUS
FREQUENCY: INTERMITTENT

## 2019-01-01 ASSESSMENT — PAIN DESCRIPTION - PAIN TYPE
TYPE: ACUTE PAIN
TYPE: CHRONIC PAIN
TYPE: CHRONIC PAIN
TYPE: ACUTE PAIN

## 2019-01-01 ASSESSMENT — PATIENT HEALTH QUESTIONNAIRE - PHQ9
2. FEELING DOWN, DEPRESSED OR HOPELESS: 0
1. LITTLE INTEREST OR PLEASURE IN DOING THINGS: 0
SUM OF ALL RESPONSES TO PHQ QUESTIONS 1-9: 0
SUM OF ALL RESPONSES TO PHQ QUESTIONS 1-9: 0
SUM OF ALL RESPONSES TO PHQ9 QUESTIONS 1 & 2: 0

## 2019-01-01 ASSESSMENT — PAIN DESCRIPTION - PROGRESSION
CLINICAL_PROGRESSION: NOT CHANGED
CLINICAL_PROGRESSION: GRADUALLY IMPROVING
CLINICAL_PROGRESSION: NOT CHANGED
CLINICAL_PROGRESSION: RAPIDLY WORSENING
CLINICAL_PROGRESSION: NOT CHANGED

## 2019-01-01 ASSESSMENT — PAIN DESCRIPTION - ORIENTATION
ORIENTATION: MID
ORIENTATION: LOWER
ORIENTATION: MID
ORIENTATION: LOWER
ORIENTATION: MID
ORIENTATION: LOWER

## 2019-01-01 ASSESSMENT — PAIN DESCRIPTION - LOCATION
LOCATION: CHEST
LOCATION: BACK
LOCATION: CHEST
LOCATION: CHEST

## 2019-01-01 ASSESSMENT — PAIN DESCRIPTION - DESCRIPTORS
DESCRIPTORS: DISCOMFORT
DESCRIPTORS: PRESSURE
DESCRIPTORS: PRESSURE;SHARP
DESCRIPTORS: DISCOMFORT
DESCRIPTORS: PRESSURE
DESCRIPTORS: DISCOMFORT

## 2019-01-01 ASSESSMENT — PAIN DESCRIPTION - ONSET
ONSET: ON-GOING
ONSET: AWAKENED FROM SLEEP
ONSET: ON-GOING

## 2019-01-01 ASSESSMENT — PULMONARY FUNCTION TESTS
PIF_VALUE: 27
PIF_VALUE: 20

## 2019-01-21 PROBLEM — K80.20 GALLSTONES: Status: RESOLVED | Noted: 2018-04-09 | Resolved: 2019-01-01

## 2019-01-21 PROBLEM — E87.20 METABOLIC ACIDOSIS: Status: RESOLVED | Noted: 2017-04-06 | Resolved: 2019-01-01

## 2019-01-21 PROBLEM — J81.1 PULMONARY EDEMA: Status: RESOLVED | Noted: 2018-04-12 | Resolved: 2019-01-01

## 2019-01-21 PROBLEM — N17.9 AKI (ACUTE KIDNEY INJURY) (HCC): Status: RESOLVED | Noted: 2018-01-01 | Resolved: 2019-01-01

## 2019-03-27 PROBLEM — Z11.59 NEED FOR HEPATITIS B SCREENING TEST: Status: ACTIVE | Noted: 2019-01-01

## 2019-03-27 PROBLEM — D50.8 IRON DEFICIENCY ANEMIA DUE TO DIETARY CAUSES: Status: ACTIVE | Noted: 2019-01-01

## 2019-04-05 NOTE — TELEPHONE ENCOUNTER
10 mins  Spoke to patient. Mailed lab reqs to home address to be drawn prior to Dr. [place] appointments. Patient questioned the hepatitis B series.

## 2019-04-05 NOTE — TELEPHONE ENCOUNTER
Chronic Care Management    Fabian Cancer is a 80 y. o.oldmale Is followed for Chronic Care Management for kidney disease,   Ane Mihai Choi's most recent   Lab Results   Component Value Date    CREATININE 3.8 01/24/2019    LABGLOM 15 01/24/2019   . Immunization History   Administered Date(s) Administered    Influenza Virus Vaccine 09/08/2011, 09/27/2012, 11/19/2013, 10/29/2014, 10/06/2015    Influenza, MDCK Quadv, IM, PF (Flucelvax 4 yrs and older) 11/09/2017    Influenza, Amy Padmaja, 3 yrs and older, IM, PF (Fluzone 3 yrs and older or Afluria 5 yrs and older) 10/25/2016    Influenza, Amy Padmaja, 6 mo and older, IM (Flulaval) 10/16/2018    Pneumococcal 13-valent Conjugate ([id number]) 10/07/2015    Pneumococcal Polysaccharide ([id number]) 09/08/2011       Diagnostic Data:    BMP:  Lab Results   Component Value Date     01/24/2019    K 4.7 01/24/2019    K 3.8 04/11/2018     01/24/2019    CO2 20 01/24/2019    BUN 51 01/24/2019    CREATININE 3.8 01/24/2019    LABGLOM 15 01/24/2019    GLUCOSE 85 01/24/2019    GLUCOSE 114 06/06/2012       Lab Results   Component Value Date    FERRITIN 321 04/28/2018    IRON 27 04/28/2018    LABIRON 13 04/28/2018    [id number] 521 10/25/2016    FOLATE > 20.0 10/25/2016     Lab Results   Component Value Date    RETICABS 0.8 10/25/2016     Lab Results   Component Value Date    HGB 10.6 05/31/2018    HGB 10.4 05/14/2018    HGB 9.9 04/30/2018    HGB 9.6 04/29/2018    HGB 6.9 04/27/2018     Hgb Goal  10-11 g/dl 4/28/18           Hgb  10.6           Aranesp -                       Ferritin 321           Iron 27           T sat 13           Folate 521           B12 >20           Retic Count 0.8                       Injectafer            Venofer            PO iron                             Assessment:    Diagnosis Orders   1. Anemia of chronic disease  Pt to get repeat H&H and iron studies ordered per Dr Ortiz People prior to appt next month   2.  Iron deficiency anemia due to dietary causes     3. Need for prophylactic vaccination against hepatitis B virus  Start Hep B vaccination series. Can get 1st dose on same day as his appt with Mesfin Borjas MD and 2nd dose on same day as appt with Dr Cate Boyle. Need paper signature.        GRACE Gardiner - CNP APRN    Time spent- 15 minutes non face-to-face

## 2019-04-05 NOTE — TELEPHONE ENCOUNTER
Pt wants to talk with Dr Kulwant Torrez before starting vaccination series. Please add Note to pt appt to ask Dr Kulwant Torrez about vaccination.

## 2019-04-25 NOTE — PROGRESS NOTES
Visit Date: 4/25/2019     Ying Beavers is a 80 y.o. male who presents today for:  Chief Complaint   Patient presents with    Hypertension    Diabetes         HPI:     Here for a follow-up     Diabetes   He presents for his follow-up diabetic visit. He has type 2 diabetes mellitus. His disease course has been stable. Pertinent negatives for hypoglycemia include no dizziness, headaches, sleepiness or sweats. Pertinent negatives for diabetes include no chest pain, no fatigue and no weakness. Symptoms are stable. Risk factors for coronary artery disease include diabetes mellitus, dyslipidemia, hypertension and male sex. Current diabetic treatment includes oral agent (monotherapy) (patient is not compliant with dieting, she eats whatever she wants). He is following a diabetic and generally healthy diet. His breakfast blood glucose range is generally 140-180 mg/dl. Hypertension   Pertinent negatives include no chest pain, headaches, neck pain, palpitations, shortness of breath or sweats. Coronary Artery Disease   Pertinent negatives include no chest pain, chest tightness, dizziness, leg swelling, palpitations or shortness of breath. Risk factors include hyperlipidemia. Hyperlipidemia   This is a chronic problem. The problem is controlled. Pertinent negatives include no chest pain, myalgias or shortness of breath.        Current Medications:  Current Outpatient Medications   Medication Sig Dispense Refill    atorvastatin (LIPITOR) 10 MG tablet Take 1 tablet by mouth daily 90 tablet 1    glimepiride (AMARYL) 1 MG tablet Take 1 tablet by mouth every morning (before breakfast) 90 tablet 1    verapamil (CALAN) 120 MG tablet Take 1 tablet by mouth daily 90 tablet 1    ELIQUIS 2.5 MG TABS tablet TAKE 1 TABLET BY MOUTH TWICE DAILY 60 tablet 2    verapamil (CALAN SR) 120 MG extended release tablet Take 120 mg by mouth nightly      cloNIDine (CATAPRES) 0.3 MG tablet TAKE 1 TABLET BY MOUTH THREE TIMES DAILY 270 tablet 2    apixaban (ELIQUIS) 2.5 MG TABS tablet Take 1 tablet by mouth 2 times daily 60 tablet 2    clopidogrel (PLAVIX) 75 MG tablet Take 1 tablet by mouth daily 90 tablet 1    blood glucose test strips (ONETOUCH VERIO) strip Check blood sugar once daily Dx: 250.00 100 each 3    vitamin E 400 UNIT capsule Take 1 capsule by mouth daily 90 capsule 3    metoprolol succinate (TOPROL XL) 100 MG extended release tablet Take 1 tablet by mouth every morning 30 tablet 3    ferrous sulfate 325 (65 Fe) MG tablet Take 325 mg by mouth daily (with breakfast)      ONETOUCH DELICA LANCETS 18I MISC Check blood sugar once daily. Dx: 250.00 100 each 1    Cholecalciferol (VITAMIN D PO) Take 2,000 Units by mouth daily        No current facility-administered medications for this visit. Subjective:     Review of Systems   Constitutional: Negative for appetite change, chills, diaphoresis, fatigue and fever. HENT: Negative for congestion, ear discharge, ear pain, postnasal drip, rhinorrhea, sinus pressure, sore throat, trouble swallowing and voice change. Respiratory: Negative for cough, chest tightness, shortness of breath and wheezing. Cardiovascular: Negative for chest pain, palpitations and leg swelling. Gastrointestinal: Negative for abdominal pain, constipation, diarrhea, nausea and vomiting. Musculoskeletal: Negative for arthralgias, back pain, joint swelling, myalgias, neck pain and neck stiffness. Skin: Negative for rash. Neurological: Negative for dizziness, syncope, weakness, light-headedness, numbness and headaches.        Objective:     /72 (Site: Left Upper Arm, Position: Sitting, Cuff Size: Medium Adult)   Pulse 58   Resp 12   Ht 5' 7\" (1.702 m)   Wt 145 lb (65.8 kg)   BMI 22.71 kg/m²   BP Readings from Last 3 Encounters:   04/25/19 130/72   03/28/19 (!) 200/72   02/21/19 (!) 172/60     Wt Readings from Last 3 Encounters:   04/25/19 145 lb (65.8 kg)   03/28/19 145 lb 9.6 oz (66 kg) 02/21/19 145 lb 8.1 oz (66 kg)        Physical Exam   Constitutional: He is oriented to person, place, and time. He appears well-developed and well-nourished. He is cooperative. HENT:   Head: Normocephalic and atraumatic. Right Ear: External ear normal.   Left Ear: External ear normal.   Nose: Nose normal.   Mouth/Throat: Oropharynx is clear and moist.   Eyes: Pupils are equal, round, and reactive to light. Conjunctivae and EOM are normal. No scleral icterus. Neck: Normal range of motion. Neck supple. No JVD present. No thyromegaly present. Cardiovascular: Normal rate, regular rhythm and intact distal pulses. Exam reveals no friction rub. No murmur heard. Pulmonary/Chest: Effort normal and breath sounds normal. He has no wheezes. He has no rales. He exhibits no tenderness. Abdominal: Soft. Bowel sounds are normal. He exhibits no mass. There is no tenderness. Musculoskeletal: He exhibits no edema. Lymphadenopathy:     He has no cervical adenopathy. Neurological: He is alert and oriented to person, place, and time. Skin: No rash noted. Vitals reviewed. Assessment:       Diagnosis Orders   1. Type 2 diabetes mellitus without complication, without long-term current use of insulin ([facility])  POCT Glucose    POCT glycosylated hemoglobin (Hb A1C)    Comprehensive Metabolic Panel    CBC Auto Differential    Lipid Panel   2. Diabetes mellitus due to underlying condition with stage 4 chronic kidney disease, unspecified whether long term insulin use ([facility] [address]     3. Essential hypertension     4. Anemia of chronic disease     5.  Chronic kidney disease (CKD), stage IV (severe) ([facility])         Plan:      Medications Prescribed:  Orders Placed This Encounter   Medications    atorvastatin (LIPITOR) 10 MG tablet     Sig: Take 1 tablet by mouth daily     Dispense:  90 tablet     Refill:  1    glimepiride (AMARYL) 1 MG tablet     Sig: Take 1 tablet by mouth every morning (before breakfast)     Dispense:  90 tablet     Refill:  1    verapamil (CALAN) 120 MG tablet     Sig: Take 1 tablet by mouth daily     Dispense:  90 tablet     Refill:  1     **Patient requests 90 days supply**       Orders Placed:  Orders Placed This Encounter   Procedures    Comprehensive Metabolic Panel     Laboratory Test to be done in 3 months     Standing Status:   Future     Standing Expiration Date:   4/24/2020    CBC Auto Differential     Laboratory Test to be done in 3 months     Standing Status:   Future     Standing Expiration Date:   4/24/2020    Lipid Panel     Laboratory Test to be done in 3 months     Standing Status:   Future     Standing Expiration Date:   4/24/2020     Order Specific Question:   Is Patient Fasting?/# of Hours     Answer:   12    POCT Glucose    POCT glycosylated hemoglobin (Hb A1C)     Lab Results   Component Value Date    LABA1C 5.1 04/25/2019    LABA1C 5.4 01/17/2019    LABA1C 5.2 10/16/2018     Lab Results   Component Value Date    LABMICR 41.43 01/28/2016    LDLCALC 68 11/06/2017    CREATININE 3.8 ([place]) 01/24/2019       Continue to monitor blood sugars 1 times a day. Return in about 3 months (around 7/25/2019) for DM. Discussed use, benefit, and side effectsof prescribed medications. All patient questions answered. Pt voiced understanding. Instructed to continue current medications, diet and exercise. Patient agreedwith treatment plan.

## 2019-04-25 NOTE — PROGRESS NOTES
BP Readings from Last 2 Encounters:   04/25/19 130/72   03/28/19 (!) 200/72     Hemoglobin A1C (%)   Date Value   01/17/2019 5.4     Microalbumin, Random Urine (mg/dl)   Date Value   01/28/2016 41.43     LDL Calculated (mg/dL)   Date Value   11/06/2017 68              Tobacco use:  Patient  reports that he quit smoking about 39 years ago. His smoking use included cigarettes. He has a 50.00 pack-year smoking history. He has never used smokeless tobacco.    If Smoker - Cessation materials given? No    Is Daily aspirin on medication list?:  Yes    Diabetic retinal exam done? No   If yes, document in Health Maintenance. Monofilament placed on counter? No    Shoes and socks removed? No    BP taken with correct size cuff? Yes   Repeated if > 140/90 NA     Is patient taking any medications for diabetes    Yes   If yes, see medication list    Is the patient reporting any side effects of diabetic medications? No    Microalbumin performed if applicable? No      Is patient taking any over the counter medications    Yes   If yes, see medication list      Patient Self-Management Goal for Chronic Condition  Goal: I will take all medications as prescribed by my doctor, and I will call the office if I am having any medication problems. Barriers to success: none  Plan for overcoming my barriers: N/A     Confidence: 10/10  Date goal set: 4/25/19  Date goal attained:     Have you seen any other physician or provider since your last visit no    Have you had any other diagnostic tests since your last visit? no    Have you changed or stopped any medications since your last visit including any over-the-counter medicines, vitamins, or herbal medicines? no     Are you taking all your prescribed medications?  Yes    If NO, why?

## 2019-04-26 PROBLEM — Z11.59 NEED FOR HEPATITIS B SCREENING TEST: Status: RESOLVED | Noted: 2019-01-01 | Resolved: 2019-01-01

## 2019-05-30 NOTE — TELEPHONE ENCOUNTER
Date of last visit:  4/25/2019  Date of next visit:  8/1/2019    Requested Prescriptions      No prescriptions requested or ordered in this encounter

## 2019-06-03 PROBLEM — Z23 NEED FOR PROPHYLACTIC VACCINATION AGAINST HEPATITIS B VIRUS: Status: ACTIVE | Noted: 2019-01-01

## 2019-06-03 NOTE — TELEPHONE ENCOUNTER
Chronic Care Management    Dany Bae is a 80 y. o.oldmale Is followed for Chronic Care Management for kidney disease,   Sahra Choi's most recent   Lab Results   Component Value Date    CREATININE 3.9 06/03/2019    LABGLOM 15 06/03/2019   . Immunization History   Administered Date(s) Administered    Influenza Virus Vaccine 09/08/2011, 09/27/2012, 11/19/2013, 10/29/2014, 10/06/2015    Influenza, MDCK Quadv, IM, PF (Flucelvax 4 yrs and older) 11/09/2017    Influenza, Marjorie Luis, 3 yrs and older, IM, PF (Fluzone 3 yrs and older or Afluria 5 yrs and older) 10/25/2016    Influenza, Marjorie Luis, 6 mo and older, IM (Flulaval) 10/16/2018    Pneumococcal 13-valent Conjugate (Sopdisg26) 10/07/2015    Pneumococcal Polysaccharide ([id number]) 09/08/2011       Diagnostic Data:    BMP:  Lab Results   Component Value Date     06/03/2019    K 4.9 06/03/2019    K 3.8 04/11/2018     06/03/2019    CO2 18 06/03/2019    BUN 49 06/03/2019    CREATININE 3.9 06/03/2019    LABGLOM 15 06/03/2019    GLUCOSE 77 06/03/2019    GLUCOSE 114 06/06/2012       Lab Results   Component Value Date    FERRITIN 66 06/03/2019    IRON 99 06/03/2019    LABIRON 37 06/03/2019    [id number] 521 10/25/2016    FOLATE > 20.0 10/25/2016     Lab Results   Component Value Date    RETICABS 0.8 10/25/2016     Lab Results   Component Value Date    HGB 8.8 06/03/2019    HGB 10.6 05/31/2018    HGB 10.4 05/14/2018    HGB 9.9 04/30/2018    HGB 9.6 04/29/2018     Hgb Goal  10-11 g/dl 4/28/18 5/13/19          Hgb  10.6 8.8          Aranesp -                       Ferritin 321 66          Iron 27 99          T sat 13 37          Folate 521           B12 >20           Retic Count 0.8                       Injectafer            Venofer            PO iron                             Assessment:    Diagnosis Orders   1.  Anemia of chronic disease  Drop in Hgb    Check Stool for occult blood x 2  Colonoscopy many years ago per Pt reports    Give Aranesp 60 mcg x 1. Repeat H&H in one month    Keep FU appt with Dr Sara Lopez later this month     2. Iron deficiency anemia due to dietary causes  Iron stores ok      3. Need for prophylactic vaccination against hepatitis B virus  Start Hep B vaccination series.         Claudene Mirza, APRN - CNP APRN    Time spent- 15 minutes non face-to-face

## 2019-06-05 NOTE — TELEPHONE ENCOUNTER
5 minutes  Everything has been authorized and ready to schedule   However. .. Patient states that he would like to wait to speak with dr Ciarra Morris at his next appointment [phone] in regards to getting the aranesp injections.     keyon Reyes  the hep b vaccine order needs a diagnosis code included on it as well as a physical signature all to be faxed to op nursing upon scheduling

## 2019-06-24 NOTE — PROGRESS NOTES
Renal Progress Note    Assessment and Plan:      Diagnosis Orders   1. CKD (chronic kidney disease), stage IV ([facility] [address]     2. Anemia of chronic disease     3. Essential hypertension     4. Diabetes mellitus due to underlying condition with stage 4 chronic kidney disease, unspecified whether long term insulin use ([address]     5. Metabolic acidosis     6. Vitamin D deficiency     7. Hyperparathyroidism, secondary renal ([address]       PLAN:    lab results discussed with patient and family  . They understood  Serum creatinine stable at 3.9 mg/dL. Vitamin D level is low at 27. PTH is improved to 164 from 185. Serum bicarbonate is low at 18. We will start sodium bicarbonate 650 mg twice a day. Increase vitamin D3 from 2000 international units daily to twice a day. Discontinue verapamil  Doxazosin 4 mg twice a day. Monitor blood pressure closely with changes.    Printed instruction on the changes given to the patient   Return visit in 3 months with labs       Patient Active Problem List   Diagnosis    Hyperlipidemia    BPH (benign prostatic hyperplasia)    CAD (coronary artery disease)    PAD (peripheral artery disease) ([facility] [address]    Atrial fibrillation with RVR ([facility] [address]    Essential hypertension    Diabetes mellitus with renal manifestation (HCC)    Vitamin D deficiency    Hyperparathyroidism, secondary renal ([facility] [address]    Non-ST elevation MI (NSTEMI) ([address]    Pancreatic mass    Systolic congestive heart failure (HCC)    Anemia in stage 4 chronic kidney disease (HCC)    A-fib (HCC)    Atypical atrial flutter (HCC)    Acute on chronic combined systolic and diastolic congestive heart failure (HCC)    CKD (chronic kidney disease), stage IV ([address]    Family history of secondary hyperparathyroidism    Hyperparathyroidism, secondary renal (HCC)    Anemia of chronic disease    Iron deficiency anemia due to dietary causes    Need for prophylactic vaccination against hepatitis B virus       Subjective:   Chief complaint:  Chief Complaint   Patient presents with    Chronic Kidney Disease     Stage V      HPI:This is a follow up visit for Mr Oscar Walter here today for a follow up appointment. Sees him for chronic kidney disease . Was last seen about 3 months with serum creatinine of 3.8 mg/dl and now 3.9 mg/dl. Monitors blood pressure at home and they are still high with systolic around 694'C    ROS:Constitutional: negative  Eyes: negative  Ears, nose, mouth, throat, and face: negative  Respiratory: negative  Cardiovascular: negative  Gastrointestinal: negative  Genitourinary:negative  Integument/breast: negative  Hematologic/lymphatic: negative  Musculoskeletal:positive for arthralgias and stiff joints  Neurological: negative  Behavioral/Psych: negative  Endocrine: negative  Allergic/Immunologic: negative  Medications:     Current Outpatient Medications   Medication Sig Dispense Refill    atorvastatin (LIPITOR) 10 MG tablet Take 1 tablet by mouth daily 90 tablet 1    glimepiride (AMARYL) 1 MG tablet Take 1 tablet by mouth every morning (before breakfast) 90 tablet 1    verapamil (CALAN) 120 MG tablet Take 1 tablet by mouth daily 90 tablet 1    ELIQUIS 2.5 MG TABS tablet TAKE 1 TABLET BY MOUTH TWICE DAILY 60 tablet 2    cloNIDine (CATAPRES) 0.3 MG tablet TAKE 1 TABLET BY MOUTH THREE TIMES DAILY 270 tablet 2    clopidogrel (PLAVIX) 75 MG tablet Take 1 tablet by mouth daily 90 tablet 1    blood glucose test strips (ONETOUCH VERIO) strip Check blood sugar once daily Dx: 250.00 100 each 3    vitamin E 400 UNIT capsule Take 1 capsule by mouth daily 90 capsule 3    metoprolol succinate (TOPROL XL) 100 MG extended release tablet Take 1 tablet by mouth every morning 30 tablet 3    ferrous sulfate 325 (65 Fe) MG tablet Take 325 mg by mouth daily (with breakfast)      ONETOUCH DELICA LANCETS 00F MISC Check blood sugar once daily.  Dx: 250.00 100 each 1    Cholecalciferol (VITAMIN D PO) Take 2,000 Units by mouth daily No current facility-administered medications for this visit.         Lab Results:    CBC:   Lab Results   Component Value Date    WBC 3.9 (L) 05/31/2018    HGB 8.8 (L) 06/03/2019    HCT 27.5 (L) 06/03/2019    MCV 90.4 05/31/2018     05/31/2018     BMP:    Lab Results   Component Value Date     06/03/2019     01/24/2019     11/16/2018    K 4.9 06/03/2019    K 4.7 01/24/2019    K 4.7 11/16/2018     06/03/2019     01/24/2019     11/16/2018    CO2 18 (L) 06/03/2019    CO2 20 (L) 01/24/2019    CO2 19 (L) 11/16/2018    BUN 49 (H) 06/03/2019    BUN 51 (H) 01/24/2019    BUN 39 (H) 11/16/2018    CREATININE 3.9 (HH) 06/03/2019    CREATININE 3.8 (HH) 01/24/2019    CREATININE 3.3 (HH) 11/16/2018    GLUCOSE 77 06/03/2019    GLUCOSE 112 04/25/2019    GLUCOSE 85 01/24/2019      Hepatic:   Lab Results   Component Value Date    AST 16 05/31/2018    AST 20 04/30/2018    AST 15 04/23/2018    ALT 10 (L) 05/31/2018    ALT 18 04/30/2018    ALT 22 04/23/2018    BILITOT 0.5 05/31/2018    BILITOT 0.6 04/30/2018    BILITOT 0.5 04/23/2018    ALKPHOS 139 (H) 05/31/2018    ALKPHOS 140 (H) 04/30/2018    ALKPHOS 213 (H) 04/23/2018     BNP: No results found for: BNP  Lipids:   Lab Results   Component Value Date    CHOL 140 11/06/2017    HDL 44 11/06/2017     INR:   Lab Results   Component Value Date    INR 1.15 (H) 04/30/2018    INR 1.39 (H) 04/23/2018    INR 1.02 04/09/2018     URINE:   Lab Results   Component Value Date    NAUR 68 04/10/2018    PROTUR 101.5 03/29/2018     Lab Results   Component Value Date    NITRU NEGATIVE 04/30/2018    COLORU YELLOW 05/01/2018    COLORU YELLOW 04/30/2018    PHUR 5.5 04/30/2018    LABCAST NONE SEEN 04/30/2018    LABCAST NONE SEEN 04/30/2018    WBCUA 0-2 04/30/2018    RBCUA 0-2 04/30/2018    YEAST NONE SEEN 04/30/2018    BACTERIA NONE 04/30/2018    SPECGRAV 1.016 04/30/2018    LEUKOCYTESUR NEGATIVE 04/30/2018    UROBILINOGEN 0.2 04/30/2018    BILIRUBINUR NEGATIVE 04/30/2018    BLOODU NEGATIVE 04/30/2018    GLUCOSEU NEGATIVE 04/23/2018    KETUA NEGATIVE 04/30/2018      Microalbumen/Creatinine ratio:  No components found for: RUCREAT    Objective:   Vitals: BP (!) 156/65 (Site: Left Upper Arm, Position: Sitting, Cuff Size: Medium Adult)   Pulse 56   Wt 144 lb 9.6 oz (65.6 kg)   SpO2 100%   BMI 22.65 kg/m²      Constitutional:  Alert, awake, no apparent distress  Skin:normal  HEENT:Pupils are reactive . Throat is clear  Neck:supple with no thyromegally  Cardiovascular:  S1, S2 without murmur  Respiratory:  Clear  Abdomen: +bs, soft, non tender  Ext: No LE edema  Musculoskeletal:Intact  Neuro:Alert and oriented with no deficit    Electronically signed by Alli Ramsey MD on 6/24/2019 at 10:06 AM

## 2019-07-08 NOTE — PROGRESS NOTES
0915 ALERT MALE ADMITTED FOR ARANESP PROCEDURE REVIEWED WITH PT VERBALIZED UNDERSTANDING. Pt rights and responsibilities offered to pt to read.       __MET__ Safety:       (Environmental)   [place] to environment   Ensure ID band is correct and in place/ allergy band as needed   Assess for fall risk   Initiate fall precautions as applicable (fall band, side rails, etc.)   Call light within reach   Bed in low position/ wheels locked    __MET_ Pain:        Assess pain level and characteristics   Administer analgesics as ordered   Assess effectiveness of pain management and report to MD as needed    _MET___ Knowledge Deficit:   Assess baseline knowledge   Provide teaching at level of understanding   Provide teaching via preferred learning method   Evaluate teaching effectiveness    [address] Electronic[facility], [address]

## 2019-07-09 NOTE — TELEPHONE ENCOUNTER
Called and gave Any Jaramillo this information-she wants the Metolazone sent to [facility] on [address]-script sent

## 2019-07-12 PROBLEM — N28.9 KIDNEY DISEASE WITH FLUID RETENTION: Status: ACTIVE | Noted: 2019-01-01

## 2019-07-12 PROBLEM — I50.9 ACUTE CONGESTIVE HEART FAILURE (HCC): Status: ACTIVE | Noted: 2019-01-01

## 2019-07-12 NOTE — ED PROVIDER NOTES
METABOLIC PANEL - Abnormal; Notable for the following components:    CO2 20 (*)     Glucose 134 (*)     BUN 50 (*)     CREATININE 4.0 (*)     All other components within normal limits   HEPATIC FUNCTION PANEL - Abnormal; Notable for the following components:    Alkaline Phosphatase 161 (*)     All other components within normal limits   TROPONIN - Abnormal; Notable for the following components:    Troponin T 0.078 (*)     All other components within normal limits   BRAIN NATRIURETIC PEPTIDE - Abnormal; Notable for the following components:    Pro-BNP 9800.0 (*)     All other components within normal limits   URINALYSIS WITH MICROSCOPIC - Abnormal; Notable for the following components:    Blood, Urine TRACE (*)     Protein,  (*)     All other components within normal limits   ANION GAP - Abnormal; Notable for the following components:    Anion Gap 17.0 (*)     All other components within normal limits   GLOMERULAR FILTRATION RATE, ESTIMATED - Abnormal; Notable for the following components:    Est, Glom Filt Rate 14 (*)     All other components within normal limits   OSMOLALITY - Abnormal; Notable for the following components:    Osmolality Calc 300.3 (*)     All other components within normal limits   MAGNESIUM       Radiology:    XR CHEST PORTABLE   Final Result   Mild interstitial prominence for which interstitial edema is not excluded. **This report has been created using voice recognition software. It may contain minor errors which are inherent in voice recognition technology. **      Final report electronically signed by Dr. Bethany Morales on 7/12/2019 5:05 PM          EKG:    Vent.  Rate: 72 bpm  GA interval: 160 ms  QRS duration: 82 ms  QTc: 455 ms  P-R-T axes: 73, -47, 62  Normal sinus rhythm   Possible Left atrial enlargement  Left axis deviation  Possible Anterior infarct, age undetermined   No STEMI    Emergency Department Procedures:      ED Course and MDM:    Tai Benton is a 80

## 2019-07-12 NOTE — ED TRIAGE NOTES
Pt presents to ED at the request of Dr. Betzaida Maldonado for # weight gain. Pt has hx of CHF. Pt denies chest pain. States he has sob with activity.

## 2019-07-13 NOTE — H&P
Monocytes 7.8 %    Eosinophils 7.3 %    Basophils 0.6 %    Immature Granulocytes 0.3 %    Segs Absolute 5.5 1.8 - 7.7 thou/mm3    Lymphocytes # 1.2 1.0 - 4.8 thou/mm3    Monocytes # 0.6 0.4 - 1.3 thou/mm3    Eosinophils # 0.6 (H) 0.0 - 0.4 thou/mm3    Basophils # 0.0 0.0 - 0.1 thou/mm3    Immature Grans (Abs) 0.02 0.00 - 0.07 thou/mm3    nRBC 0 /100 wbc   Urinalysis with microscopic    Collection Time: 07/12/19  5:14 PM   Result Value Ref Range    Glucose, Urine NEGATIVE NEGATIVE mg/dl    Bilirubin Urine NEGATIVE NEGATIVE    Ketones, Urine NEGATIVE NEGATIVE    Specific Gravity, UA 1.011 1.002 - 1.03    Blood, Urine TRACE (A) NEGATIVE    pH, UA 6.5 5.0 - 9.0    Protein,  (A) NEGATIVE mg/dl    Urobilinogen, Urine 1.0 0.0 - 1.0 eu/dl    Nitrite, Urine NEGATIVE NEGATIVE    Leukocyte Esterase, Urine NEGATIVE NEGATIVE    Color, UA YELLOW YELLOW-STR    Character, Urine CLEAR CLR-SL.YG    RBC, UA 0-2 0-2/hpf /hpf    WBC, UA NONE SEEN 0-4/hpf /hpf    Epi Cells NONE SEEN 3-5/hpf /hpf    Bacteria, UA NONE FEW/NONE S    Casts NONE SEEN NONE SEEN /lpf    Crystals NONE SEEN NONE SEEN    Renal Epithelial, Urine NONE SEEN NONE SEEN    Yeast, UA NONE SEEN NONE SEEN    Casts NONE SEEN /lpf    Miscellaneous Lab Test Result NONE SEEN        Radiology:     Xr Chest Portable    Result Date: 7/12/2019  PROCEDURE: XR CHEST PORTABLE CLINICAL INFORMATION: Overdose. COMPARISON: May 2, 2018 TECHNIQUE: Portable chest. FINDINGS: No lobar consolidation. Costophrenic angles are preserved. Degenerative changes thoracic spine. Mild interstitial prominence for which interstitial edema is not excluded. Right axillary clips. Mild cardiomegaly. Mild interstitial prominence for which interstitial edema is not excluded. **This report has been created using voice recognition software. It may contain minor errors which are inherent in voice recognition technology. ** Final report electronically signed by Dr. Lopez Ward on 7/12/2019 5:05

## 2019-07-13 NOTE — CONSULTS
He feels better now. No chest pain. No headache. No fever or chills. Past Medical History:   Diagnosis Date    Anemia, iron deficiency 10/2016    Arthritis     Atonic bladder     Atrial fibrillation (HCC)     BPH (benign prostatic hyperplasia)     Bronchitis     CAD (coronary artery disease)     CKD (chronic kidney disease) stage 3, GFR 30-59 ml/min (HCC)     Gallstones 04/09/2018    Hard of hearing     Hyperlipidemia 2000    Hypertension 1990s    Melanoma ([facility] [address] 6/10    PAD (peripheral artery disease) ([facility] [address] 10/14    Proteinuria     Renal insufficiency 1998    Type II or unspecified type diabetes mellitus without mention of complication, not stated as uncontrolled 12/00       Past Surgical History:   Procedure Laterality Date    DENTAL SURGERY      ERCP N/A 4/11/2018    ERCP STENT INSERTION performed by Terri Pelaez MD [facility]    ERCP  4/11/2018    ERCP STONE REMOVAL performed by Terri Pelaez MD at [facility] [address] Left 12/19/13    canthoplasy    HERNIA REPAIR Bilateral 9/22/14    Bilateral Inguinal Hernia Repair with Mesh    LYMPHADENECTOMY      MOHS SURGERY Right 8/29/13    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 His smoking use included cigarettes. He has a 50.00 pack-year smoking history. He has never used smokeless tobacco. He reports that he does not drink alcohol or use drugs.     Allergies:  Diatrizoate; Piperacillin-tazobactam in dex; and Dye [iodides]    Current Medications:      cloNIDine (CATAPRES) tablet 0.3 mg TID   clopidogrel (PLAVIX) tablet 75 mg Daily   apixaban (ELIQUIS) tablet 2.5 mg BID   ferrous sulfate tablet 325 mg Daily with breakfast   glimepiride (AMARYL) tablet 1 mg QAM AC metoprolol succinate (TOPROL XL) extended release tablet 100 mg QAM   sodium bicarbonate tablet 650 mg BID   sodium chloride flush 0.9 % injection 10 mL 2 times per day   sodium chloride flush 0.9 % injection 10 mL PRN   magnesium hydroxide (MILK OF MAGNESIA) 400 MG/5ML suspension 30 mL Daily PRN   ondansetron (ZOFRAN) injection 4 mg Q6H PRN   acetaminophen (TYLENOL) tablet 650 mg Q4H PRN   hydrALAZINE (APRESOLINE) injection 10 mg Q4H PRN   glucose (GLUTOSE) 40 % oral gel 15 g PRN   dextrose 50 % IV solution PRN   glucagon (rDNA) injection 1 mg PRN   dextrose 5 % solution PRN   furosemide (LASIX) 100 mg in dextrose 5 % 100 mL infusion Continuous   nitroGLYCERIN 50 mg in dextrose 5% 250 mL infusion Continuous       Review of Systems:   Pertinent positives stated above in HPI. All other systems were reviewed and were negative. ROS:Constitutional: negative  Eyes: negative  Ears, nose, mouth, throat, and face: negative  Respiratory: positive for dyspnea on exertion and shortness of breath  Cardiovascular: positive for dyspnea, irregular heart beat and lower extremity edema  Gastrointestinal: negative  Genitourinary:negative  Integument/breast: negative  Hematologic/lymphatic: positive for easy bruising  Musculoskeletal:positive for arthralgias and stiff joints  Neurological: positive for coordination problems and gait problems  Behavioral/Psych: negative  Endocrine: negative  Allergic/Immunologic: negative    Physical exam:   Constitutional: Chronically ill looking elderly gentleman in no distress. Vitals:   Vitals:    07/13/19 1015   BP: (!) 145/66   Pulse:    Resp:    Temp:    SpO2:        Skin: no rash, turgor is normal.  Heent: Pupils are reactive to light. Throat is clear. Oral mucosa is moist.  Neck: no bruits or jvd noted  Cardiovascular: Irregular. Respiratory: Decreased breath sound. Abdomen: Soft. Good bowel sounds. Nontender. No palpable mass. No abdominal bruit.   Ext: 2+ lower extremity

## 2019-07-13 NOTE — ED NOTES
Respirations remain much less labored. Pt sat back into bed and tolerating well.       Jeannette Milian RN  07/12/19 5875

## 2019-07-14 NOTE — PROGRESS NOTES
edema  Musculoskeletal:Intact  Neuro:Alert, awake and oriented with no deficit      Electronically signed by Romina Hua MD on 7/14/2019 at 8:15 AM

## 2019-07-14 NOTE — FLOWSHEET NOTE
07/14/19 0436   Provider Notification   Reason for Communication Critical Value (comment)  (/87)   Provider Name    Provider Notification Physician   Method of Communication Secure Message   Response Waiting for response   Notification Time 84 17 85   Shift Event Other (comment)     84 17 85 /97, prn hydralazine given BP rechecked 1 hr later 195/87. Pt has no other prn BP meds. Takes scheduled Toprol XL & clonidine. Did you want anything given? Message read.

## 2019-07-14 NOTE — PLAN OF CARE
Problem: Falls - Risk of:  Goal: Will remain free from falls  Description  Will remain free from falls  Outcome: Ongoing  Note:   Absent of falls this shift. Oriented to surroundings. Up with help. Problem: Pain:  Description  Pain management should include both nonpharmacologic and pharmacologic interventions. Goal: Pain level will decrease  Description  Pain level will decrease  Outcome: Ongoing  Note:   Pain Assessment: 0-10  Pain Level: 0   Pain goal:  0   Is pain goal met at this time? Yes     Additional interventions to be implemented: position change and rest         Problem: Discharge Planning:  Goal: Discharged to appropriate level of care  Description  Discharged to appropriate level of care  Outcome: Ongoing  Note:   Pt plans to be discharged to private residence. Problem: Bowel Function - Altered:  Goal: Bowel elimination is within specified parameters  Description  Bowel elimination is within specified parameters  Outcome: Ongoing  Note:   BS active x4. Pt passing gas. Denies abd tenderness. Problem: Cardiac Output - Decreased:  Goal: Hemodynamic stability will improve  Description  Hemodynamic stability will improve  Outcome: Ongoing  Note:   Vitals:    07/13/19 2349   BP: (!) 184/84   Pulse: 67   Resp: 16   Temp: 98.1 °F (36.7 °C)   SpO2: 94%          Problem: Fluid Volume - Imbalance:  Goal: Absence of imbalanced fluid volume signs and symptoms  Description  Absence of imbalanced fluid volume signs and symptoms  Outcome: Ongoing  Note:   1+ pitting edema in BLE. Urinating adequately. Lasix gtt infusing. Problem: Nutrition Deficit:  Goal: Ability to achieve adequate nutritional intake will improve  Description  Ability to achieve adequate nutritional intake will improve  Outcome: Ongoing  Note:   Nutrition appears to be inadequate.         Problem: Serum Glucose Level - Abnormal:  Goal: Ability to maintain appropriate glucose levels will improve to within specified

## 2019-07-15 NOTE — FLOWSHEET NOTE
07/15/19 1312   Encounter Summary   Services provided to: Patient and family together   Referral/Consult From: [address]; Children   Continue Visiting Yes  (7/15)   Complexity of Encounter Moderate   Length of Encounter 15 minutes   Routine   Type Initial   Assessment Approachable   Intervention Prayer;Nurtured hope   Outcome Hopeful;Coping   Advance Directives (For Healthcare)   Healthcare Directive No, patient does not have an advance directive for healthcare treatment   Information on [place] Requested No   Patient Requests Assistance Yes, referral made to    Advance Directives Documents given;Documents explained;Pt. not interested at this time   Advance Directive Consult: Advance Directive materials were provided to patient and explained. Patient is not ready to complete at this time, gave information for Spiritual Care to be contacted if further assistance is needed. Pt would like to wait until his daughter arrives before doing the AD. Pt and his wife wanted prayer and a blessing. Follow up is needed.

## 2019-07-15 NOTE — PROGRESS NOTES
Renal Progress Note    Assessment and Plan:    1. Acute kidney injury from diuretic with serum creatinine increase again today but slightly so  2. Volume overload completely resolved  3. Stage IV chronic kidney disease. 4.  Hypertension primary with variable control  5. Metabolic acidosis from chronic kidney disease resolving  6. Atrial fibrillation with controlled ventricular rate  7. Diabetes mellitus type 2  8. Deconditioning  PLAN:  Reviewed labs   Serum creatinine slightly increased today  Urine analysis is negative  Hold bumetanide for now  AM labs   Will follow     Patient Active Problem List:     Hyperlipidemia     BPH (benign prostatic hyperplasia)     CAD (coronary artery disease)     PAD (peripheral artery disease) (HCC)     Atrial fibrillation with RVR ([facility] [address]     Essential hypertension     Diabetes mellitus with renal manifestation (HCC)     Vitamin D deficiency     Hyperparathyroidism, secondary renal (HCC)     Non-ST elevation MI (NSTEMI) (HCC)     Pancreatic mass     Systolic congestive heart failure (HCC)     Anemia in stage 4 chronic kidney disease (HCC)     A-fib (HCC)     Atypical atrial flutter (HCC)     Acute on chronic combined systolic and diastolic congestive heart failure (HCC)     CKD (chronic kidney disease), stage IV (HCC)     Family history of secondary hyperparathyroidism     Hyperparathyroidism, secondary renal (HCC)     Anemia of chronic disease     Iron deficiency anemia due to dietary causes     Need for prophylactic vaccination against hepatitis B virus     Acute congestive heart failure ([facility] [address]     Kidney disease with fluid retention      Subjective:   Admit Date: 7/12/2019    Interval History:   Seeing for acute kidney injury on chronic kidney injury   Very sleepy this morning.   Updated by the staff  No new issues of concern  Blood pressure is variable  Good urine output         Medications:   Scheduled Meds:   bumetanide  2 mg Oral Daily    cloNIDine  0.3 mg Oral TID   

## 2019-07-16 NOTE — PROGRESS NOTES
Pt is eating supper and not wearing bipap at this time. Message was sent to  to see if we can discontinue order since pt hasn't wore in 3 days.

## 2019-07-16 NOTE — PROGRESS NOTES
[facility]      Progress Note  [facility] ICU STEPDOWN TELEMETRY 4K       Patient: Irineo Franks  Unit/Bed: 3K-33/5-A  YOB: 1933  MRN: [id number]  Acct: [de-identified]   Admitting Diagnosis: Acute congestive heart failure, unspecified heart failure type [facility]) [I50.9]  Acute congestive heart failure, unspecified heart failure type [facility]) [I50.9]  Admit Date:  7/12/2019  Hospital Day: 3    Interval History:    Patient is on admission for pulmonary edema and generalized body swelling as a consequence of severe kidney disease and currently being treated with diuretics. The following Services are consulting: Nephrology     Chart, Labs, Radiology studies, and Consults reviewed. Subjective:  Patient seen and examined   There are no new complaints today.     Objective:   BP (!) 121/59   Pulse 70   Temp 98.2 °F (36.8 °C) (Oral)   Resp 18   Ht 5' 10\" (1.778 m)   Wt 136 lb 1.6 oz (61.7 kg)   SpO2 98%   BMI 19.53 kg/m²       Intake/Output Summary (Last 24 hours) at 7/15/2019 2137  Last data filed at 7/15/2019 2013  Gross per 24 hour   Intake 570 ml   Output 750 ml   Net -180 ml        Physical Examination:   General appearance: alert, appears stated age, cooperative and in no distress  Lungs: rales bibasilar and bilaterally  Heart: regular rate and rhythm, S1, S2 normal, no murmur, click, rub or gallop  Abdomen: normal findings: bowel sounds normal and no masses palpable and abnormal findings:  abdominal fullness  Extremities: resolving bilateral leg edema 1+  Pulses: 2+ and symmetric  Skin: Skin color, texture, turgor normal. No rashes or lesions  Neurologic: Grossly normal    [place]:  Drains:  Central Line/port:   EKG:  Imaging:      Scheduled Meds:   cloNIDine  0.3 mg Oral TID    clopidogrel  75 mg Oral Daily    apixaban  2.5 mg Oral BID    ferrous sulfate  325 mg Oral Daily with breakfast    glimepiride  1 mg Oral QAM AC    metoprolol succinate  100 mg Oral QAM    sodium

## 2019-07-16 NOTE — PLAN OF CARE
regarding decision  Outcome: Ongoing     Problem: Pain:  Goal: Pain level will decrease  Description  Pain level will decrease  Outcome: Completed     Problem: Pain:  Goal: Control of acute pain  Description  Control of acute pain  Outcome: Completed     Problem: Pain:  Goal: Control of chronic pain  Description  Control of chronic pain  Outcome: Completed   Care plan reviewed with patient and wife. Patient and wife verbalize understanding of the plan of care and contribute to goal setting.

## 2019-07-17 NOTE — PROGRESS NOTES
kg)   04/25/19 145 lb (65.8 kg)      24HR INTAKE/OUTPUT:      Intake/Output Summary (Last 24 hours) at 7/17/2019 1744  Last data filed at 7/17/2019 1407  Gross per 24 hour   Intake 1482.11 ml   Output 990 ml   Net 492.11 ml       Constitutional:  Alert, awake, no apparent distress   Skin:normal   HEENT:Pupils are reactive . Throat is clear   Neck:supple with no thyromegaly  Cardiovascular: Irregular  Respiratory: Clear  Abdomen: +bs, soft, nontender  Ext: No LE edema  Musculoskeletal:Intact  Neuro:Alert, awake and oriented with no deficit and speech is normal      Electronically signed by Naveed Smiley MD on 7/17/2019 at 5:44 PM

## 2019-07-18 NOTE — PROGRESS NOTES
ausculation bilaterally. Equal breath sounds. No wheezes. No shortness of breath noted at rest.  ABDOMEN: soft, non tender  NEUROLOGICAL: Patient is alert and oriented to person, place, and time. Recent and remote memory intact. Thought is coherant. SKIN: no rash, No significant bruises on exposed surfaces  MUSCULOSKELETAL: Movement is coordinated. Moves all extremities   EXTREMITIES: Distal lower extremity temp is warm, No lower extremity edema. PSYCHIATRIC: mood and affect appropriate. Medications:   Med reviewed  Scheduled Meds:   verapamil  120 mg Oral Daily    metoprolol succinate  50 mg Oral QAM    iron sucrose  300 mg Intravenous Q48H    cloNIDine  0.3 mg Oral TID    clopidogrel  75 mg Oral Daily    apixaban  2.5 mg Oral BID    ferrous sulfate  325 mg Oral Daily with breakfast    glimepiride  1 mg Oral QAM AC    sodium bicarbonate  650 mg Oral BID    sodium chloride flush  10 mL Intravenous 2 times per day     Labs:   Labs reviewed  Recent Labs     07/16/19  0542 07/17/19  0538 07/18/19  0525    145 139   K 3.2* 4.7 3.8   CL 98 103 102   CO2 20* 23 21*   BUN 81* 81* 71*   CREATININE 5.2* 5.2* 5.2*   LABGLOM 11* 11* 11*   GLUCOSE 126* 86 80   CALCIUM 8.5 9.0 8.5     No results for input(s): WBC, RBC, HGB, HCT, MCV, MCH, RDW, PLT in the last 72 hours. ECHO 5/31/18   Ejection fraction is visually estimated at 45%.   There was mild global hypokinesis of the left ventricle.   Left atrial size was normal with no thrombus identified. APPENDAGE: The   left atrial appendage size was normal with no thrombus identified.   DOPPLER: The function was normal (normal emptying velocity).   Moderate mitral regurgitation is present.     ASSESSMENT:  1. Acute Kidney Injury 2nd to diuresis. Ok to continue LR at 50/hr  2. Chronic Kidney Disease Stage IV. 3. Acute on chronic systolic heart failure with EF 45%, Repeat echo pending   4.  Anemia of chronic disease and iron deficiency, Venofer 300 mg IVPB q48hr x 3 doses. Aranesp 60 mcg on 7/18  5. Essential Hypertension with nephrosclerosis,   6. Diabetes Mellitus Type II with nephrosclerosis without long term use of insulin   7. Metabolic acidosis 2nd to GLENYS/CKD. PO bicarb  8. Atrial fib with RVR    BMP in AM  Principal Problem:    Kidney disease with fluid retention  Active Problems:    Acute congestive heart failure (HCC)    GLENYS (acute kidney injury) (HCC)    CKD (chronic kidney disease) stage 4, GFR 15-29 ml/min (HCC)    Metabolic acidosis    Hypervolemia  Resolved Problems:    * No resolved hospital problems.  GRACE Gil - CNP 10:06 AM 7/18/2019

## 2019-07-18 NOTE — PROGRESS NOTES
flutter with RVR   -controlled with home medications (verapamil and metoprolol)    eliquis started    Acute kidney injury: from diuresis   Elevated troponin: chronic   Anemia of chronic disease   HTN   DMII      Plan:  Stress test OP   Continue current medical therapy     Ok to DC from cariology standpoint          Electronically signed by GRACE Goldman CNP on 7/18/2019 at 1:15 PM

## 2019-07-18 NOTE — CONSULTS
[address] [place] [address]                                  CONSULTATION    PATIENT NAME: Paulina Nichols                 :        09/15/1933  MED REC NO:   [id number]                           ROOM:       0028  ACCOUNT NO:   [de-identified]                           ADMIT DATE: 2019  PROVIDER:     Kendal Singh. Richmond Gotti M.D.     [place]oklyn Bliss:  2019    REASON FOR CONSULTATION:  Atrial fibrillation with rapid ventricular  response this morning, an 80year-old gentleman admitted with congestive  heart failure, who was on diuresis with cardiorenal syndrome. PRIMARY CARDIOLOGIST: Violet Roe MD    HISTORY OF PRESENT ILLNESS:  This is an 78-year-old gentleman, known to  have chronic kidney disease stage 5, hypertension, and paroxysmal atrial  fibrillation, presented on 2019 because of generalized body  weakness, leg swelling, and shortness of breath with orthopnea and he  stated he has gained 5 pounds since in the last few weeks prior to his  admission and his blood pressure was poorly controlled and so he was  also having multiple medication for blood pressure control, so in view  of that, the patient came in and admitted with a diagnostic impression  of acute CHF exacerbation with cardiorenal syndrome and has been on  aggressive diuresis with diuretics IV drip and with that, the patient  got better and swelling resolved and today, he went into a atrial  fibrillation with rapid ventricular response and was given his home  medication, Toprol, and verapamil. With that, the patient's rate came  down from 140s to 160s to in the 90s now. Denied having any chest pain. No known history of coronary artery disease. No fever or chills. No  nausea or vomiting. Currently, the patient is comfortable. No leg  swelling. No shortness of breath.   He just feel generalized body  weakness and feels drained and weak apparently because today because of the diuresis and agree with  starting the Ringer's lactate to give him back a little bit of more  fluid. There is an acute kidney injury and the patient is little  dehydrated and blood pressure in the lower side of normal, that is  unusual for him. We will bolus him with 200 mL of Ringer's lactate and  continue with 50 mL/hour, which has been already started by the  nephrologist.    From a cardiac point of view, we will put a parameter for clonidine. Clonidine to be given only if the systolic more than 896 and the rest  Toprol and verapamil to be given if the systolic remains more than 100. Repeat an EKG and see if there is any improvement in the EKG findings  and we will do echocardiogram and functional study and based on those,  we will gauge further care. The patient may benefit from ischemia workup in view of his significant  abnormality on the EKG with significant ST depression, worse than what  he has at the baseline. Based on the course, we will gauge further care. I discussed with the  patient and the nurse the plan of care. Thank you for allowing me to participate in the care of this patient. We will follow up with you. Esperanza Goodman.  Susan Mcdonald M.D.    D: 07/17/2019 16:35:59       T: 07/17/2019 21:42:43     [email or web address]  Job#: [id number]     Doc#: [id number]    CC:

## 2019-07-18 NOTE — PLAN OF CARE
Problem: Falls - Risk of:  Goal: Will remain free from falls  Description  Will remain free from falls  7/18/2019 0048 by Christiano Galicia RN  Outcome: Ongoing  Note:   Patient up with one assist stand by, tolerates well. Patient uses call light appropriately, bed alarm in use. Problem: Falls - Risk of:  Goal: Absence of physical injury  Description  Absence of physical injury  7/18/2019 0048 by Christiano Galicia RN  Outcome: Ongoing  Note:   No physical injury this shift. Problem: Discharge Planning:  Goal: Discharged to appropriate level of care  Description  Discharged to appropriate level of care  7/18/2019 0048 by Josefina Arcos RN  Outcome: Ongoing  Note:   Patient from home with wife, plan to return back home with wife when medically stable. Problem: Bowel Function - Altered:  Goal: Bowel elimination is within specified parameters  Description  Bowel elimination is within specified parameters  7/18/2019 0048 by Christiano Galicia RN  Outcome: Ongoing  Note:   Patients bowel sounds active x4, stating he is passing gas appropriately. Problem: Cardiac Output - Decreased:  Goal: Hemodynamic stability will improve  Description  Hemodynamic stability will improve  7/18/2019 0048 by Christiano Galicia RN  Outcome: Ongoing  Note:   Patients blood pressures in 130 at beginning of shift, 150s at midnight assessment. Gave hydralazine prn for blood pressure. Heart rate within normal range. Problem: Fluid Volume - Imbalance:  Goal: Absence of imbalanced fluid volume signs and symptoms  Description  Absence of imbalanced fluid volume signs and symptoms  7/18/2019 0048 by Christiano Galicia RN  Outcome: Ongoing  Note:   Patient has no signs of fluid volume imbalance. Lung sounds clear, no edema present. Adequate intake and output ratio. Will continue to monitor.       Problem: Nutrition Deficit:  Goal: Ability to achieve adequate nutritional intake will improve  Description  Ability to achieve adequate nutritional intake will improve  7/18/2019 0048 by Brianda Florse RN  Outcome: Ongoing  Note:   Patient on carb control diet, eating majority of meals with adequate oral fluid intake. Problem: Serum Glucose Level - Abnormal:  Goal: Ability to maintain appropriate glucose levels will improve to within specified parameters  Description  Ability to maintain appropriate glucose levels will improve to within specified parameters  7/18/2019 0048 by Frieda Galicia RN  Outcome: Ongoing  Note:   Blood sugars have been within normal range this shift. Problem: Skin Integrity - Impaired:  Goal: Absence of new skin breakdown  Description  Absence of new skin breakdown  7/18/2019 0048 by Brianda Flores RN  Outcome: Ongoing  Note:   Patient has no new skin breakdown this shift, encouraged to turn frequently to prevent impaired skin integrity. Will continue to monitor skin. Care plan reviewed with patient and wife. Patient and wife verbalize understanding of the plan of care and contribute to goal setting.

## 2019-07-19 PROBLEM — E43 SEVERE MALNUTRITION (HCC): Status: ACTIVE | Noted: 2018-04-27

## 2019-07-19 NOTE — PROGRESS NOTES
Cardiology Progress Note      Patient:  Shea Gonzales  YOB: 1933  MRN: [id number]   Acct: [de-identified]  [facility] Date:  7/12/2019  Primary Cardiologist: Dr. Joel Pozo  Seen by Rosamaria Hall    Per prior cardiology consult note-  REASON FOR CONSULTATION:  Atrial fibrillation with rapid ventricular  response this morning, an 59-year-old gentleman admitted with congestive  heart failure, who was on diuresis with cardiorenal syndrome.     HISTORY OF PRESENT ILLNESS:  This is an 59-year-old gentleman, known to  have chronic kidney disease stage 5, hypertension, and paroxysmal atrial  fibrillation, presented on 07/12/2019 because of generalized body  weakness, leg swelling, and shortness of breath with orthopnea and he  stated he has gained 5 pounds since in the last few weeks prior to his  admission and his blood pressure was poorly controlled and so he was  also having multiple medication for blood pressure control, so in view  of that, the patient came in and admitted with a diagnostic impression  of acute CHF exacerbation with cardiorenal syndrome and has been on  aggressive diuresis with diuretics IV drip and with that, the patient  got better and swelling resolved and today, he went into a atrial  fibrillation with rapid ventricular response and was given his home  medication, Toprol, and verapamil. With that, the patient's rate came  down from 140s to 160s to in the 90s now. Denied having any chest pain. No known history of coronary artery disease. No fever or chills. No  nausea or vomiting. Currently, the patient is comfortable. No leg  swelling. No shortness of breath. He just feel generalized body  weakness and feels drained and weak apparently because of significant  diuresis. After he came, he lost significant weight over 20 pounds per  the patient. Subjective (Events in last 24 hours):   Pt ill appearing and frail. Appears to be in no acute distress. Accompanied by wife and daughter.    Pt. normal in appearance with no evidence of a pericardial   effusion.      Pleural Effusion   Left pleural effusion.      Aorta / Great Vessels   -Aortic root dimension within normal limits.   -The Pulmonary artery is within normal limits.   -IVC size is within normal limits with normal respiratory phasic changes. Cath:  Findings:      50-60% mid RCA lesion, diffusely calcified RCA     100% mid LAD lesion, calcified, Chronic total occlusion() of the LAD with left to left collaterals.     Hunter Trejo MD  Date: 7/19/2019  Time: 2:55 AM    Lab Data:    Cardiac Enzymes:  No results for input(s): CKTOTAL, CKMB, CKMBINDEX, TROPONINI in the last 72 hours.     CBC:   Lab Results   Component Value Date    WBC 7.9 07/13/2019    RBC 2.66 07/13/2019    RBC 4.30 06/06/2012    HGB 8.3 07/13/2019    HCT 26.6 07/13/2019     07/13/2019       CMP:  Lab Results   Component Value Date     07/18/2019    K 3.8 07/18/2019    K 4.4 07/13/2019     07/18/2019    CO2 21 07/18/2019    BUN 71 07/18/2019    CREATININE 5.2 07/18/2019    LABGLOM 11 07/18/2019    GLUCOSE 80 07/18/2019    GLUCOSE 114 06/06/2012    CALCIUM 8.5 07/18/2019       Hepatic Function Panel:  Lab Results   Component Value Date    ALKPHOS 161 07/12/2019    ALT 13 07/12/2019    AST 15 07/12/2019    PROT 6.5 07/12/2019    BILITOT 0.5 07/12/2019    BILIDIR <0.2 07/12/2019    LABALBU 4.2 07/12/2019    LABALBU 4.4 06/06/2012       Magnesium:    Lab Results   Component Value Date    MG 1.9 07/14/2019       PT/INR:    Lab Results   Component Value Date    INR 1.15 04/30/2018       HgBA1c:    Lab Results   Component Value Date    LABA1C 5.1 04/25/2019       FLP:  Lab Results   Component Value Date    TRIG 138 11/06/2017    HDL 44 11/06/2017    LDLCALC 68 11/06/2017    LDLDIRECT 71.13 01/28/2016       TSH:    Lab Results   Component Value Date    TSH 1.450 05/14/2018         Assessment:    [place] w/ EKG changes - OMT    S\p cardiac cath 7/19/19:50-60% mid RCA lesion, diffusely calcified RCA   100% mid LAD lesion, calcified, Chronic total occlusion() of the LAD with left to left collaterals.       Acute on chronic systolic CHF, combined systolic-diastolic CHF exacerbation: EF 55%   -SOB, orthopnea, leg edema: resolved     [place] / CKD stage V: GFR 11      PAfib with rapid RVR : now in NSR    -s\p cardioversion 05/31/2018: successful FRANCO-guided cardioversion of  atrial flutter with RVR   -controlled with home medications (verapamil and metoprolol)    eliquis   started    Anemia of chronic disease   HTN   HLP  DMII      Plan:  continue OMT for [address] to BB / CCB regimen  No ACE / ARB with CKD    continue IV heparin for today - DC tomorrow    Watch creat     follow               Electronically signed by GRACE Vargas CNP on 7/18/2019 at 1:15 PM

## 2019-07-19 NOTE — PRE SEDATION
Sedation Pre-Procedure Note    Patient Name: Nilda Huffman   YOB: 1933  Room/Bed: -28/028-A  Medical Record Number: [id number]  Date: 7/19/2019   Time: 2:04 AM       Indication:      Persistent chest pain  Diffuse ST depressions    Consent: I have discussed with the patient and/or the patient representative the indication, alternatives, and the possible risks and/or complications of the planned procedure and the anesthesia methods. The patient and/or patient representative appear to understand and agree to proceed. Vital Signs:   Vitals:    07/19/19 0116   BP: (!) 214/96   Pulse: 100   Resp: 16   Temp:    SpO2: 98%       Past Medical History:   has a past medical history of Anemia, iron deficiency, Arthritis, Atonic bladder, Atrial fibrillation ([address] BPH (benign prostatic hyperplasia), Bronchitis, CAD (coronary artery disease), CKD (chronic kidney disease) stage 3, GFR 30-59 ml/min ([address] Gallstones, Hard of hearing, Hyperlipidemia, Hypertension, Melanoma ([address] PAD (peripheral artery disease) ([address] Proteinuria, Renal insufficiency, and Type II or unspecified type diabetes mellitus without mention of complication, not stated as uncontrolled. Past Surgical History:   has a past surgical history that includes Skin cancer excision; lymphadenectomy; Skin cancer excision (8/2013); Dental surgery; Mohs surgery (Right, 8/29/13); eyelid closure (Left, 12/19/13); hernia repair (Bilateral, 9/22/14); Eye surgery; ERCP (N/A, 4/11/2018); and ERCP (4/11/2018).     Medications:   Scheduled Meds:    metoprolol  5 mg Intravenous Once    morphine  4 mg Intravenous Once    metoprolol        nitroglycerin  0.5 inch Topical 4 times per day    verapamil  120 mg Oral Daily    metoprolol succinate  50 mg Oral QAM    iron sucrose  300 mg Intravenous Q48H    cloNIDine  0.3 mg Oral TID    clopidogrel  75 mg Oral Daily    apixaban  2.5 mg Oral BID    ferrous sulfate  325 mg Oral Daily with breakfast   

## 2019-07-19 NOTE — PROGRESS NOTES
56: paged rapid nurse Mike Dumont RN, updated patient status of new onset chest pain rated 9/10  /86 . Stat EKG. Mike Dumont RN stated on his way to evaluate patient. 0112: rapid nurse Mike Dumont RN and Rivka RN here to evaluate patient    0123: Rivka RN asked Justice Lara RN to call rapid response for chest pain, shaking, and diaphoresis. /91 - , HR 96, O2 99% 2L NC for comfort. 0124: Dr. Debra Brewer, Dr. Tyrell Moser and lab arrived. 0125Clanjohn Peguero, house supervisor arrived    0126: oral nitro ordered, Stat trop, CBC, BMP, and mag ordered. Respiratory arrived. 0127Madalydiana Wong NP arrived, 2mg IV morphine ordered    0128: 1 sublingual nitro given, 5mg IV metoprolol ordered, Dr. Tyrell Moser, Dr. Debra Brewer and Se Gallego NP reviewing EKG and updating wife Anabella Duvall. 0130Myra Esquivel RN called STEMI alert per Dr. Debra Brewer and Dr. Tyrell Moser. 324mg of aspirin ordered    0131: 2mg IV morphine given    0132: /82 - , , O2 96% 2L NC    0134: /74 - MAP 97    0135: pain 5/10, denies shortness of breath, discussing kidney function with physicians and risk of heart catheterization with a creatinine of 5.2, discussed with patient and wife as well. 324mg aspirin cancelled due to eliquis given at 2100 and plavix given 7/18 at [address] [facility].     0137: Dr. Linette abarca, page referred to Dr. Pichardo Cleaves covering. 6608: half inch nitro paste ordered, 5mg IV metoprolol not given because Dr. Debra Brewer ordered nitro paste in place of.    0140: Dr. Prabhu Georges arrived. Nitro paste applied. 0142: cath lab team arrived     0143: chest pain 5/10    0145: Dr. Marino Howell called back, okay from nephrology standpoint for heart cath with risk of kidney failure. Made sure patient and family was aware of risks but otherwise on board with heart cath. Changed LR at 50 mL to normal saline infusion at 50 mL/hr.    0146: Prabhu Georges speaking with Casey Iniguez, daughter POA. Verbal consent given over the telephone for heart catheterization.  Patient and wife also agreeable to heart catheterization with risk of kidney damage. Daughter is on her way. 0147: Dr. Justin Mendoza updated patient and wife Rosanna Spicer plan to proceed with heart catheterization. Patient and wife Rosanna Spicer agreeable. 0148: normal saline infusion started at 50 mL/hr    0151: /76, HR 95    0155: patient left for cath lab    0245: daughter Mirella Tinsley arrived to 3K30. Ricke Skiff RN and Danae RN updated daughter. 5057: Dr. Taylor Marks arrived to 3K13 to update daughter and wife Rosanna Spicer that patient is going to ICU post catheterization for close blood pressure management. Unable to place stent at this time and feels strongly to manage medically. 0315Isaiah Avila RN called report to ICU DANIEL Neal. 0320Isaiah Avila RN took daughter Mirella Tinsley, wife Rosanna Spicer, and patient belongings to ICU.      Electronically signed by Nydia Romero RN on 7/19/2019 at 4:36 AM

## 2019-07-19 NOTE — PROGRESS NOTES
[facility]      Progress Note  [facility] ICU 4D       Patient: Janak Ge  Unit/Bed: 4D-09/009-A  YOB: 1933  MRN: [id number]  Acct: [de-identified]   Admitting Diagnosis: Acute congestive heart failure, unspecified heart failure type [facility]) [I50.9]  Acute congestive heart failure, unspecified heart failure type [facility]) [I50.9]  Admit Date:  7/12/2019  Hospital Day: 7    Interval History:    Patient is on admission for pulmonary edema and generalized body swelling as a consequence of severe kidney disease and currently being treated with diuretics. The following Services are consulting: Nephrology and Cardiology    Chart, Labs, Radiology studies, and Consults reviewed. Subjective:  Patient seen and examined. Had STEMI code overnight, unsuccessful attempt at PCI. There are no new complaints today.     Objective:   BP (!) 151/75   Pulse 93   Temp 97.6 °F (36.4 °C) (Oral)   Resp 13   Ht 5' 10\" (1.778 m)   Wt 133 lb 6.1 oz (60.5 kg)   SpO2 97%   BMI 19.14 kg/m²       Intake/Output Summary (Last 24 hours) at 7/19/2019 0820  Last data filed at 7/19/2019 0003  Gross per 24 hour   Intake 2120.5 ml   Output 975 ml   Net 1145.5 ml        Physical Examination:   General appearance: alert, appears stated age, cooperative and in no distress  Lungs: rales bibasilar and bilaterally  Heart: regular rate and rhythm, S1, S2 normal, no murmur, click, rub or gallop  Abdomen: normal findings: bowel sounds normal and no masses palpable  Extremities: resolved bilateral leg edema   Pulses: 2+ and symmetric  Skin: Skin color, texture, turgor normal. No rashes or lesions  Neurologic: Grossly normal    [place]:  Drains:  Central Line/port:   EKG:  Imaging:      Scheduled Meds:   morphine  4 mg Intravenous Once    metoprolol        sodium chloride flush  10 mL Intravenous 2 times per day    sodium bicarbonate  1,300 mg Oral TID    verapamil  120 mg Oral Daily    metoprolol succinate  50 mg Oral QAM  iron sucrose  300 mg Intravenous Q48H    cloNIDine  0.3 mg Oral TID    clopidogrel  75 mg Oral Daily    ferrous sulfate  325 mg Oral Daily with breakfast    glimepiride  1 mg Oral QAM AC     Continuous Infusions:   sodium chloride 50 mL/hr at 07/19/19 0148    heparin (porcine) 8 Units/kg/hr (07/19/19 0742)    nitroGLYCERIN 40 mcg/min (07/19/19 0727)    dextrose       PRN Meds:nitroGLYCERIN, sodium chloride flush, acetaminophen, heparin (porcine), heparin (porcine), hydrALAZINE, magnesium hydroxide, ondansetron, glucose, dextrose, glucagon (rDNA), dextrose    Diet:  DIET CARDIAC;    DVT Prophylaxis:    Data:  CBC:   Recent Labs     07/19/19  0143 07/19/19  0531   WBC 10.4 12.7*   RBC 3.42* 3.26*   HGB 10.9* 10.1*   HCT 33.5* 32.5*   MCV 98.0* 99.7*    333     BMP:   Recent Labs     07/18/19  0525 07/19/19 0143 07/19/19  0359    141 140   K 3.8 4.1 3.8    104 103   CO2 21* 17* 17*   BUN 71* 72* 71*   CREATININE 5.2* 4.8* 5.0*     BNP: No results for input(s): BNP in the last 72 hours. PT/INR: No results for input(s): PROTIME, INR in the last 72 hours. APTT:   Recent Labs     07/19/19  0531   APTT 185.0*     CARDIAC ENZYMES:   Recent Labs     07/19/19  0143 07/19/19  0359   TROPONINT 0.047* 0.107*           Principal Problem:    Kidney disease with fluid retention  Active Problems:    Acute on chronic congestive heart failure (HCC)    Acute respiratory failure with hypoxia (HCC)    Acute congestive heart failure (HCC)    GLENYS (acute kidney injury) ([facility])    CKD (chronic kidney disease) stage 4, GFR 15-29 ml/min ([facility])    Metabolic acidosis    Hypervolemia  Resolved Problems:    * No resolved hospital problems. *      Active Issues    Assessment & Plan:     1.  STEMI  - developed chest pain during the night with /91  - urgently take  To Cath Lab based on EKG findings  - cath showed  to LAD with collaterals; 50-60% RCA occlusion with unsuccessful PCI in cath lab  - continue medical

## 2019-07-19 NOTE — POST SEDATION
Sedation Post Procedure Note    Patient Name: Jessica Huizar   YOB: 1933  Room/Bed: [facility]28028-A  Medical Record Number: [id number]  Date: 7/19/2019   Time: 2:54 AM         Physicians/Assistants: Linus Curling, MD    Procedure Performed:      [facility]  Coronary angiography  Attempt at PCI to the LAD    Post-Sedation Vital Signs:  Vitals:    07/19/19 0116   BP: (!) 214/96   Pulse: 100   Resp: 16   Temp:    SpO2: 98%      Vital signs were reviewed and were stable after the procedure (see flow sheet for vitals)           Complications: none    Blood loss: less than 10 cc.     Electronically signed by Linus Curling, MD on 7/19/2019 at 2:54 AM

## 2019-07-20 NOTE — PROGRESS NOTES
Output 1000 ml   Net 1461.75 ml         General appearance: alert, appears stated age, cooperative and in no distress  Lungs: rales bibasilar and bilaterally  Heart:  S1, S2 heard  Abdomen: normal findings: bowel sounds normal and no masses palpable  Extremities: resolved bilateral leg edema   Pulses: 2+ and symmetric  Skin: Skin color, texture, turgor normal. No rashes or lesions  Neurologic: Grossly normal    Review of Labs and Diagnostic Testing:    CBC:   Recent Labs     07/20/19  0349   WBC 9.4   HGB 8.9*   HCT 29.1*   .1*        BMP:   Recent Labs     07/20/19  0349      K 4.0      CO2 19*   BUN 69*   CREATININE 4.7*   CALCIUM 8.3*   GLUCOSE 47*     PT/INR: No results for input(s): PROTIME, INR in the last 72 hours. APTT:   Recent Labs     07/20/19  0805   APTT 97.9*      Lipids: No results for input(s): ALKPHOS, ALT, AST, BILITOT, BILIDIR, LABALBU, AMYLASE, LIPASE in the last 72 hours. Troponin: No results for input(s): TROPONINI in the last 72 hours. Imaging:  Xr Chest Portable    Result Date: 7/17/2019  PROCEDURE: XR CHEST PORTABLE CLINICAL INFORMATION: dyspnea, chf. COMPARISON: July 12, 2019 TECHNIQUE: AP upright view of the chest. FINDINGS: The heart remains within normal limits. There is been interval improved aeration of the lungs with resolution of previous bronchitis and/or mild venous congestion changes that were noted. Post surgical clips along the right axilla are stable. There is minimal atherosclerosis. Calcified left perihilar nodes appear similar. 1. Resolution of previous bilateral interstitial and airspace changes. 2. Negative exam for acute pathology of the chest. **This report has been created using voice recognition software. It may contain minor errors which are inherent in voice recognition technology. ** Final report electronically signed by Dr. Abilio Culver on 7/17/2019 10:33 PM      EKG:      Diet: DIET CARDIAC;   Dietary Nutrition Supplements:

## 2019-07-20 NOTE — PLAN OF CARE
Problem: Falls - Risk of:  Goal: Will remain free from falls  Description  Will remain free from falls  Outcome: Met This Shift  Note:   Continue fall precautions, up with assist, bed alarm. Problem: Falls - Risk of:  Goal: Absence of physical injury  Description  Absence of physical injury  Outcome: Met This Shift     Problem: Life Adjustment  Goal: Verbalization of feelings regarding change/loss  Outcome: Met This Shift     Problem: Support with Decision-Making  Goal: Verbalization of thoughts/feelings regarding decision  Outcome: Met This Shift     Problem: Tissue Perfusion - Peripheral, Altered:  Goal: Absence of hematoma at arterial access site  Description  Absence of hematoma at arterial access site  Outcome: Met This Shift  Note:   Left radial cath site soft, some bruising, no hematoma. Problem: Discharge Planning:  Goal: Discharged to appropriate level of care  Description  Discharged to appropriate level of care  Outcome: Ongoing  Note:   Continue discharge planning. Patient from home with wife. Assess for discharge needs. Problem: Bowel Function - Altered:  Goal: Bowel elimination is within specified parameters  Description  Bowel elimination is within specified parameters  Outcome: Ongoing  Note:   No BM today. Continue to monitor. Problem: Cardiac Output - Decreased:  Goal: Hemodynamic stability will improve  Description  Hemodynamic stability will improve  Outcome: Ongoing  Note:   Episode of chest pain this morning. Nitro drip restarted, up to 60mcg, then weaning slowly today per Stepan Marcos CNP. Continue to monitor BP, VS, telemetry. Problem: Fluid Volume - Imbalance:  Goal: Absence of imbalanced fluid volume signs and symptoms  Description  Absence of imbalanced fluid volume signs and symptoms  Outcome: Ongoing  Note:   Crackles noted to left lung base this morning, diminished by noon assessment.  Continue to assess for fluid overload, assess urine output, and watch

## 2019-07-21 NOTE — PROGRESS NOTES
sulfate  325 mg Oral Daily with breakfast    glimepiride  1 mg Oral QAM AC    sodium bicarbonate  650 mg Oral BID    sodium chloride flush  10 mL Intravenous 2 times per day      lactated ringers 50 mL/hr at 19 0221    dextrose         hydrALAZINE 20 mg Q4H PRN   sodium chloride flush 10 mL PRN   magnesium hydroxide 30 mL Daily PRN   ondansetron 4 mg Q6H PRN   acetaminophen 650 mg Q4H PRN   glucose 15 g PRN   dextrose 12.5 g PRN   glucagon (rDNA) 1 mg PRN   dextrose 100 mL/hr PRN       Diagnostics:  EK2019 07:10:52 [facility] ROUTINE RETRIEVAL  Sinus rhythm with Premature atrial complexes  Possible Left atrial enlargement  Left axis deviation  Inferior infarct (cited on or before 2018)  Abnormal ECG  When compared with ECG of 2019 06:06,  Premature atrial complexes are now Present          FRANCO (18):    Ejection fraction is visually estimated at 45%.   There was mild global hypokinesis of the left ventricle.   Left atrial size was normal with no thrombus identified. APPENDAGE: The   left atrial appendage size was normal with no thrombus identified.   DOPPLER: The function was normal (normal emptying velocity).   Moderate mitral regurgitation is present. Echo:   Electronically signed by Gloria Cabral MD (Interpreting  [phone]) on 2019 at 05:26 PM   ----------------------------------------------------------------      Findings      Mitral Valve   The mitral valve structure was normal with normal leaflet separation.   DOPPLER: The transmitral velocity was within the normal range with no   evidence for mitral stenosis. There was no evidence of mitral   regurgitation.      Aortic Valve   The aortic valve was trileaflet with increased thickness, calcification.   DOPPLER: Transaortic velocity was within the normal range with no evidence   of aortic stenosis.  There was mild aortic regurgitation.      Tricuspid Valve   The tricuspid valve structure was normal with normal leaflet separation.   DOPPLER: There was no evidence of tricuspid stenosis. There was trace   tricuspid regurgitation.      Pulmonic Valve   The pulmonic valve leaflets were not well seen. DOPPLER: The transpulmonic   velocity was within the normal range with no evidence for regurgitation.      Left Atrium   Left atrial size was normal.      Left Ventricle   Normal left ventricular size and systolic function.   There were no regional wall motion abnormalities.   Wall thickness was within normal limits.   Ejection fraction was estimated at 55-60%.  E/A flow reversal noted. Suggestive of diastolic dysfunction.      Right Atrium   Right atrial size was normal.      Right Ventricle   The right ventricular size was normal with normal systolic function and   wall thickness.      Pericardial Effusion   The pericardium was normal in appearance with no evidence of a pericardial   effusion.      Pleural Effusion   No evidence of pleural effusion.      Aorta / Great Vessels   -Ascending aorta = 2.7 cm.   -IVC size is not well seen.         Electronically signed by Jorge Lozada MD (Interpreting   physician) on 04/12/2018 at 05:39 PM   ----------------------------------------------------------------      Mitral Valve   Trace mitral regurgitation is present.      Aortic Valve   The aortic valve leaflets were not well visualized.   Aortic valve appears tricuspid.   Thickened aortic valve leaflets noted.   Aortic valve leaflets are Mildly calcified.      Tricuspid Valve   Tricuspid valve was not well visualized.   Trivial tricuspid regurgitation visualized.      Pulmonic Valve   The pulmonic valve was not well visualized .      Left Atrium   Left atrial size was normal.      Left Ventricle   Ejection fraction is visually estimated at 55%.    Hypokinetic motion of the apical anteroseptal wall noted in the left   ventricle.      Right Atrium   Right atrial size was normal.      Right Ventricle   The right

## 2019-07-21 NOTE — PROGRESS NOTES
Dr. Aren Posada note       Internal Medicine              Patient:  Jayleen Vigil  YOB: 1933    MRN: [id number]   Acct:  [de-identified]   [id number]  Primary Care Physician: Miky Monae MD    Admit Date: 7/12/2019           Subjective: no new issue. Objective:      Physical Exam:    Vitals:  Patient Vitals for the past 24 hrs:   BP Temp Temp src Pulse Resp SpO2   07/21/19 1121 (!) 161/68 97.4 °F (36.3 °C) Oral 65 18 99 %   07/21/19 0611 (!) 159/71 -- -- 77 -- --   07/21/19 0330 (!) 168/76 98.2 °F (36.8 °C) Oral 72 18 95 %   07/20/19 2354 (!) 156/67 97.5 °F (36.4 °C) Oral 64 18 96 %   07/20/19 2115 (!) 170/80 97.6 °F (36.4 °C) Oral 72 20 96 %   07/20/19 1745 136/66 -- -- -- -- --   07/20/19 1634 -- -- -- -- -- 93 %   07/20/19 1628 118/60 -- -- -- -- --   07/20/19 1515 (!) 181/84 97.8 °F (36.6 °C) Oral 77 20 99 %     Weight: Weight: 149 lb 4.8 oz (67.7 kg)     24 hour intake/output:    Intake/Output Summary (Last 24 hours) at 7/21/2019 1346  Last data filed at 7/21/2019 0636  Gross per 24 hour   Intake 1380.04 ml   Output 2825 ml   Net -1444.96 ml         General appearance: alert, appears stated age, cooperative and in no distress  Lungs: rales bibasilar and bilaterally  Heart:  S1, S2 heard  Abdomen: normal findings: bowel sounds normal and no masses palpable  Extremities: resolved bilateral leg edema   Pulses: 2+ and symmetric  Skin: Skin color, texture, turgor normal. No rashes or lesions  Neurologic: Grossly normal    Review of Labs and Diagnostic Testing:    CBC:   Recent Labs     07/20/19  0349 07/21/19  0331   WBC 9.4  --    HGB 8.9*  --    HCT 29.1*  --    .1*  --     307     BMP:   Recent Labs     07/21/19  0331      K 4.0      CO2 22*   BUN 60*   CREATININE 4.7*   CALCIUM 8.6   GLUCOSE 68*     PT/INR: No results for input(s): PROTIME, INR in the last 72 hours.   APTT:   Recent Labs     07/21/19  0009

## 2019-07-21 NOTE — PLAN OF CARE
Abnormal:  Goal: Ability to maintain appropriate glucose levels will improve  Description  Ability to maintain appropriate glucose levels will improve  Outcome: Ongoing     Problem: Serum Glucose Level - Abnormal:  Goal: Ability to maintain appropriate glucose levels will improve  Description  Ability to maintain appropriate glucose levels will improve  Outcome: Ongoing     Problem: Skin Integrity - Impaired:  Goal: Absence of new skin breakdown  Description  Absence of new skin breakdown  Outcome: Ongoing     Problem: Life Adjustment  Goal: Verbalization of feelings regarding change/loss  Outcome: Ongoing     Problem: Life Adjustment  Goal: Verbalization of feelings regarding change/loss  Outcome: Ongoing     Problem: Support with Decision-Making  Goal: Verbalization of thoughts/feelings regarding decision  Outcome: Ongoing     Problem: Nutrition  Goal: Optimal nutrition therapy  Outcome: Ongoing     Problem: Tissue Perfusion - Cardiopulmonary, Altered:  Goal: Hemodynamic stability will improve  Description  Hemodynamic stability will improve  Outcome: Ongoing     Care plan reviewed with patient . Patient  verbalize understanding of the plan of care and contribute to goal setting.

## 2019-07-21 NOTE — PROGRESS NOTES
Recent Labs     07/19/19  0143 07/19/19  0531 07/20/19  0349 07/21/19  0331   WBC 10.4 12.7* 9.4  --    RBC 3.42* 3.26* 2.85*  --    HGB 10.9* 10.1* 8.9*  --    HCT 33.5* 32.5* 29.1*  --     333 277 307     Last 3 CMP  Recent Labs     07/19/19  0359 07/20/19  0349 07/21/19  0331    143 140   K 3.8 4.0 4.0    107 104   CO2 17* 19* 22*   BUN 71* 69* 60*   CREATININE 5.0* 4.7* 4.7*   CALCIUM 8.6 8.3* 8.6             Assessment / Plan   Renal -acute kidney injury thought to be from diuretics overall renal function appears to be stable  -Patient is clinically looking well and renal fx is stable as well.  -ok to resume diuretics  -Closely follow BMP if renal function worsens will need renal placement therapy. So far no need    electrolytes-appear to be within normal limits  Essential hypertension -   Anemia  Diabetes mellitus  Mild acidosis- getting better  D/W patient , meds reviewed    DONOVAN Roe D.  [facility].

## 2019-07-22 PROBLEM — I21.11 ST ELEVATION MYOCARDIAL INFARCTION INVOLVING RIGHT CORONARY ARTERY (HCC): Status: ACTIVE | Noted: 2019-01-01

## 2019-07-22 NOTE — PROGRESS NOTES
Daughter points out new rash after starting medication (? Medication)  Denies SOB, says swelling in legs has resolved. Orthopnea resolved.    No cardiac complaints  BP varied; most recent 138/62  Afib: cardioverted in the past (05/2018)     VSS  Tele: SR      7/19/19  Events reviewed from overnight     Pt without chest pain c/o or SOB     LT radial cath site no ecchymosis or hematoma - Pulses present - neurovascular check WNL    Tele - sr no ectopy VSS      7/20/19  Called for MS chest pain - no radiation- ++ SOB / no nausea and /100    Pt in bed - looks pale and SOB - he states SOB started yesterday   Chest pain started this am after he ate for low BS      7/21/19  Pt eating breakfast   VSS    He diuresed 2 L / 24 hours   He states he is feeling much much improved   No chest pains       7/22/19  Pt in bed - no cardiac c/o  VSS    Discussed cath results with daughter and wife and medical treatments first to consider before high risk PCI if symptomatic   - family had requested Dr. Jolly Samson to review cath films for needed intervention options       Objective:   /62   Pulse 53   Temp 98.1 °F (36.7 °C) (Oral)   Resp 16   Ht 5' 10\" (1.778 m)   Wt 135 lb 11.2 oz (61.6 kg)   SpO2 98%   BMI 19.47 kg/m²        TELEMETRY: NSR     Physical Exam:  General Appearance: alert and oriented to person, place and time, in no acute distress  Cardiovascular: normal rate, regular rhythm, normal S1 and S2, no murmurs, rubs, clicks, or gallops, distal pulses intact,  Pulmonary/Chest: cleat t/o, rales or rhonchi, normal air movement, no respiratory distress  Abdomen: soft, non-tender, non-distended, normal bowel sounds, no masses   Extremities: no cyanosis, clubbing or edema, pulses present    Skin: warm and dry, no rash or erythema  Head: normocephalic and atraumatic  Musculoskeletal: normal range of motion, no joint swelling, deformity or tenderness  Neurological: alert, oriented, normal speech, no focal findings or Valve   The aortic valve was trileaflet with increased thickness, calcification.   DOPPLER: Transaortic velocity was within the normal range with no evidence   of aortic stenosis. There was mild aortic regurgitation.      Tricuspid Valve   The tricuspid valve structure was normal with normal leaflet separation.   DOPPLER: There was no evidence of tricuspid stenosis. There was trace   tricuspid regurgitation.      Pulmonic Valve   The pulmonic valve leaflets were not well seen. DOPPLER: The transpulmonic   velocity was within the normal range with no evidence for regurgitation.      Left Atrium   Left atrial size was normal.      Left Ventricle   Normal left ventricular size and systolic function.   There were no regional wall motion abnormalities.   Wall thickness was within normal limits.   Ejection fraction was estimated at 55-60%.  E/A flow reversal noted.  Suggestive of diastolic dysfunction.      Right Atrium   Right atrial size was normal.      Right Ventricle   The right ventricular size was normal with normal systolic function and   wall thickness.      Pericardial Effusion   The pericardium was normal in appearance with no evidence of a pericardial   effusion.      Pleural Effusion   No evidence of pleural effusion.      Aorta / Great Vessels   -Ascending aorta = 2.7 cm.   -IVC size is not well seen.         Electronically signed by Teresa Love MD (Interpreting   physician) on 04/12/2018 at 05:39 PM   ----------------------------------------------------------------      Mitral Valve   Trace mitral regurgitation is present.      Aortic Valve   The aortic valve leaflets were not well visualized.   Aortic valve appears tricuspid.   Thickened aortic valve leaflets noted.   Aortic valve leaflets are Mildly calcified.      Tricuspid Valve   Tricuspid valve was not well visualized.   Trivial tricuspid regurgitation visualized.      Pulmonic Valve   The pulmonic valve was not well visualized .      Left 1.15 04/30/2018       HgBA1c:    Lab Results   Component Value Date    LABA1C 5.1 04/25/2019       FLP:  Lab Results   Component Value Date    TRIG 138 11/06/2017    HDL 44 11/06/2017    LDLCALC 68 11/06/2017    LDLDIRECT 71.13 01/28/2016       TSH:    Lab Results   Component Value Date    TSH 1.450 05/14/2018         Assessment:  Worsening Acute on chronic systolic CHF, combined systolic-diastolic CHF exacerbation: EF 50% - resolved now    NSTEMI / [place] w/ EKG changes - OMT before high risk intervention    S\p cardiac cath 7/19/19:50-60% mid RCA lesion, diffusely calcified RCA   100% mid LAD lesion, calcified, Chronic total occlusion() of the LAD with left to left collaterals.      [place] / CKD stage V - followed by renal      PAfib with rapid RVR : now in NSR    -s\p cardioversion 05/31/2018: successful FRANCO-guided cardioversion of  atrial flutter with RVR   -controlled with home medications (verapamil and metoprolol)    eliquis   started    Anemia of chronic disease   HTN   HLP  DMII      Plan:  Ok to DC     Follow with cardio 2 weeks  Follow with renal     meds reconciled for cardio       Electronically signed by GRACE Wilkes CNP on 7/18/2019 at 1:15 PM

## 2019-07-22 NOTE — PROGRESS NOTES
CLINICAL PHARMACY: DISCHARGE MED RECONCILIATION/REVIEW    [facility]) Select Patient?: Yes  Total # of Interventions Recommended: 3 -   - Updated Order #: 3   -   Total # Interventions Accepted: 3  Intervention Severity:   - Level 1 Intervention Present?: No   - Level 2 #: 3   - Level 3 #: 0   Time Spent (min): 30    Additional Documentation:  Med rec not completed - check with Ang Holm regarding lipitor dose and verapamil. Check with renal Dr. Betzaida Maldonado regarding sodium bicarbonate dose/sig.

## 2019-07-22 NOTE — PLAN OF CARE
Problem: Falls - Risk of:  Goal: Will remain free from falls  Description  Will remain free from falls  7/22/2019 0446 by Smith Chapman RN  Outcome: Ongoing  Note:   Patient free of falls. Call light within reach. Bed in lowest position. Nonskid socks on. Bed alarm on. Hourly rounding continues. Patient ambulates with 1 assist standby. Problem: Discharge Planning:  Goal: Discharged to appropriate level of care  Description  Discharged to appropriate level of care  7/22/2019 0446 by Smith Chapman RN  Outcome: Ongoing  Note:   Patient plans to be sent home with wife to private residence upon discharge. Problem: Cardiac Output - Decreased:  Goal: Hemodynamic stability will improve  Description  Hemodynamic stability will improve  7/22/2019 0446 by Smith Chapman RN  Outcome: Ongoing  Note:   Pt's HR show NSR/SB on telemetry monitor. Denies chest pain t/o shift. Problem: Fluid Volume - Imbalance:  Goal: Absence of imbalanced fluid volume signs and symptoms  Description  Absence of imbalanced fluid volume signs and symptoms  7/22/2019 0446 by Smith Chapman RN  Outcome: Ongoing  Note:   Swelling to BLE much improved. Good urine output. Problem: Serum Glucose Level - Abnormal:  Goal: Ability to maintain appropriate glucose levels will improve to within specified parameters  Description  Ability to maintain appropriate glucose levels will improve to within specified parameters  7/22/2019 0446 by Smith Chapman RN  Outcome: Ongoing  Note:   Monitoring fingerstick glucose AC/HS/PRN. Insulin given as ordered. Problem: Skin Integrity - Impaired:  Goal: Absence of new skin breakdown  Description  Absence of new skin breakdown  7/22/2019 0446 by Smith Chapman RN  Outcome: Ongoing  Note:   Ongoing assessment & interventions provided throughout shift. Skin assessments provided. Encouraging/assisting patient to turn as needed.      Problem: Tissue Perfusion - Cardiopulmonary,

## 2019-07-22 NOTE — PROGRESS NOTES
IV and cardiac monitor removed from patient. Patient assessed and denies needs. Patient with all belongings and new medications from pharmacy outpatient. Cardiac cath site remains free from hematoma, bruising, or drainage. Discharge instructions given and explained to patient via teach back method and he denies questions. Pharmacist reviewed medications with patient and family.   Patient brought down to discharge lobby and daughter to bring him home

## 2019-08-01 NOTE — PROGRESS NOTES
Visit Date: 8/1/2019     Kulwant Chahal is a 80 y.o. male who presents today for:  Chief Complaint   Patient presents with    Diabetes    Hypertension         HPI:       Admitted due to fluid overload. Had Cath but no stent. Feel weak and fatigue     No shortness of breath or chest pain at this time    Diabetes   Pertinent negatives for hypoglycemia include no dizziness or headaches. Pertinent negatives for diabetes include no chest pain, no fatigue and no weakness. Hypertension   Pertinent negatives include no chest pain, headaches, neck pain or palpitations. Current Medications:  Current Outpatient Medications   Medication Sig Dispense Refill    apixaban (ELIQUIS) 2.5 MG TABS tablet Take 1 tablet by mouth 2 times daily 60 tablet 5    glimepiride (AMARYL) 1 MG tablet Take 0.5 tablets by mouth every morning (before breakfast) 90 tablet 1    sodium bicarbonate 650 MG tablet Take 2 tablets by mouth 3 times daily 180 tablet 0    atorvastatin (LIPITOR) 80 MG tablet Take 1 tablet by mouth nightly 30 tablet 3    hydrALAZINE (APRESOLINE) 25 MG tablet Take 1 tablet by mouth every 8 hours 90 tablet 3    furosemide (LASIX) 40 MG tablet Take 1 tablet by mouth daily 60 tablet 3    nitroGLYCERIN (NITROSTAT) 0.4 MG SL tablet up to max of 3 total doses.  If no relief after 1 dose, call 911. 25 tablet 3    isosorbide mononitrate (IMDUR) 30 MG extended release tablet Take 1 tablet by mouth daily 30 tablet 3    aspirin 81 MG EC tablet Take 1 tablet by mouth daily 30 tablet 3    cloNIDine (CATAPRES) 0.3 MG tablet TAKE 1 TABLET BY MOUTH THREE TIMES DAILY 270 tablet 2    clopidogrel (PLAVIX) 75 MG tablet Take 1 tablet by mouth daily 90 tablet 1    blood glucose test strips (ONETOUCH VERIO) strip Check blood sugar once daily Dx: 250.00 100 each 3    metoprolol succinate (TOPROL XL) 100 MG extended release tablet Take 1 tablet by mouth every morning 30 tablet 3    ferrous sulfate 325 (65 Fe) MG tablet Take 325 range    Continue to monitor blood sugars 1times a day. Return in about 14 weeks (around 11/7/2019). Discussed use, benefit, and side effectsof prescribed medications. All patient questions answered. Pt voiced understanding. Instructed to continue current medications, diet and exercise. Patient agreedwith treatment plan.

## 2019-08-01 NOTE — PROGRESS NOTES
Sexual Activity    Alcohol use: No     Alcohol/week: 0.0 standard drinks    Drug use: No    Sexual activity: Never   Lifestyle    Physical activity:     Days per week: None     Minutes per session: None    Stress: None   Relationships    Social connections:     Talks on phone: None     Gets together: None     Attends Shinto service: None     Active member of club or organization: None     Attends meetings of clubs or organizations: None     Relationship status: None    Intimate partner violence:     Fear of current or ex partner: None     Emotionally abused: None     Physically abused: None     Forced sexual activity: None   Other Topics Concern    None   Social History Narrative    None       Family History   Problem Relation Age of Onset    Stroke Mother     Heart Disease Father        Blood pressure (!) 198/73, pulse 52, height 5' 9\" (1.753 m), weight 147 lb (66.7 kg). General:   Well developed, well nourished  Lungs:   Clear to auscultation  Heart:    Normal S1 S2, No murmur, rubs, or gallops  Abdomen:   Soft, non tender, no organomegalies, positive bowel sounds  Extremities:   No edema, no cyanosis, good peripheral pulses  Neurological:   Awake, alert, oriented. No obvious focal deficits  Musculoskeletal:  No obvious deformities    EKG:     ***     Diagnosis Orders   1. Hospital discharge follow-up     2. Non-ST elevation MI (NSTEMI) ([address]     3. Acute on chronic combined systolic and diastolic congestive heart failure ([address]     4. Atrial fibrillation with RVR ([address]     5. Essential hypertension     6. Familial hypercholesterolemia     7. Stage 5 chronic kidney disease not on chronic dialysis ([address]         No orders of the defined types were placed in this encounter.     Continue  ***'s current treatment plan  Follow up with Dr Birt Dakins as scheduled or sooner if needed

## 2019-08-02 PROBLEM — N18.5 STAGE 5 CHRONIC KIDNEY DISEASE NOT ON CHRONIC DIALYSIS (HCC): Status: ACTIVE | Noted: 2019-01-01

## 2019-08-07 NOTE — FLOWSHEET NOTE
Patient : Kimmy Saul Age: 64year old Sex: female   MRN: [id number] Encounter Date: 8/7/2019    E15/15    History     Chief Complaint   Patient presents with   â¢ Blurred Vision   â¢ Tingling     HPI  8/7/2019  6:11 PM Kimmy Saul is a 64year old female who presents to the ED accompanied by family for evaluation of left eye blurriness that began around 2 PM today. The pt reports a h/o cataract in her right eye with a surgery performed by Dr. Candace Hairston. The pt also reports numbness and paresthesia in the left hand. She denies aphasia, HA, dizziness, nausea, vomiting, or any other sx. The pt also reports that she is on Plavix and Aspirin. There are no further complaints or modifying factors at this time. PCP: Evaristo Almonte MD    Allergies   Allergen Reactions   â¢ Ibuprofen DIZZINESS   â¢ Oxaprozin RASH   â¢ Latex Other (See Comments)     rash   â¢ Quinapril Cough and Other (See Comments)     Pt could not swollw and felt like her throat was swollen    â¢ Simvastatin MYALGIA   â¢ [facility]   (Environmental)] ERYTHEMA     Bandaids       Current Discharge Medication List      Prior to Admission Medications    Details   insulin regular 70-30 (NOVOLIN 70/30) (70-30) 100 UNIT/ML injectable suspension 15 U with breakfast; 12 U with dinner Relion brand  Qty: 10 mL, Refills: 6      traMADol (ULTRAM) 50 MG tablet Take 1 tablet by mouth every 6 hours as needed for Pain. Qty: 90 tablet, Refills: 0      atorvastatin (LIPITOR) 80 MG tablet Take 1 tablet by mouth daily. This is an increased dose  Qty: 90 tablet, Refills: 1      verapamil (CALAN SR, ISPOTIN SR) 240 MG CR tablet Take 1 tablet by mouth daily. Qty: 30 tablet, Refills: 6      potassium CHLORIDE (KLOR-CON M) 20 MEQ david ER tablet Take 1 tablet by mouth daily. Qty: 30 tablet, Refills: 0      furosemide (LASIX) 40 MG tablet Take 1 tablet by mouth daily.   Qty: 90 tablet, Refills: 2      lidocaine (XYLOCAINE) 5 % ointment Apply topically as needed Pt is in bed today stating he is feeling bad all over. His wife is in the room with him. Pt and his wife stated that their daughter said that pt already has a Living will. The POA in this pt's records are for financial needs. I left our AD documents with pt and asked them to have the daughter call me when she arrives. We may need pt's records from home.   We had prayer for his relief of pain and God's healing.       07/17/19 1542   Encounter Summary   Services provided to: Patient and family together   Referral/Consult From: [facility] Visiting Yes  (7/17)   Complexity of Encounter Low   Length of Encounter 15 minutes   Routine   Type Follow up   Advance Directives (For Healthcare)   Healthcare Directive No, patient does not have an advance directive for healthcare treatment   Information on Healthcare Directives Requested No "for Pain. Qty: 35.44 g, Refills: 2      losartan (COZAAR) 100 MG tablet Take 1 tablet by mouth daily. Qty: 90 tablet, Refills: 1      clopidogrel (PLAVIX) 75 MG tablet Take 1 tablet by mouth daily. Qty: 90 tablet, Refills: 3      gabapentin (NEURONTIN) 100 MG capsule TAKE 2 CAPSULES BY MOUTH AT BEDTIME  Qty: 180 capsule, Refills: 0      aspirin 81 MG chewable tablet Chew 1 tablet by mouth daily. Qty: 30 tablet, Refills: 11      Insulin Syringe-Needle U-100 31G X 15/64"" 0.3 ML Misc To inject insulin twice daily  Qty: 100 each, Refills: 6      Continuous Blood Gluc Sensor (FREESTYLE KAREN 14 DAY SENSOR) Misc 1 Units every 14 days. Qty: 2 each, Refills: 11      albuterol 108 (90 Base) MCG/ACT inhaler Inhale 2 puffs into the lungs every 4 hours as needed for Shortness of Breath or Wheezing. Qty: 1 Inhaler, Refills: 0      metFORMIN (GLUCOPHAGE) 500 MG tablet TAKE 1 TABLET BY MOUTH TWICE DAILY WITH MEALS  Qty: 180 tablet, Refills: 2      ONETOUCH VERIO test strip Test blood sugar 3 times daily as directed. Diagnosis: E11.65. Meter: One Touch Verio. See note. Qty: 300 strip, Refills: 3      cyclobenzaprine (FLEXERIL) 5 MG tablet Take 1 tablet by mouth 3 times daily as needed for Muscle spasms. Qty: 30 tablet, Refills: 1      DISPENSE Ketoprofen 15% + Clcolobenzaprine 1%+ Lidocaine 5% gel- Apply 1 ml to affected area  3 to 4 times a day. Qty: 90 g, Refills: 5      meclizine (ANTIVERT) 12.5 MG tablet PLEASE TAKE 1-2 TABLETS THREE TIMES DAILY AS NEEDED FOR DIZZINESS. Qty: 30 tablet, Refills: 1      DISPENSE Back support brace diagnosis:  Chronic low back pain (722.6)  Qty: 1 each, Refills: 0      THIOCTIC ACID 300 MG Cap Take 3 capsules by mouth. Alpha Lipoic Acid - may improve blood sugars      Multiple Vitamins-Minerals (MULTIVITAMIN PO) Take 1 tablet by mouth daily.               Past Medical History:   Diagnosis Date   â¢ Chronic pain     C-Spine, T-Spine DDD   â¢ Degenerative disc disease 4/18/2013    back , " knees, neck   â¢ Diabetes mellitus (CMS/[facility])     type 1 - checks blood sugars regularly   â¢ High cholesterol    â¢ Hypertension 2013   â¢ Left knee pain 2014   â¢ Low back pain     injury--fell in hole, disc issues   â¢ Macular edema due to secondary diabetes (CMS/[facility]) 12/10/2013   â¢ Noninfectious ileitis    â¢ Polyneuropathy due to secondary diabetes (CMS/[facility])    â¢ Posterior subcapsular cataract 12/10/2013   â¢ Preproliferative diabetic retinopathy (CMS/[facility]) 12/10/2013   â¢ Thalassemia minor 2017       Past Surgical History:   Procedure Laterality Date   â¢ ARTHROSCOPY KNEE MEDIAL OR LAT      left   â¢ ARTHROSCOPY KNEE MEDIAL OR LAT  6/10/2014    Left knee scope; debr of MFC chondral defect; PMM   â¢ CARDIAC CATHERIZATION      states was negative   â¢ CARDIAC CATHETERIZATION/POSSIBLE PTCA/POSSIBLE STENT  2018   â¢ CARPAL TUNNEL RELEASE Left 2017    carpal tunnel release, trigger finger repair ring and long fingers   â¢  SECTION, CLASSIC  x3   â¢ COLONOSCOPY  10/26/2016    TA, Dr. Neil Martinez, follow up 3 years   â¢ EYE SURGERY  10/15/2014    S/P Avastin right - Braza (injections)   â¢ HERNIA REPAIR      femoral and inguinal right   â¢ REMV CATARACT EXTRACAP INSERT LENS  2014    Cataract Removal Lens Implant OD   â¢ ROTATOR CUFF REPAIR      left   â¢ US GUIDE THYROID FNA ASPIRATION Left 2017    (colloid nodule)   â¢ VAGINAL DELIVERY  x3   â¢ WISDOM TOOTH EXTRACTION         Family History   Problem Relation Age of Onset   â¢ Diabetes Mother         type 1,  at 64 of stroke   â¢ Neurological Disorder Father         fluid on brain, drained and had stroke   â¢ Heart disease Sister    â¢ * Sister         degenerative spine disease/htn   â¢ Diabetes Sister         type 1       Social History     Tobacco Use   â¢ Smoking status: Never Smoker   â¢ Smokeless tobacco: Never Used   Substance Use Topics   â¢ Alcohol use: No   â¢ Drug use: No       Review of Systems   Constitutional: Negative "for chills, fatigue and fever. HENT: Negative for congestion and sore throat. Eyes: Positive for visual disturbance (left eye blurriness). Negative for redness. Respiratory: Negative for cough and shortness of breath. Cardiovascular: Negative for chest pain. Gastrointestinal: Negative for abdominal pain, diarrhea, nausea and vomiting. Genitourinary: Negative for decreased urine volume, difficulty urinating, dysuria, flank pain, pelvic pain and urgency. Musculoskeletal: Negative for arthralgias. Skin: Negative for rash. Neurological: Positive for numbness (with paresthia in left hand). Negative for dizziness, weakness and headaches. Denies aphasia. Hematological: Does not bruise/bleed easily. Psychiatric/Behavioral: The patient is not nervous/anxious. Physical Exam     ED Triage Vitals [08/07/19 4396]   ED Triage Vitals Group      Temp 98.6 Â°F (37 Â°C)      Pulse 80      Resp 16      BP (!) 226/86      SpO2 100 %      EtCO2 mmHg       Height 5' 3"" (1.6 m)      Weight 179 lb 14.3 oz (81.6 kg)      Weight Scale Used ED Actual       Physical Exam   Constitutional: She is oriented to person, place, and time. She appears well-developed and well-nourished. HENT:   Head: Normocephalic. Eyes: Pupils are equal, round, and reactive to light. Fundoscopic exam:       The left eye shows no hemorrhage. Fundus in right eye is normal.  Hard to see fundus in right eye due to cataract . Neck: Normal range of motion. Cardiovascular: Normal rate, regular rhythm, normal heart sounds and intact distal pulses. Pulses:       Dorsalis pedis pulses are 2+ on the right side, and 2+ on the left side. Posterior tibial pulses are 2+ on the right side, and 2+ on the left side. Pulmonary/Chest: Effort normal.   Abdominal: Soft. She exhibits no distension and no mass. There is no tenderness. There is no rebound and no guarding. Musculoskeletal: Normal range of motion.    Neurological: She " is alert and oriented to person, place, and time. She has normal strength. No cranial nerve deficit or sensory deficit. GCS eye subscore is 4. GCS verbal subscore is 5. GCS motor subscore is 6. No finger to nose dysmetria. No pronator drift. Normal sensation throughout all extremities. Normal strength throughout all extremities. No aphasia, no dysarthria. Visual fields intact to confrontation. Skin: Skin is warm. No rash noted. No erythema. Psychiatric: She has a normal mood and affect. Nursing note and vitals reviewed.       ED Course     Procedures    Lab Results     Results for orders placed or performed during the hospital encounter of 08/07/19   CBC & Auto Differential   Result Value Ref Range    WBC 9.5 4.2 - 11.0 K/mcL    RBC 4.70 4.00 - 5.20 mil/mcL    HGB 10.2 (L) 12.0 - 15.5 g/dL    HCT 34.0 (L) 36.0 - 46.5 %    MCV 72.3 (L) 78.0 - 100.0 fl    MCH 21.7 (L) 26.0 - 34.0 pg    MCHC 30.0 (L) 32.0 - 36.5 g/dL    RDW-CV 16.9 (H) 11.0 - 15.0 %     140 - 450 K/mcL    NRBC 0 0 /100 WBC    DIFF TYPE AUTOMATED DIFFERENTIAL     Neutrophil 37 %    LYMPH 53 %    MONO 8 %    EOSIN 2 %    BASO 0 %    Percent Immature Granuloctyes 0 %    Absolute Neutrophil 3.5 1.8 - 7.7 K/mcL    Absolute Lymph 5.0 (H) 1.0 - 4.0 K/mcL    Absolute Mono 0.8 0.3 - 0.9 K/mcL    Absolute Eos 0.1 0.1 - 0.5 K/mcL    Absolute Baso 0.0 0.0 - 0.3 K/mcL    Absolute Immature Granulocytes 0.0 0 - 0.2 K/mcl   Prothrombin Time   Result Value Ref Range    PROTIME 10.4 9.7 - 11.8 sec    INR 1.0    Partial Thromboplastin Time   Result Value Ref Range    PTT 29 22 - 32 sec   Comprehensive Metabolic Panel   Result Value Ref Range    Sodium 143 135 - 145 mmol/L    Potassium 3.5 3.4 - 5.1 mmol/L    Chloride 109 (H) 98 - 107 mmol/L    Carbon Dioxide 27 21 - 32 mmol/L    Anion Gap 10 10 - 20 mmol/L    Glucose 166 (H) 65 - 99 mg/dL    BUN 22 (H) 6 - 20 mg/dL    Creatinine 1.18 (H) 0.51 - 0.95 mg/dL    GFR Estimate, African American 58 GFR Estimate, Non African American 50     BUN/Creatinine Ratio 19 7 - 25    CALCIUM 9.3 8.4 - 10.2 mg/dL    TOTAL BILIRUBIN 0.2 0.2 - 1.0 mg/dL    AST/SGOT 19 <38 Units/L    ALT/SGPT 28 <64 Units/L    ALK PHOSPHATASE 76 45 - 117 Units/L    TOTAL PROTEIN 7.8 6.4 - 8.2 g/dL    Albumin 3.8 3.6 - 5.1 g/dL    GLOBULIN 4.0 2.0 - 4.0 g/dL    A/G Ratio, Serum 1.0 1.0 - 2.4   Magnesium Level   Result Value Ref Range    MAGNESIUM 2.2 1.7 - 2.4 mg/dL   Troponin I Ultra Sensitive   Result Value Ref Range    TROPONIN I <0.02 <0.05 ng/mL   Troponin I - Point of Care   Result Value Ref Range    Troponin I POC <0.10 <0.10 ng/mL   Metered blood glucose   Result Value Ref Range    Glucose Bedside  (H) 65 - 99 mg/dL       EKG Results     EKG Interpretation  Rate: 71  Rhythm: normal sinus rhythm   Abnormality: No ST segment changes. EKG interpreted by ED physician    Radiology Results     Imaging Results          CT Angio Head and Neck Level 1 (Final result)  Result time 08/07/19 19:29:26    Final result                 Impression:    IMPRESSION:    CTA neck:    1. No hemodynamically significant extracranial vascular stenosis,  occlusion, or dissection  2. No other significant findings. CTA head:      1. No hemodynamically significant intracranial stenosis, aneurysm, or  vascular malformation. 2.  No acute intracranial pathology.       //Location Code: [facility]               Narrative:    EXAM:  CT ANGIOGRAM HEAD AND NECK W CONTRAST LEVEL 1    CLINICAL INDICATION: 64years-old Female, presenting history of Focal neuro  deficit, < 6 hrs, stroke suspected. COMPARISON:  August 7, 2019    TECHNIQUE:  Using a multidetector, multislice helical scanner,     CTA of  the intracranial and extracranial circulation after administration of 75 cc  of Isovue-370 intravenously. Subsequently, venous phase CT head was  performed with standard technique. MIP and 3D reconstructions are rendered  and archived to PACS. Determination of the degree of stenosis in the internal carotid arteries is  obtained using measurements of distal internal carotid diameter as the  denominator for stenosis measurement. The method utilized is similar to  that utilized in the WASID (Warfarin vs. Aspirin Symptomatic Intracranial  Disease) trial.  If the degree of stenosis is greater than 30%, the actual  percentage stenosis is given in the body of the report. FINDINGS:    CT HEAD WITH CONTRAST:  No pathologic intracranial enhancement. CTA NECK:    Arch: The aortic arch is patent without dissection or aneurysm. Great Vessels: The great vessels demonstrate standard anatomic branching  pattern. Arch origins of the great vessels are widely patent. The  innominate and subclavian arteries are patent. Right Common Carotid:  Patent. Right Internal Carotid:  Patent. Right External Carotid:  Patent. Left Common Carotid:  Patent. Left Internal Carotid:  Mild atheromatous plaque formation at the carotid  bulb without significant stenosis (less than 30% narrowing by NASCET  criteria). Left External Carotid: Patent. Right Vertebral (V1-V3): Codominant vessel. Patent origin and cervical  segment. Left Verterbral (V1-V3):  Codominant vessel. Patent origin and cervical  segment. Lung apices:  Chronic scarring at the lung apices. Osseous Structures: Mild multilevel degenerative disk and/or facet disease  without evidence of fracture or severe stenosis. Additional Significant Findings:  None    CTA HEAD:    ANTERIOR CIRCULATION:   Right ICA:  Moderate diffuse atherosclerotic calcification throughout the  cavernous segment yielding stenosis of less than 50% by NASCET criteria. Right MCA: Patent. Right JAI: Patent      Left ICA:   Mild atherosclerotic calcification involving the cavernous  segment without significant stenosis. Left MCA: Patent.     Left JAI: Patent      POSTERIOR CIRCULATION: Right Vertebral (V4):  Patent    Left Vertebral (V4):  Patent    Basilar artery: Patent    Right PCA: Patent    Left PCA: Patent   PICA/AICA/SCA:  Dominant anterior inferior cerebellar arteries with  relative dimunitive caliber of the posterior inferior cerebellar arteries. COLLATERAL CIRCULATION:  Anterior communicator:  Patent  Right Posterior communicator: Patent. Left Posterior communicator: Patent. The major dural venous sinuses appear appropriately opacified. XR Chest AP or PA (Final result)  Result time 08/07/19 18:52:04    Final result                 Impression:    IMPRESSION:  No acute airspace disease. Narrative:    EXAM: XR CHEST AP OR PA, 8/7/2019 6:51 PM.    HISTORY: chest pain    COMPARISON: February 22, 2019    FINDINGS:  Cardiac silhouette is normal in size. Pulmonary vasculature is within  normal limits. Lungs and pleural spaces are clear. CT Head Level 1 (Final result)  Result time 08/07/19 18:25:26    Final result                 Impression:    IMPRESSION: Negative noncontrast CT of the head. The results of this exam were called to [address] [place] [address] on 8/7/2019 4:21  PM (PST). Narrative:    PROCEDURE: HEAD CT WITHOUT CONTRAST    COMPARISONS: 01/25/2018    CLINICAL INDICATION: Blurred vision, left eye    TECHNIQUE: Noncontrast head CT was performed with contiguous axial slices. Brain and bone algorithms reconstructed. FINDINGS:   No midline shift. Ventricles, cisterns, and sulci are normal.  No abnormal extraaxial fluid collections. The brain parenchyma is normal.       The sinuses are clear. The orbits are normal.  The cranium is intact.                                      ED Medication Orders (From admission, onward)    Start Ordered     Status Ordering Provider    08/07/19 2100 08/07/19 1810  sodium chloride (PF) 0.9 % injection 2 mL  (Capped IV)  2 times per day      Last [facility] "action:  Given Steve Irizarry    08/07/19 1855 08/07/19 1855  labetalol (NORMODYNE) injection 20 mg  EVERY 30 MIN PRN      Last MAR action:  Given TOMASA BALLESTEROS BEAN    08/07/19 1810 08/07/19 1810  sodium chloride (PF) 0.9 % injection 2 mL  (Capped IV)  PRN      Acknowledged TOMASA BALLESTEROS               [facility]  Vitals  Vitals:    08/07/19 1826 08/07/19 1852 08/07/19 1917 08/07/19 1934   BP: (!) 226/86 (!) 207/83 (!) 181/126 149/67   Pulse: 80 75 73 69   Resp: 16      Temp: 98.6 Â°F (37 Â°C)      TempSrc: Oral      SpO2: 100% 100% 100% 100%   Weight: 81.6 kg      Height: 5' 3"" (1.6 m)          ED Course  Initial Plan:     6:28 PM I rechecked the patient, who is resting comfortably in her bed. We discussed the results of CT, which indicated no acute abnormalities. We discussed the plan for consult with neurology. 6:42 PM I spoke with Dr. George Moss, neurology, regarding the patient's presentation of left eye visual disturbance. We further discussed the results of the visual acuity test which indicated some cataract. We further discussed her left hand numbness and paresthesia. He advised CT angiogram. He further advised to aim for a target BP between 160-180.    6:51 PM I rechecked the patient, who is resting in her bed. We discussed my conversation with Dr. George Moss who advised a CT angiogram. We discussed her current HTN which could be due to stress of the ED. We discussed the plan of care for observation due to possible indication of stroke. The patient agrees with the plan of care. No further questions were asked at this time. 7:10 PM Per RN, the pt's troponin returned normal.    7:22 PM Per RN, the pt's blood pressure is 181/162.     7:49 PM I spoke with Dr. Edith Weber, hospitalist, regarding the patient's presentation in the ED. We discussed the pt's ED workup and current HTN. We further discussed that while her sx's could be ophthalmological, there is still a possibility of stroke.  We then collaboratively " agreed upon further plan of care for observation. 7:55 PM I spoke with the pt to inform her of her normal CT results. We discussed that her BP has improved since ED arrival. The pt states that her vision is not yet improved. She agrees with the plan of care for observation. [facility]  Critical Care time spent on this patient outside of billable procedures:  35 minutes    Does this patient meet Severe Sepsis criteria by CMS SEP-1 definition? No     Does this patient meet Septic Shock criteria by CMS SEP-1 definition? No      Clinical Impression:  ED Diagnoses        Final diagnoses    Blurred vision          Numbness and tingling in left hand          Hypertensive urgency                    Pt to be admitted to Dr. Hunter Baltazar in fair condition.        I have reviewed the information recorded by the scribe for accuracy and agree with its contents.    ____________________________________________________________________    Tally Kanner acting as a scribe for Dr. Chaka Green  Dictation # [id number]  Scribe: Cecilia Sandhu MD  08/08/19 9978

## 2019-08-13 NOTE — PROGRESS NOTES
Janak Ge is a 80 y. o.oldmale came for follow up regarding his chronic kidney disease, Stage IV of several year duration. Yeison Choi's most recent creatinine is 4.3 just above his previous baseline of 3.8-4.0. He developed Acute Kidney Injury while in the hospital 2nd to diuretic therapy. He subsequently had a cardiac cath 7/19/19 without intervention. His creatinine was 4.9 at discharge on 7/22/19. States doing well. State compliance with meds and denies adverse effects. The pt does not check home BP. Noted that BP was high at Cardiology office and hydralazine was increased. States that he had a bad night due to chest pain with fast heart rate over 120 bpm. BP was 220/ so he took extra clonidine along with 2 NTG. BP down to 121/66 at this visit. Heart rate 70s. Denies new issue with diabetes. Recent A1c 4.8. Denies dysuria. denies edema. The pt denies use of NSAIDs. Has a good appetite and gaining wt. Recent Wts and BP Noted and Reviewed  Wt Readings from Last 3 Encounters:   08/13/19 148 lb 9.6 oz (67.4 kg)   08/01/19 147 lb (66.7 kg)   08/01/19 147 lb 9.6 oz (67 kg)     BP Readings from Last 3 Encounters:   08/13/19 121/66   08/01/19 (!) 198/73   08/01/19 138/66     Body mass index is 21.94 kg/m².       PAST MEDICAL HISTORY:       Diagnosis Date    Anemia, iron deficiency 10/2016    Arthritis     Atonic bladder     Atrial fibrillation (HCC)     BPH (benign prostatic hyperplasia)     Bronchitis     CAD (coronary artery disease)     CKD (chronic kidney disease) stage 3, GFR 30-59 ml/min (HCC)     Gallstones 04/09/2018    Hard of hearing     Hyperlipidemia 2000    Hypertension 1990s    Melanoma ([address] 6/10    PAD (peripheral artery disease) [address] [address] 10/14    Proteinuria     Renal insufficiency 1998    Type II or unspecified type diabetes mellitus without mention of complication, not stated as uncontrolled 12/00     SURGICAL HISTORY:    Past Surgical History:   Procedure Laterality

## 2019-08-14 NOTE — PROGRESS NOTES
Pt is here to see bonita per his request  He was at the nephrologist yesterday   He recently had an MI THEY DID DO A HEART CATH BUT NO STENTS   PT WAS HAVING CP YESTERDAY AND HE TOOK NITRO  DENIES ANY CP TODAY  DENIES ANY SOB, LT HEADED OR DIZZINESS   DENIES ANY CHEST TIGHTNESS

## 2019-08-23 NOTE — DISCHARGE SUMMARY
UA NONE FEW/NONE S    Casts NONE SEEN NONE SEEN /lpf    Crystals NONE SEEN NONE SEEN    Renal Epithelial, Urine NONE SEEN NONE SEEN    Yeast, UA NONE SEEN NONE SEEN    Casts NONE SEEN /lpf    Miscellaneous Lab Test Result NONE SEEN    Magnesium   Result Value Ref Range    Magnesium 2.2 1.6 - 2.4 mg/dL   Anion Gap   Result Value Ref Range    Anion Gap 17.0 (H) 8.0 - 16.0 meq/L   Glomerular Filtration Rate, Estimated   Result Value Ref Range    Est, Glom Filt Rate 14 (A) ml/min/1.73m2   Osmolality   Result Value Ref Range    Osmolality Calc 300.3 (H) 275.0 - 300 mOsmol/kg   Blood gas, arterial   Result Value Ref Range    pH, Blood Gas 7.39 7.35 - 7.45    PCO2 32 (L) 35 - 45 mmhg    PO2 83 71 - 104 mmhg    HCO3 19 (L) 23 - 28 mmol/l    Base Excess (Calculated) -4.9 (L) -2.5 - 2.5 mmol/l    O2 Sat 96 %    IFIO2 100     DEVICE NRB     COLLECTED BY: [id number]    Basic Metabolic Panel w/ Reflex to MG   Result Value Ref Range    Sodium 145 135 - 145 meq/L    Potassium reflex Magnesium 4.4 3.5 - 5.2 meq/L    Chloride 109 98 - 111 meq/L    CO2 21 (L) 23 - 33 meq/L    Glucose 98 70 - 108 mg/dL    BUN 50 (H) 7 - 22 mg/dL    CREATININE 3.8 (HH) 0.4 - 1.2 mg/dL    Calcium 9.0 8.5 - 10.5 mg/dL   CBC auto differential   Result Value Ref Range    WBC 7.9 4.8 - 10.8 thou/mm3    RBC 2.66 (L) 4.70 - 6.10 mill/mm3    Hemoglobin 8.3 (L) 14.0 - 18.0 gm/dl    Hematocrit 26.6 (L) 42.0 - 52.0 %    .0 (H) 80.0 - 94.0 fL    MCH 31.2 26.0 - 33.0 pg    MCHC 31.2 (L) 32.2 - 35.5 gm/dl    RDW-CV 14.0 11.5 - 14.5 %    RDW-SD 50.8 (H) 35.0 - 45.0 fL    Platelets 618 269 - 942 thou/mm3    MPV 9.7 9.4 - 12.4 fL    Seg Neutrophils 77.5 %    Lymphocytes 9.7 %    Monocytes 8.9 %    Eosinophils 3.0 %    Basophils 0.6 %    Immature Granulocytes 0.3 %    Segs Absolute 6.1 1.8 - 7.7 thou/mm3    Lymphocytes # 0.8 (L) 1.0 - 4.8 thou/mm3    Monocytes # 0.7 0.4 - 1.3 thou/mm3    Eosinophils # 0.2 0.0 - 0.4 thou/mm3    Basophils # 0.0 0.0 - 0.1 thou/mm3 Immature Grans (Abs) 0.02 0.00 - 0.07 thou/mm3    nRBC 0 /100 wbc   Anion Gap   Result Value Ref Range    Anion Gap 15.0 8.0 - 16.0 meq/L   Glomerular Filtration Rate, Estimated   Result Value Ref Range    Est, Glom Filt Rate 15 (A) LT/ECV/1.39L2   Basic Metabolic Panel   Result Value Ref Range    Sodium 144 135 - 145 meq/L    Potassium 4.0 3.5 - 5.2 meq/L    Chloride 103 98 - 111 meq/L    CO2 21 (L) 23 - 33 meq/L    Glucose 71 70 - 108 mg/dL    BUN 57 (H) 7 - 22 mg/dL    CREATININE 4.6 (HH) 0.4 - 1.2 mg/dL    Calcium 9.7 8.5 - 10.5 mg/dL   Magnesium   Result Value Ref Range    Magnesium 1.9 1.6 - 2.4 mg/dL   Iron   Result Value Ref Range    Iron 30 (L) 65 - 195 ug/dL   Ferritin   Result Value Ref Range    Ferritin 70 22 - 322 ng/mL   IRON SATURATION   Result Value Ref Range    Iron Saturation 10 (L) 20 - 50 %   Vitamin B12 & folate   Result Value Ref Range    Vitamin B-12 567 211 - 911 pg/mL    Folate 11.8 4.8 - 24.2 ng/mL   Iron Binding Capacity   Result Value Ref Range    TIBC 294 171 - 450 ug/dL   Glomerular Filtration Rate, Estimated   Result Value Ref Range    Est, Glom Filt Rate 12 (A) ml/min/1.73m2   Anion Gap   Result Value Ref Range    Anion Gap 20.0 (H) 8.0 - 16.0 meq/L   Urine with Reflexed Micro   Result Value Ref Range    Glucose, Ur NEGATIVE NEGATIVE mg/dl    Bilirubin Urine NEGATIVE NEGATIVE    Ketones, Urine NEGATIVE NEGATIVE    Specific Gravity, Urine 1.009 1.002 - 1.03    Blood, Urine NEGATIVE NEGATIVE    pH, UA 7.0 5.0 - 9.0    Protein,  (A) NEGATIVE    Urobilinogen, Urine 0.2 0.0 - 1.0 eu/dl    Nitrite, Urine NEGATIVE NEGATIVE    Leukocyte Esterase, Urine NEGATIVE NEGATIVE    Color, UA YELLOW STRAW-YELL    Character, Urine CLEAR CLEAR-SL C    RBC, UA 0-2 0-2/hpf /hpf    WBC, UA NONE SEEN 0-4/hpf /hpf    Epi Cells NONE SEEN 3-5/hpf /hpf    Bacteria, UA NONE FEW/NONE S /hpf    Casts UA NONE SEEN NONE SEEN /lpf    Crystals NONE SEEN NONE SEEN    Renal Epithelial, Urine NONE SEEN NONE SEEN Yeast, UA NONE SEEN NONE SEEN    CASTS 2 NONE SEEN NONE SEEN /lpf    MISCELLANEOUS 2 NONE SEEN    Basic Metabolic Panel   Result Value Ref Range    Sodium 140 135 - 145 meq/L    Potassium 3.9 3.5 - 5.2 meq/L    Chloride 99 98 - 111 meq/L    CO2 24 23 - 33 meq/L    Glucose 73 70 - 108 mg/dL    BUN 65 (H) 7 - 22 mg/dL    CREATININE 4.8 (HH) 0.4 - 1.2 mg/dL    Calcium 9.0 8.5 - 10.5 mg/dL   Anion Gap   Result Value Ref Range    Anion Gap 17.0 (H) 8.0 - 16.0 meq/L   Glomerular Filtration Rate, Estimated   Result Value Ref Range    Est, Glom Filt Rate 12 (A) OC/FVA/4.65D1   Basic Metabolic Panel   Result Value Ref Range    Sodium 140 135 - 145 meq/L    Potassium 3.2 (L) 3.5 - 5.2 meq/L    Chloride 98 98 - 111 meq/L    CO2 20 (L) 23 - 33 meq/L    Glucose 126 (H) 70 - 108 mg/dL    BUN 81 (H) 7 - 22 mg/dL    CREATININE 5.2 (HH) 0.4 - 1.2 mg/dL    Calcium 8.5 8.5 - 10.5 mg/dL   Anion Gap   Result Value Ref Range    Anion Gap 22.0 (H) 8.0 - 16.0 meq/L   Glomerular Filtration Rate, Estimated   Result Value Ref Range    Est, Glom Filt Rate 11 (A) PA/IYD/6.59B6   Basic Metabolic Panel   Result Value Ref Range    Sodium 145 135 - 145 meq/L    Potassium 4.7 3.5 - 5.2 meq/L    Chloride 103 98 - 111 meq/L    CO2 23 23 - 33 meq/L    Glucose 86 70 - 108 mg/dL    BUN 81 (H) 7 - 22 mg/dL    CREATININE 5.2 (HH) 0.4 - 1.2 mg/dL    Calcium 9.0 8.5 - 10.5 mg/dL   Anion Gap   Result Value Ref Range    Anion Gap 19.0 (H) 8.0 - 16.0 meq/L   Glomerular Filtration Rate, Estimated   Result Value Ref Range    Est, Glom Filt Rate 11 (A) BW/KGK/8.34E9   Basic Metabolic Panel   Result Value Ref Range    Sodium 139 135 - 145 meq/L    Potassium 3.8 3.5 - 5.2 meq/L    Chloride 102 98 - 111 meq/L    CO2 21 (L) 23 - 33 meq/L    Glucose 80 70 - 108 mg/dL    BUN 71 (H) 7 - 22 mg/dL    CREATININE 5.2 (HH) 0.4 - 1.2 mg/dL    Calcium 8.5 8.5 - 10.5 mg/dL   Anion Gap   Result Value Ref Range    Anion Gap 16.0 8.0 - 16.0 meq/L   Glomerular Filtration Rate, Estimated   Result Value Ref Range    Est, Glom Filt Rate 11 (A) ml/min/1.73m2   Troponin   Result Value Ref Range    Troponin T 0.047 (A) ng/ml   CBC   Result Value Ref Range    WBC 10.4 4.8 - 10.8 thou/mm3    RBC 3.42 (L) 4.70 - 6.10 mill/mm3    Hemoglobin 10.9 (L) 14.0 - 18.0 gm/dl    Hematocrit 33.5 (L) 42.0 - 52.0 %    MCV 98.0 (H) 80.0 - 94.0 fL    MCH 31.9 26.0 - 33.0 pg    MCHC 32.5 32.2 - 35.5 gm/dl    RDW-CV 14.2 11.5 - 14.5 %    RDW-SD 50.5 (H) 35.0 - 45.0 fL    Platelets 948 128 - 165 thou/mm3    MPV 9.3 (L) 9.4 - 12.4 fL   Basic Metabolic Panel   Result Value Ref Range    Sodium 141 135 - 145 meq/L    Potassium 4.1 3.5 - 5.2 meq/L    Chloride 104 98 - 111 meq/L    CO2 17 (L) 23 - 33 meq/L    Glucose 79 70 - 108 mg/dL    BUN 72 (H) 7 - 22 mg/dL    CREATININE 4.8 (HH) 0.4 - 1.2 mg/dL    Calcium 8.8 8.5 - 10.5 mg/dL   Magnesium   Result Value Ref Range    Magnesium 2.0 1.6 - 2.4 mg/dL   Anion Gap   Result Value Ref Range    Anion Gap 20.0 (H) 8.0 - 16.0 meq/L   Glomerular Filtration Rate, Estimated   Result Value Ref Range    Est, Glom Filt Rate 12 (A) ml/min/1.73m2   Troponin   Result Value Ref Range    Troponin T 0.107 (A) ng/ml   Troponin   Result Value Ref Range    Troponin T 1.300 (A) ng/ml   CBC   Result Value Ref Range    WBC 12.7 (H) 4.8 - 10.8 thou/mm3    RBC 3.26 (L) 4.70 - 6.10 mill/mm3    Hemoglobin 10.1 (L) 14.0 - 18.0 gm/dl    Hematocrit 32.5 (L) 42.0 - 52.0 %    MCV 99.7 (H) 80.0 - 94.0 fL    MCH 31.0 26.0 - 33.0 pg    MCHC 31.1 (L) 32.2 - 35.5 gm/dl    RDW-CV 14.2 11.5 - 14.5 %    RDW-SD 51.4 (H) 35.0 - 45.0 fL    Platelets 902 978 - 298 thou/mm3    MPV 9.3 (L) 9.4 - 12.4 fL   APTT   Result Value Ref Range    aPTT 185.0 (HH) 22.0 - 38.0 seconds   APTT   Result Value Ref Range    aPTT 47.3 (H) 22.0 - 38.0 seconds   Basic Metabolic Panel   Result Value Ref Range    Sodium 140 135 - 145 meq/L    Potassium 3.8 3.5 - 5.2 meq/L    Chloride 103 98 - 111 meq/L    CO2 17 (L) 23 - 33 meq/L    Glucose Glucose 68 (L) 70 - 108 mg/dL    BUN 60 (H) 7 - 22 mg/dL    CREATININE 4.7 (HH) 0.4 - 1.2 mg/dL    Calcium 8.6 8.5 - 10.5 mg/dL   APTT   Result Value Ref Range    aPTT 89.6 (H) 22.0 - 38.0 seconds   Anion Gap   Result Value Ref Range    Anion Gap 14.0 8.0 - 16.0 meq/L   Glomerular Filtration Rate, Estimated   Result Value Ref Range    Est, Glom Filt Rate 12 (A) TY/REF/2.22P4   Basic Metabolic Panel   Result Value Ref Range    Sodium 141 135 - 145 meq/L    Potassium 4.3 3.5 - 5.2 meq/L    Chloride 102 98 - 111 meq/L    CO2 23 23 - 33 meq/L    Glucose 99 70 - 108 mg/dL    BUN 67 (H) 7 - 22 mg/dL    CREATININE 4.9 (HH) 0.4 - 1.2 mg/dL    Calcium 8.5 8.5 - 10.5 mg/dL   Anion Gap   Result Value Ref Range    Anion Gap 16.0 8.0 - 16.0 meq/L   Glomerular Filtration Rate, Estimated   Result Value Ref Range    Est, Glom Filt Rate 11 (A) ml/min/1.73m2   POCT glucose   Result Value Ref Range    POC Glucose 123 (H) 70 - 108 mg/dl   POCT glucose   Result Value Ref Range    POC Glucose 90 70 - 108 mg/dl   POCT glucose   Result Value Ref Range    POC Glucose 155 (H) 70 - 108 mg/dl   POCT glucose   Result Value Ref Range    POC Glucose 74 70 - 108 mg/dl   POCT glucose   Result Value Ref Range    POC Glucose 80 70 - 108 mg/dl   POCT glucose   Result Value Ref Range    POC Glucose 110 (H) 70 - 108 mg/dl   POCT glucose   Result Value Ref Range    POC Glucose 83 70 - 108 mg/dl   POCT glucose   Result Value Ref Range    POC Glucose 146 (H) 70 - 108 mg/dl   POCT glucose   Result Value Ref Range    POC Glucose 75 70 - 108 mg/dl   POCT glucose   Result Value Ref Range    POC Glucose 195 (H) 70 - 108 mg/dl   POCT glucose   Result Value Ref Range    POC Glucose 133 (H) 70 - 108 mg/dl   POCT glucose   Result Value Ref Range    POC Glucose 281 (H) 70 - 108 mg/dl   POCT glucose   Result Value Ref Range    POC Glucose 84 70 - 108 mg/dl   POCT glucose   Result Value Ref Range    POC Glucose 94 70 - 108 mg/dl   POCT glucose   Result Value Ref Range POC Glucose 198 (H) 70 - 108 mg/dl   POCT glucose   Result Value Ref Range    POC Glucose 112 (H) 70 - 108 mg/dl   POCT glucose   Result Value Ref Range    POC Glucose 82 70 - 108 mg/dl   POCT glucose   Result Value Ref Range    POC Glucose 135 (H) 70 - 108 mg/dl   POCT glucose   Result Value Ref Range    POC Glucose 164 (H) 70 - 108 mg/dl   POCT glucose   Result Value Ref Range    POC Glucose 226 (H) 70 - 108 mg/dl   POCT glucose   Result Value Ref Range    POC Glucose 92 70 - 108 mg/dl   POCT glucose   Result Value Ref Range    POC Glucose 128 (H) 70 - 108 mg/dl   POCT glucose   Result Value Ref Range    POC Glucose 89 70 - 108 mg/dl   POCT Glucose   Result Value Ref Range    POC Glucose 165 (H) 70 - 108 mg/dl   POCT glucose   Result Value Ref Range    POC Glucose 71 70 - 108 mg/dl   POCT activated clotting time   Result Value Ref Range    Activated Clotting Time 213 (H) 1 - 150 seconds   POCT glucose   Result Value Ref Range    POC Glucose 94 70 - 108 mg/dl   POCT glucose   Result Value Ref Range    POC Glucose 56 (L) 70 - 108 mg/dl   POCT glucose   Result Value Ref Range    POC Glucose 65 (L) 70 - 108 mg/dl   POCT glucose   Result Value Ref Range    POC Glucose 130 (H) 70 - 108 mg/dl   POCT glucose   Result Value Ref Range    POC Glucose 158 (H) 70 - 108 mg/dl   POCT glucose   Result Value Ref Range    POC Glucose 117 (H) 70 - 108 mg/dl   POCT glucose   Result Value Ref Range    POC Glucose 155 (H) 70 - 108 mg/dl   POCT glucose   Result Value Ref Range    POC Glucose 75 70 - 108 mg/dl   POCT glucose   Result Value Ref Range    POC Glucose 144 (H) 70 - 108 mg/dl   POCT glucose   Result Value Ref Range    POC Glucose 161 (H) 70 - 108 mg/dl   POCT glucose   Result Value Ref Range    POC Glucose 74 70 - 108 mg/dl   POCT glucose   Result Value Ref Range    POC Glucose 169 (H) 70 - 108 mg/dl   POCT glucose   Result Value Ref Range    POC Glucose 96 70 - 108 mg/dl   POCT glucose   Result Value Ref Range    POC move about with assist as indicated or with supervision.)    Follow-up:  in the next few days with Nilda Shen MD,  in the next few weeks with Cardiology and Nephrology    Disposition: home    Condition: Stable    Time Spent: 40 minutes    Electronically signed by Luiza Alas MD on 8/23/2019 at 7:47 AM    Attending Physician

## 2019-08-29 NOTE — TELEPHONE ENCOUNTER
[facility] informed by Phone. Patient stated he started coughing up quite a bit of blood and its lasting throughout today. Referred to the ER today- he didn't want to go but instead try the ATB first, spoke with Dr Rocio Fulton and he stated the ER as well. Called and informed daughter- she will take him.

## 2019-09-03 NOTE — PROGRESS NOTES
Kvng Multani is a 80 y. o.oldmale came for follow up regarding his chronic kidney disease, Stage IV of several year duration. Delmy Choi's most recent creatinine is 4.9 above his previous baseline of 3.8-4.3. He developed Acute Kidney Injury while in the hospital 2nd to diuretic therapy. He subsequently had a cardiac cath 7/19/19 without intervention. His creatinine was 4.9 at discharge on 7/22/19. Reports c/o of bronchitis and is following with PCP. Not eating as well as usual. Reports heart rate into 150s this morning. State compliance with meds and denies adverse effects. Denies new issue with diabetes. Recent A1c 4.8. Denies dysuria. denies edema. The pt denies use of NSAIDs. Has a good appetite and gaining wt. Recent Wts and BP Noted and Reviewed  Wt Readings from Last 3 Encounters:   09/03/19 151 lb 12.8 oz (68.9 kg)   08/27/19 148 lb 6 oz (67.3 kg)   08/14/19 150 lb (68 kg)     BP Readings from Last 3 Encounters:   09/03/19 130/70   08/27/19 (!) 140/80   08/14/19 (!) 172/80     Body mass index is 22.42 kg/m².       PAST MEDICAL HISTORY:       Diagnosis Date    Anemia, iron deficiency 10/2016    Arthritis     Atonic bladder     Atrial fibrillation (HCC)     BPH (benign prostatic hyperplasia)     Bronchitis     CAD (coronary artery disease)     CKD (chronic kidney disease) stage 3, GFR 30-59 ml/min ([address]     Gallstones 04/09/2018    Hard of hearing     Hyperlipidemia 2000    Hypertension 1990s    Melanoma ([address] 6/10    PAD (peripheral artery disease) ([address] 10/14    Proteinuria     Renal insufficiency 1998    Type II or unspecified type diabetes mellitus without mention of complication, not stated as uncontrolled 12/00     SURGICAL HISTORY:    Past Surgical History:   Procedure Laterality Date    DENTAL SURGERY      ERCP N/A 4/11/2018    ERCP STENT INSERTION performed by Grayson Landin MD at [facility]    ERCP  4/11/2018    ERCP STONE REMOVAL performed by Grayson Landin MD at [facility] [address] Left 12/19/13    canthoplasy    HERNIA REPAIR Bilateral 9/22/14    Bilateral Inguinal Hernia Repair with Mesh    LYMPHADENECTOMY      MOHS SURGERY Right 8/29/13    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 His smoking use included cigarettes. He has a 50.00 pack-year smoking history. He has never used smokeless tobacco.  ETOH:   reports that he does not drink alcohol. REVIEW OF SYSTEMS:   GENERAL: Stable weight. Pleasant  HEENT: Denies recent vision change, Denies Upper respiratory complaints  CARDIOVASCULAR: Denies edema  RESPIRATORY:Denies sob, cough, wheezing  ABDOMEN: Denies nausea, vomiting, diarrhea  CNS:Denies headache, dizziness  MUSCULOSKELETAL: Reports joint arthralgia  SKIN: Denies rash, pruritis  HEMATOLOGIC: Denies bruising  ENDOCRINE:  Negative for heat or cold intolerance     EXAM:  VITALS:  /70 (Site: Left Upper Arm, Position: Sitting, Cuff Size: Medium Adult)   Pulse 91   Ht 5' 9\" (1.753 m)   Wt 151 lb 12.8 oz (68.9 kg)   SpO2 98%   BMI 22.42 kg/m²    Body mass index is 22.42 kg/m². CONSTITUTIONAL:  No acute distress. Sitting comfortable in chair  HEENT:  Head is normocephalic, Extraocular movement intact,  Neck is supple  CARDIOVASCULAR:  No lower extremity edema. Irregular,   RESPIRATORY: No shortness of breath. Exp wheezes. ABDOMEN: soft  NEUROLOGICAL: Patient is alert and oriented to person, place, and time.  Recent and remote memory is intact. Thought is coherant. SKIN: No rash on exposed surfaces  MUSCULOSKELETAL: Movement is coordinated. EXTREMITIES: Distal lower extremity temp is warm,   PSYCHIATRIC: mood and affect appropriate    Meds reviewed and discussed with pt  Current Outpatient Medications   Medication Sig Dispense Refill    sodium bicarbonate 650 MG tablet Take 2 tablets by mouth 3 times daily 180 tablet 2    ondansetron (ZOFRAN) 4 MG tablet Take 1 tablet by mouth 3 times daily as needed for Nausea or Vomiting 15 tablet 0    metoprolol succinate (TOPROL XL) 100 MG extended release tablet Take 0.5 tablets by mouth every morning 90 tablet 0    isosorbide mononitrate (IMDUR) 30 MG extended release tablet Take 2 tablets by mouth daily 30 tablet 3    hydrALAZINE (APRESOLINE) 50 MG tablet Take 1 tablet by mouth 3 times daily 135 tablet 3    atorvastatin (LIPITOR) 80 MG tablet Take 1 tablet by mouth nightly 30 tablet 3    apixaban (ELIQUIS) 2.5 MG TABS tablet Take 1 tablet by mouth 2 times daily 60 tablet 5    glimepiride (AMARYL) 1 MG tablet Take 0.5 tablets by mouth every morning (before breakfast) 90 tablet 1    furosemide (LASIX) 40 MG tablet Take 1 tablet by mouth daily 60 tablet 3    nitroGLYCERIN (NITROSTAT) 0.4 MG SL tablet up to max of 3 total doses. If no relief after 1 dose, call 911. 25 tablet 3    cloNIDine (CATAPRES) 0.3 MG tablet TAKE 1 TABLET BY MOUTH THREE TIMES DAILY 270 tablet 2    clopidogrel (PLAVIX) 75 MG tablet Take 1 tablet by mouth daily 90 tablet 1    blood glucose test strips (ONETOUCH VERIO) strip Check blood sugar once daily Dx: 250.00 100 each 3    vitamin E 400 UNIT capsule Take 1 capsule by mouth daily 90 capsule 3    ferrous sulfate 325 (65 Fe) MG tablet Take 325 mg by mouth daily (with breakfast)      ONETOUCH DELICA LANCETS 39R MISC Check blood sugar once daily.  Dx: 250.00 100 each 1    Cholecalciferol (VITAMIN D PO) Take 2,000 Units by mouth 2 times daily        No current

## 2019-09-03 NOTE — PROGRESS NOTES
Administrations This Visit     albuterol (PROVENTIL) nebulizer solution 2.5 mg     Admin Date  09/03/2019  12:15 Action  Given Dose  2.5 mg Route  Nebulization Site   Administered By  Cyndy Mclain LPN    Ordering Provider:  Jena Hernandez MD    Patient Supplied?:  No
person, place, and time. He appears well-developed and well-nourished. He is cooperative. HENT:   Head: Normocephalic and atraumatic. Right Ear: External ear normal.   Left Ear: External ear normal.   Nose: Nose normal.   Mouth/Throat: Oropharynx is clear and moist.   Eyes: Pupils are equal, round, and reactive to light. Conjunctivae and EOM are normal. No scleral icterus. Neck: Normal range of motion. Neck supple. No JVD present. No thyromegaly present. Cardiovascular: Normal rate, regular rhythm and intact distal pulses. Exam reveals no friction rub. No murmur heard. Pulmonary/Chest: Effort normal. He has decreased breath sounds. He has wheezes. He has rhonchi. He has no rales. He exhibits no tenderness. Abdominal: Soft. Bowel sounds are normal. He exhibits no mass. There is no tenderness. Musculoskeletal: He exhibits no edema. Lymphadenopathy:     He has no cervical adenopathy. Neurological: He is alert and oriented to person, place, and time. Skin: No rash noted. Vitals reviewed. Assessment:         Diagnosis Orders   1. COPD with acute exacerbation ([address]  [place] PRESSURIZED/NONPRESSURIZED INHALATION TREATMENT    DME Order for Nebulizer as OP    XR CHEST STANDARD (2 VW)    albuterol (PROVENTIL) nebulizer solution 2.5 mg   2. Diabetes mellitus due to underlying condition with stage 4 chronic kidney disease, unspecified whether long term insulin use (HCC)     3. Stage 5 chronic kidney disease not on chronic dialysis ([address]     4. Chronic atrial fibrillation (HCC)     5.  Essential hypertension         Plan:      Medications Prescribed:  Orders Placed This Encounter   Medications    ipratropium-albuterol (DUONEB) 0.5-2.5 (3) MG/3ML SOLN nebulizer solution     Sig: Inhale 3 mLs into the lungs every 4 hours     Dispense:  30 vial     Refill:  1    Nebulizers (NEBULIZER COMPRESSOR) MISC     Sig: Albuterol 1 unit dose every 6 hours for wheezing and chest congestion     Dispense:  1 each

## 2019-09-05 NOTE — TELEPHONE ENCOUNTER
Kedar Owens from [employer] called. Questioned when and where his first Hep B was received. Was it in July while he was inpatient? Please Advise.  Thank you

## 2019-09-06 NOTE — ED PROVIDER NOTES
by the radiologist suggesting pneumonia    LABS:   Labs Reviewed   CBC WITH AUTO DIFFERENTIAL - Abnormal; Notable for the following components:       Result Value    RBC 2.75 (*)     Hemoglobin 8.8 (*)     Hematocrit 27.5 (*)     .0 (*)     MCHC 32.0 (*)     RDW-SD 49.4 (*)     Lymphocytes Absolute 0.6 (*)     All other components within normal limits   BASIC METABOLIC PANEL - Abnormal; Notable for the following components:    Sodium 146 (*)     CO2 20 (*)     Glucose 149 (*)     BUN 68 (*)     CREATININE 6.4 (*)     All other components within normal limits   HEPATIC FUNCTION PANEL - Abnormal; Notable for the following components:    Alkaline Phosphatase 174 (*)     AST 54 (*)     All other components within normal limits   TROPONIN - Abnormal; Notable for the following components:    Troponin T 0.866 (*)     All other components within normal limits   BRAIN NATRIURETIC PEPTIDE - Abnormal; Notable for the following components:    Pro-BNP 76514.0 (*)     All other components within normal limits   ANION GAP - Abnormal; Notable for the following components:    Anion Gap 23.0 (*)     All other components within normal limits   GLOMERULAR FILTRATION RATE, ESTIMATED - Abnormal; Notable for the following components:    Est, Glom Filt Rate 8 (*)     All other components within normal limits   OSMOLALITY - Abnormal; Notable for the following components:    Osmolality Calc 313.1 (*)     All other components within normal limits   CULTURE BLOOD #1   CULTURE BLOOD #2   LACTATE, SEPSIS   LACTATE, SEPSIS       EMERGENCY DEPARTMENT COURSE:   Vitals:    Vitals:    09/06/19 1339 09/06/19 1349 09/06/19 1411   BP: (!) 154/107  128/82   Pulse: 151  114   Resp: 18  21   Temp:   98.6 °F (37 °C)   SpO2: 95%  93%   Weight:  140 lb (63.5 kg)    Height:  5' 9\" (1.753 m)       This patient was sent in for atrial fibrillation with rapid ventricular response.   We started him on a Cardizem bolus and drip and we have some improvement

## 2019-09-06 NOTE — ED NOTES
Patient much more calm. States he does not feel much better. Patient sitting on edge of bed, requesting to go upstairs in a wheelchair. Floor notified of patient departure.      Sharmin Spencer RN  09/06/19 [id number]

## 2019-09-06 NOTE — H&P
History & Physical        Patient:  Young Gutierrez  YOB: 1933    MRN: [id number]     Acct: [de-identified]    PCP: Dewey Helton MD    Date of Admission: 9/6/2019     ASSESSMENT/PLAN:    1. Afib RVR-history of PAF, failed previous cardioversion. IV Cardizem for rate control. BB.  2. Acute hypoxic respiratory failure multifactorial secondary to CAP, heart failure. No chronic oxygen use now on 5L nasal cannula. May require bipap. 3. CAP-CXR demonstrates right lung base consolidation. Continue Levaquin. Wean oxygen. Acapella. Bronchodilators related to auscultated wheezing. Ordered sputum culture. 4. Acute on chronic systolic heart failure, EF 50% 7/19 with component of diastolic failure. Admission BNP 50,515 was 9800 7/12/19. Lasix IV ordered stat related to assessed respiratory distress & hypoxemia. 5. Elevated troponin- 0.866 S/P cardiac cath 7/19/19 with noted LAD occulsion. Previous troponin on 7/19 1.30. EKG showed lateral ST depression/AFIB RVR. Consult Cardiology. BB  6. GLENYS imposed on CKD stage 5, not on dialysis. Consult Nephrology for fluid balance management. 7. Hypernatremia-sodium 146, osmolality calc 313.1. Appears clinically dry. Hold sodium bicarb. 8. High anion gap metabolic acidosis secondary to CKD. Monitor. 9. Lactic acidosis- admission value 2.2, no elevated WBC, afebrile. Blood cultures pending. Trend. 10. Accelerated hypertension-BB, continue home anit-hypertensive medication. 11. DM type 2-SSI, hold home Amaryl. 12. Anemia of chronic disease-no signs active bleeding. Outpatient plan for Aranesp. Trend. Contacted Dr. Lisa Oneil per telephone and updated him on pt. current status. He will resume care in the am.     Date of Service: Pt seen/examined on 09/06/19  and Admitted to Inpatient with expected LOS greater than two midnights due to medical therapy.      Chief Complaint:  Increased heart rate       History Of Present smoking use included cigarettes. He has a 50.00 pack-year smoking history. He has never used smokeless tobacco.  ETOH:   reports that he does not drink alcohol. Family History:      Positive as follows:        Problem Relation Age of Onset    Stroke Mother     Heart Disease Father        Diet:  Low sodium, renal,carb count    REVIEW OF SYSTEMS:   Pertinent positives as noted in the HPI. All other systems reviewed and negative. PHYSICAL EXAM:    BP (!) 166/125   Pulse 130   Temp 98.6 °F (37 °C)   Resp 24   Ht 5' 9\" (1.753 m)   Wt 140 lb (63.5 kg)   SpO2 95%   BMI 20.67 kg/m²     General appearance:  Anxious, appears stated age and cooperative. HEENT:  Normal cephalic, atraumatic without obvious deformity. Pupils equal, round, and reactive to light. Neck: Supple, with full range of motion. Trachea midline. Respiratory:  Tachypnea without retractions. Bilateral auscultation with rales throughout, scattered wheezes-no appreciable rhonchi. Cardiovascular:  Irregular rate and rhythm with normal S1/S2 without murmurs, rubs or gallops. Abdomen: Soft, non-tender, Distended with hypoactive bowel sounds. Musculoskeletal:  No clubbing or cyanosis. 4+ edema bilaterally lower extremities. Full range of motion without deformity. Skin: Skin color aple, texture very dry, turgor delayed. Scattered ecchymosis. No rashes or lesions. Neurologic:  Neurovascularly intact without any focal sensory/motor deficits.  Cranial nerves: II-XII intact, grossly non-focal.  Psychiatric:  Alert and oriented, thought content appropriate, normal insight  Capillary Refill: Brisk,< 3 seconds   Peripheral Pulses: +2 palpable, equal bilaterally       Labs:     Recent Labs     09/06/19  1346   WBC 7.8   HGB 8.8*   HCT 27.5*        Recent Labs     09/06/19  1346   *   K 4.1      CO2 20*   BUN 68*   CREATININE 6.4*   CALCIUM 9.4     Recent Labs     09/06/19  1346   AST 54*   ALT 31   BILIDIR 0.3   BILITOT

## 2019-09-07 PROBLEM — N28.9 KIDNEY DISEASE WITH FLUID RETENTION: Status: RESOLVED | Noted: 2019-01-01 | Resolved: 2019-01-01

## 2019-09-07 PROBLEM — D50.8 IRON DEFICIENCY ANEMIA DUE TO DIETARY CAUSES: Status: RESOLVED | Noted: 2019-01-01 | Resolved: 2019-01-01

## 2019-09-07 PROBLEM — I48.91 A-FIB (HCC): Status: RESOLVED | Noted: 2018-05-31 | Resolved: 2019-01-01

## 2019-09-07 PROBLEM — I48.91 A-FIB (HCC): Status: ACTIVE | Noted: 2019-01-01

## 2019-09-07 PROBLEM — I21.4 NON-ST ELEVATION MI (NSTEMI) (HCC): Status: RESOLVED | Noted: 2018-04-13 | Resolved: 2019-01-01

## 2019-09-07 PROBLEM — I50.9 ACUTE CONGESTIVE HEART FAILURE (HCC): Status: RESOLVED | Noted: 2019-01-01 | Resolved: 2019-01-01

## 2019-09-07 NOTE — PROGRESS NOTES
Second assessment completed. No change from previous assessment except for: Temp:98, pulse: 74, Respirations:19, BP:129/80, Pulse Oximeter:94. Rhonchi in upper left and right anterior lobes. Patient is attempting to rest, eyes are closed. Skin pale and dry.    H.Buerger [address]

## 2019-09-07 NOTE — PROGRESS NOTES
This RCP placed this pt on a contious pulse ox. Upon turning it on this pt's o2 sat was 84%. This RCP placed pt on a venturi at 50% at 12 lpm. pts o2 sat was at 87-88%. Pt had to take off mask due to nauseous. Once orders were placed for high flow. Pt is refusing to let us go higher than 15 lpm. Pt's o2 sat on 15 lpm at 100% is 89%.

## 2019-09-07 NOTE — H&P
[facility]    Patient - Dorothy Badillo   MRN -  [id number]   [facility]   - 9/15/1933      Date of Admission -  2019  1:33 PM  Date of evaluation -  2019  Room - 4D--DIONNE Curry MD Primary Care Physician - Jeanette Camargo MD   Chief Complaint   Palpitations   Active Hospital Problem List      Active Hospital Problems    Diagnosis Date Noted    Acute on chronic congestive heart failure [facility]) [I50.9]      Priority: High    Acute on chronic combined systolic and diastolic congestive heart failure (HCC) [I50.43]      Priority: High    Pneumonia [J18.9] 2018     Priority: High    A-fib (HCC) [I48.91] 2019    Stage 5 chronic kidney disease not on chronic dialysis ([facility] [N18.5] 2019    Anemia of chronic disease [D63.8] 2018    Chronic obstructive pulmonary disease with acute exacerbation (HCC) [J44.1]     Acute respiratory failure with hypoxia (HCC) [J96.01] 2018    Hyperparathyroidism, secondary renal ([facility] [N25.81] 2018    Diabetes mellitus with renal manifestation ([facility] [E11.29] 2016    Essential hypertension [I10] 2016    Atrial fibrillation with RVR ([facility] [I48.91] 10/15/2015    CAD (coronary artery disease) [I25.10] 2015     HPI   80 y.o. male with a past medical history of atrial fibrillation, CAD, CKD 5, HTN, DM, HLD, and PAD who presented to [facility] ER from an appointment with his cardiologist, Dr. Anca Kothari  Referred to ER due to AFIB RVR per EKG completed in office. He received metoprolol and eliquis. CXR showed RLL infiltrates and pulmonary edema. He was started on antibiotics and bumex drip but he didn't repsond. He required high flow nasal canula. He was later transferred to the ICU to start HD. Recent ECHO showed EF 55%.    Past Medical History         Diagnosis Date    Anemia, iron deficiency 10/2016    Arthritis     Atonic bladder     Atrial fibrillation (HCC)     BPH (benign prostatic hyperplasia)     Bronchitis     CAD (coronary artery disease)     CKD (chronic kidney disease) stage 3, GFR 30-59 ml/min (HCC)     Gallstones 2018    Hard of hearing     Hyperlipidemia 2000    Hypertension 1990s    Melanoma ([facility] 6/10    PAD (peripheral artery disease) ([facility] 10/14    Proteinuria     Renal insufficiency     Type II or unspecified type diabetes mellitus without mention of complication, not stated as uncontrolled       Past Surgical History           Procedure Laterality Date    DENTAL SURGERY      ERCP N/A 2018    ERCP STENT INSERTION performed by Maddy [facility]ada MD [facility]    ERCP  2018    ERCP STONE REMOVAL performed by [facility]ada MD at [facility]  Left 13    canthoplasy    HERNIA REPAIR Bilateral 14    Bilateral Inguinal Hernia Repair with Mesh    LYMPHADENECTOMY      MOHS SURGERY Right 13    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    SKIN CANCER EXCISION      SKIN CANCER EXCISION  2013    right arm     Diet    DIET RENAL; Carb Control: 5 carb choices (75 gms)/meal  Allergies    Diatrizoate; Piperacillin-tazobactam in dex; and Dye [iodides]  Social History     Social History     Socioeconomic History    Marital status:      Spouse name: Lia Brink Number of children: 3    Years of education: 7    Highest education level: Not on file   Occupational History    Occupation: retired   Social Needs    Financial resource strain: Not on file    Food insecurity:     Worry: Not on file     Inability: Not on file   saambaa needs:     Medical: Not on file     Non-medical: Not on file   Tobacco Use    Smoking status: Former Smoker     Packs/day: 2.00     Years: 25.00     Pack years: 50.00     Types: Cigarettes     Last attempt to quit: 1979     Years since quittin.0  Smokeless tobacco: Never Used    Tobacco comment: Quit smoking 45 years ago   Substance and Sexual Activity    Alcohol use: No     Alcohol/week: 0.0 standard drinks    Drug use: No    Sexual activity: Not Currently   Lifestyle    Physical activity:     Days per week: Not on file     Minutes per session: Not on file    Stress: Not on file   Relationships    Social connections:     Talks on phone: Not on file     Gets together: Not on file     Attends Faith service: Not on file     Active member of club or organization: Not on file     Attends meetings of clubs or organizations: Not on file     Relationship status: Not on file    Intimate partner violence:     Fear of current or ex partner: Not on file     Emotionally abused: Not on file     Physically abused: Not on file     Forced sexual activity: Not on file   Other Topics Concern    Not on file   Social History Narrative    Not on file     Family History          Problem Relation Age of Onset    Stroke Mother     Heart Disease Father        ROS    General/Constitutional: Weight gain, fatigue   HENT: Negative. Eyes: Negative. Upper respiratory tract: Recent bronchitis   Cardiovascular: No palpitations, chest pain or edema. Gastrointestinal: No nausea or vomiting. Neurological: No focal neurological weakness. Extremities: LE edema   Musculoskeletal:   Genitourinary: No complaints. Hematological: Negative. Denies easy buising  Skin: No itching.   Meds    Current Medications    cefTRIAXone (ROCEPHIN) IV  1 g Intravenous Q24H    darbepoetin kyler-polysorbate  40 mcg Subcutaneous Weekly    apixaban  2.5 mg Oral BID    mometasone-formoterol  2 puff Inhalation BID    cloNIDine  0.1 mg Oral TID    clopidogrel  75 mg Oral Daily    ferrous sulfate  325 mg Oral Daily with breakfast    hydrALAZINE  50 mg Oral TID    ipratropium-albuterol  1 vial Inhalation Q4H    isosorbide mononitrate  30 mg Oral BID    metoprolol succinate  50 mg Oral QAM  sodium chloride flush  10 mL Intravenous 2 times per day    insulin lispro  0-6 Units Subcutaneous TID WC    insulin lispro  0-3 Units Subcutaneous Nightly     calcium carbonate, glucose, dextrose, glucagon (rDNA), dextrose, LORazepam, sodium chloride flush, ondansetron  IV Drips/Infusions   dextrose      bumetanide 0.1 mg/mL infusion 0.5 mg/hr (09/07/19 0956)    diltiazem 15 mg/hr (09/07/19 1557)     Vitals    Vitals    height is 5' 9\" (1.753 m) and weight is 151 lb 10.8 oz (68.8 kg). His oral temperature is 98.2 °F (36.8 °C). His blood pressure is 134/82 and his pulse is 129. His respiration is 17 and oxygen saturation is 97%. O2 Flow Rate (L/min): 30 L/min  I/O    Intake/Output Summary (Last 24 hours) at 9/7/2019 1704  Last data filed at 9/7/2019 1400  Gross per 24 hour   Intake 846 ml   Output 450 ml   Net 396 ml     Patient Vitals for the past 96 hrs (Last 3 readings):   Weight   09/07/19 1324 151 lb 10.8 oz (68.8 kg)   09/07/19 1322 151 lb 10.8 oz (68.8 kg)   09/07/19 1017 161 lb 4.8 oz (73.2 kg)     Exam   Constitutional: Patient appears chronically ill, flail   Head: Normocephalic and atraumatic. Mouth/Throat: Oropharynx is clear and moist.  No oral thrush. Eyes: Conjunctivae are normal. Pupils are equal, round, and reactive to light. No scleral icterus. Neck: Neck supple. Elevated JVD or tracheal deviation present. Cardiovascular: Regular rate, regular rhythm, S1 and S2 with no murmur. No peripheral edema  Pulmonary/Chest: Diminished breath sounds bilateral, basal rales   Abdominal: Soft. Bowel sounds audible. No distension or tenderness to palp  Musculoskeletal: Moves all extremities  Lymphadenopathy:  No cervical adenopathy. Neurological: Patient is alert and oriented to person, place, and time. Skin: Skin is warm and dry.       Labs   ABG  Lab Results   Component Value Date    PH 7.45 09/07/2019    PO2 48 09/07/2019    PCO2 33 09/07/2019    HCO3 23 09/07/2019    O2SAT 86

## 2019-09-07 NOTE — PROGRESS NOTES
Temp (24hrs), Av °F (36.7 °C), Min:97.6 °F (36.4 °C), Max:98.6 °F (37 °C)    Respirations:  Resp: 21  Pulse:   Pulse: 107  BP:    BP: (!) 143/74  BP Range: Systolic (97NCQ), NNN:035 , Min:128 , UPX:494       Diastolic (32OZG), CMU:41, Min:74, Max:125      Physical Examination:     General:  Awake, alert, appears tachypneic  HEENT: NC/AT/ MMM +JVD  Chest:               End exp wheezes and rhonchi at bases/faint rales  Cardiac:  Appears irregular  Abdomen: Soft, non-tender,  Neuro:  No facial droop, No Asterixis  SKIN:  No rashes, good skin turgor. Extremities:  1+ LE edema, no cyanosis    Labs:       Recent Labs     19  1346 19  0354   WBC 7.8 12.2*   RBC 2.75* 2.92*   HGB 8.8* 9.2*   HCT 27.5* 29.3*   .0* 100.3*   MCH 32.0 31.5   MCHC 32.0* 31.4*    311   MPV 9.7 9.4      BMP:   Recent Labs     19  1346 19  0353   * 145   K 4.1 4.7    102   CO2 20* 18*   BUN 68* 74*   CREATININE 6.4* 7.2*   GLUCOSE 149* 100   CALCIUM 9.4 8.8      Phosphorus:   No results for input(s): PHOS in the last 72 hours. Magnesium:  No results for input(s): MG in the last 72 hours.   Albumin:    Recent Labs     19  1346   LABALBU 3.7     BNP:    No results found for: BNP  JAROD:    No results found for: JAROD  SPEP:  Lab Results   Component Value Date    PROT 6.6 2019     UPEP:   No results found for: LABPE  C3:     Lab Results   Component Value Date    C3 91 2014     C4:     Lab Results   Component Value Date    C4 22 2014     MPO ANCA:   No results found for: MPO  PR3 ANCA:   No results found for: PR3  Anti-GBM:   No results found for: GBMABIGG  Hep BsAg:         Lab Results   Component Value Date    HEPBSAG Negative 2019     Hep C AB:        No results found for: HEPCAB    Urinalysis/Chemistries:      Lab Results   Component Value Date    NITRU NEGATIVE 2019    COLORU YELLOW 2019    PHUR 7.0 2019    LABCAST NONE SEEN 2019    LABCAST NONE SEEN 07/12/2019    WBCUA NONE SEEN 07/14/2019    RBCUA 0-2 07/14/2019    YEAST NONE SEEN 07/14/2019    BACTERIA NONE 07/14/2019    SPECGRAV 1.011 07/12/2019    LEUKOCYTESUR NEGATIVE 07/14/2019    UROBILINOGEN 0.2 07/14/2019    BILIRUBINUR NEGATIVE 07/14/2019    BLOODU NEGATIVE 07/14/2019    GLUCOSEU NEGATIVE 07/14/2019    KETUA NEGATIVE 07/14/2019     Urine Sodium:   No results found for: NAEL  Urine Potassium:  No results found for: KUR  Urine Chloride:  No results found for: CLUR  Urine Osmolarity: No results found for: OSMOU  Urine Protein:   No components found for: TOTALPROTEIN, URINE   Urine Creatinine:     Lab Results   Component Value Date    LABCREA 156.8 01/28/2016     Urine Eosinophils:  No components found for: UEOS        Impression and Plan:  1. Acute kidney injury on chronic kidney disease stage V not yet on hemodialysis - worsening with fluid overload  2. Acute hypoxic resp failure due to volume overload + pneumonia  3. Systolic and diastolic CHF  4. Urinary retention - hinojosa just placed  5. Anemia in CKD, iron stores okay, start IRIS  6. Metabolic acidosis from renal failure  7. Hypertension, stable  8. A fib with RVR, on diltiazem drip  9. Leukocytosis    Hinojosa placed for urinary retention  Start Bumex Drip 2 mg IVP followed by 0.5 mg /hour. Diuril 500 mg IV x 1  Monitor urine output if not significant then will do dialysis today. Please don't hesitate to call with any questions.   Electronically signed by Dian Connor DO on 9/7/2019 at 8:37 AM

## 2019-09-07 NOTE — PROGRESS NOTES
both lung field  IRRR   Abdomen:  Back: soft   Extremities:  Neurological Exam: + edema  WNL       Assessment:     Admitting Diagnosis:    A-fib ([facility] [I48.91]    Active Hospital Problems    Diagnosis Date Noted    Acute on chronic congestive heart failure (HCC) [I50.9]      Priority: High    Acute on chronic combined systolic and diastolic congestive heart failure (HCC) [I50.43]      Priority: High    Chronic obstructive pulmonary disease with acute exacerbation (HCC) [J44.1]      Priority: High    Pneumonia [J18.9] 04/13/2018     Priority: High    Acute respiratory failure with hypoxia (HCC) [J96.01] 04/12/2018     Priority: High    Atrial fibrillation with RVR ([facility]) [I48.91] 10/15/2015     Priority: High    Stage 5 chronic kidney disease not on chronic dialysis ([facility] [N18.5] 08/02/2019     Priority: Medium    Hyperparathyroidism, secondary renal ([facility] [N25.81] 04/09/2018     Priority: Medium    Diabetes mellitus with renal manifestation ([facility] [E11.29] 03/07/2016     Priority: Medium    Essential hypertension [I10] 01/21/2016     Priority: Medium    CAD (coronary artery disease) [I25.10] 05/26/2015     Priority: Medium    Anemia of chronic disease [D63.8] 11/19/2018       Plan:     Discussed plans with the nursing staff  Discussed code and he want full code  Change Levaquin (been on this since 8/29/19) to Rocephin     Medications, Laboratories and Imaging results:    Scheduled Meds:   cefTRIAXone (ROCEPHIN) IV  1 g Intravenous Q24H    apixaban  2.5 mg Oral BID    mometasone-formoterol  2 puff Inhalation BID    cloNIDine  0.1 mg Oral TID    clopidogrel  75 mg Oral Daily    ferrous sulfate  325 mg Oral Daily with breakfast    hydrALAZINE  50 mg Oral TID    ipratropium-albuterol  1 vial Inhalation Q4H    isosorbide mononitrate  30 mg Oral BID    metoprolol succinate  50 mg Oral QAM    sodium chloride flush  10 mL Intravenous 2 times per day    insulin lispro  0-6 Units Subcutaneous TID    

## 2019-09-07 NOTE — FLOWSHEET NOTE
09/07/19 1324 09/07/19 1654   Vital Signs   BP (!) 142/72 128/79   Temp 98.1 °F (36.7 °C)  --    Pulse 117 131   Resp 15  --    SpO2 96 %  --    Weight 151 lb 10.8 oz (68.8 kg) 145 lb 1 oz (65.8 kg)   Weight Method Bed scale Bed scale   Percent Weight Change -5.97 -4.36   Post-Hemodialysis Assessment   Post-Treatment Procedures  --  Blood returned;Catheter Capped, clamped with Saline x2 ports   Machine Disinfection Process  --  Exterior Machine Disinfection   Rinseback Volume (ml)  --  400 ml   Total Liters Processed (l/min)  --  50.8 l/min   Dialyzer Clearance  --  Lightly streaked   Duration of Treatment (minutes)  --  180 minutes   Heparin amount administered during treatment (units)  --  0 units   Hemodialysis Intake (ml)  --  400 ml   Hemodialysis Output (ml)  --  3400 ml   NET Removed (ml)  --  3000 ml   Tolerated Treatment  --  Fair   stable 3 hour treatment, 3 liters net uf. afib rvr throughout.

## 2019-09-07 NOTE — PROGRESS NOTES
2055-Aleksey notified of patients decrease SPO2 level and lactic elevating to 3.4 no new orders told to monitor  0120-Ashleyko notified of continued decrease in SPO2 level and recommendation for high flow by respiratory.  Patient is currently on Cardizem drip and Aleksey  Put in orders for transfer to [address] [place] [address] S RN given report on patient  0145-Patient transferred to [facility]

## 2019-09-07 NOTE — PROGRESS NOTES
Sawyer catheter placed with instructor at bedside, using aseptic technique. Urinary output is clear/yellow. Tubing secured to ilir.    TIMBuerger [facility]

## 2019-09-07 NOTE — PROCEDURES
A Bladder scan was performed at 0804. The residual amount was measured to be >469 ML. Report of results was given to Mehdi Pal RN.

## 2019-09-07 NOTE — H&P
Formulation and discussion of sedation / procedure plans, risks, benefits, side effects and alternatives with patient and/or responsible adult completed.     Electronically signed by Mundo Fletcher MD on 9/7/2019 at 1:09 PM

## 2019-09-07 NOTE — PLAN OF CARE
Problem: Falls - Risk of:  Goal: Will remain free from falls  Description  Will remain free from falls  Outcome: Ongoing  Note:   Call light in reach, bed in lowest position, and bed alarm activated. Education given on use of call light before ambulation and when in need of assistance. Patient expressed understanding. Hourly visual checks performed and charted. Toileting offered to patient. No falls this shift, at any time. Arm band and falling star in place. Will continue to monitor. Problem: Falls - Risk of:  Goal: Absence of physical injury  Description  Absence of physical injury  Outcome: Ongoing  Note:   Call light in reach, bed in lowest position, and bed alarm activated. Education given on use of call light before ambulation and when in need of assistance. Patient expressed understanding. Hourly visual checks performed and charted. Toileting offered to patient. No falls this shift, at any time. Arm band and falling star in place. Will continue to monitor. Problem: Impaired respiratory status  Goal: Clear lung sounds  9/7/2019 0227 by Severo Lee, RN  Outcome: Ongoing  Note:   Patient on heated high flow oxygen. Patient sating in the lower 90s. Patient said his shortness of breath has improved. Patient has expiratory wheezes heard throughout the lung. Problem: Pain:  Goal: Patient's pain/discomfort is manageable  Description  Patient's pain/discomfort is manageable  Outcome: Ongoing  Note:   Patient able to use 0-10 pain scale. Denies pain at this time. Agreeable to take PRN pain medications. Problem: Skin Integrity:  Goal: Skin integrity will stabilize  Description  Skin integrity will stabilize  Outcome: Ongoing  Note:   No signs of skin breakdown. Skin warm, dry, and flaky. Mucous membranes pink and moist.  Assistance with turns/ambulation provided PRN. Will continue to monitor.      Problem: Discharge Planning:  Goal: Patients continuum of care needs are met  Description  Patients continuum of care needs are met  Outcome: Ongoing  Note:   Discharge planning in process and discussed with patient/family. Social work consulted for any additional needs. Care manager aware of discharge needs. Patient is from home with his son and plans to return there. Care plan reviewed with patient and family. Patient and family verbalize understanding of the plan of care and contribute to goal setting.

## 2019-09-08 PROBLEM — Z83.49: Status: RESOLVED | Noted: 2018-07-19 | Resolved: 2019-01-01

## 2019-09-08 PROBLEM — I48.91 A-FIB (HCC): Status: RESOLVED | Noted: 2019-01-01 | Resolved: 2019-01-01

## 2019-09-08 PROBLEM — Z99.2 CHRONIC KIDNEY DISEASE REQUIRING CHRONIC DIALYSIS (HCC): Status: ACTIVE | Noted: 2019-01-01

## 2019-09-08 PROBLEM — D63.1 ANEMIA IN STAGE 4 CHRONIC KIDNEY DISEASE (HCC): Status: RESOLVED | Noted: 2018-04-28 | Resolved: 2019-01-01

## 2019-09-08 PROBLEM — N18.4 ANEMIA IN STAGE 4 CHRONIC KIDNEY DISEASE (HCC): Status: RESOLVED | Noted: 2018-04-28 | Resolved: 2019-01-01

## 2019-09-08 PROBLEM — N18.6 CHRONIC KIDNEY DISEASE REQUIRING CHRONIC DIALYSIS (HCC): Status: ACTIVE | Noted: 2019-01-01

## 2019-09-08 PROBLEM — K92.0 HEMATEMESIS: Status: ACTIVE | Noted: 2019-01-01

## 2019-09-08 PROBLEM — I48.0 PAROXYSMAL ATRIAL FIBRILLATION (HCC): Status: ACTIVE | Noted: 2019-01-01

## 2019-09-08 NOTE — PROGRESS NOTES
0745: Dr. Susan Fletcher in department reviewing patient case. Pitting edema in BLE resolved, Lungs clear today, pt states he overall feels \"better\"     0933: Dr. Radha Nguyen at bedside. Pt vomited 100ml of coffee-ground emesis, flipped back into A-fib 170s, Cardizem restarted at 5mg/hr    -Dr. Radha Nguyen wants to ask Cardiology if we can hold Plavix & Eliquis for tunnel catheter placement    0905: Dr. Jamila Sepulveda secure messaged regarding patient status; Cardizem increased to 10mg/hr    0920: Dr. Jamila Sepulveda at bedside. Stated to d/c Eliquis because it is contraindicated in dialysis patients, continue Plavix for now as IR should be able to do tunneled cath while on Plavix, will plan to start Heparin tomorrow. New orders for Amiodarone bolus & drip.     5183: Dr. Radha Nguyen stated will have tunnel catheter placed in a few days when pt more stable. 4216: Cardizem drip increased to15mg/hr    0948: pt given PO medications with water, pt vomited all medications back up    0952: Amiodarone bolus given    1003: Amiodarone drip initiated     1004: 4mg zofran given     1007: pt continued to have 2 more episodes of coffee-ground emesis. Orders to consult GI    1010: Dr. Luisana Rodriguez consulted, new orders for Protonix drip & EGD    1015: Family notified of the change in patient status & need for EGD, family on their way to the hospital to discuss necessity of EGD procedure with patient & Dr. Luisana Rodriguez     [phone]: Protonix drip started at 8mg/hr     1110: Dr. Jaswinder Bolton at bedside to discuss EGD with patient and family. They refused at this time. Orders to continue to trend hgb     1135: converted to NSR     1232: A-fib RVR     1400: pt had formed, hard BM, still feels constipated     1544: converted to NSR     1637: 4mg zofran given for continued nausea     1710: consent for CVC placement signed     1739: 1mg ativan given for CVC placement     1740: CVC placement initiated per Dr. Susan Fletcher at bedside;  Left subclavian CVC     1830: CVC placement confirmed through CXR.  Heparin started at 12units/kg/hr

## 2019-09-08 NOTE — PROGRESS NOTES
Family Medicine Progress Notes/Coverage    Today's Date: 9/8/19  4D-03/003-A  Medical Record # [id number]  Account # [de-identified]      Mr. Joy [facility] admitted on 9/6/2019    Thank you Dr Frank Olivas, Dr Oliver Celestin, Dr Merced Demarco of seeing Mr Velázquez Dux / Interval History :     Vomited coffee ground material this 8 30 AM, Atrial fib with RVR recurred.   + hurtburn  Currently on dialysis  Reviewed notes, consults, laboratory and radiology results,    Objective:       Physical Exam:  Patient Vitals for the past 24 hrs:   BP Temp Temp src Pulse Resp SpO2 Weight   09/08/19 1008 -- -- -- -- 18 94 % --   09/08/19 1002 (!) 145/98 -- -- 159 20 93 % --   09/08/19 0939 114/72 -- -- 128 17 93 % --   09/08/19 0936 -- -- -- 154 22 94 % --   09/08/19 0915 (!) 145/131 -- -- 145 18 95 % --   09/08/19 0905 -- -- -- 157 18 95 % --   09/08/19 0904 (!) 176/88 -- -- 140 16 94 % --   09/08/19 0835 -- -- -- 166 17 91 % --   09/08/19 0815 (!) 158/70 98.4 °F (36.9 °C) Oral 71 14 95 % --   09/08/19 0800 -- -- -- 69 -- -- --   09/08/19 0700 (!) 148/63 -- -- 68 18 94 % --   09/08/19 0645 (!) 171/59 98.5 °F (36.9 °C) -- 73 14 94 % 137 lb 9.1 oz (62.4 kg)   09/08/19 0628 -- -- -- -- 20 93 % --   09/08/19 0600 (!) 152/52 -- -- 69 15 92 % --   09/08/19 0500 (!) 138/45 -- -- 72 16 91 % --   09/08/19 0400 92/81 98.2 °F (36.8 °C) Oral 65 17 93 % --   09/08/19 0300 (!) 119/44 -- -- 63 18 94 % --   09/08/19 0228 -- -- -- -- -- 90 % --   09/08/19 0216 -- -- -- -- 18 92 % --   09/08/19 0200 (!) 139/51 -- -- 62 19 92 % --   09/08/19 0100 (!) 115/45 -- -- 67 18 93 % --   09/08/19 0000 118/62 98.7 °F (37.1 °C) Oral 64 21 90 % --   09/07/19 2300 134/73 -- -- 99 16 94 % --   09/07/19 2239 -- -- -- -- -- 96 % --   09/07/19 2200 127/69 -- -- 91 17 96 % --   09/07/19 2100 (!) 152/74 -- -- 94 18 96 % --   09/07/19 2009 (!) fibrillation with RVR ([facility] [I48.91] 10/15/2015     Priority: High    Stage 5 chronic kidney disease not on chronic dialysis ([facility] [N18.5] 08/02/2019     Priority: Medium    Anemia of chronic disease [D63.8] 11/19/2018     Priority: Medium    Hyperparathyroidism, secondary renal ([facility] [N25.81] 04/09/2018     Priority: Medium    Diabetes mellitus with renal manifestation ([place] [address].) [E11.29] 03/07/2016     Priority: Medium    Essential hypertension [I10] 01/21/2016     Priority: Medium    CAD (coronary artery disease) [I25.10] 05/26/2015     Priority: Medium       Plan:     Discussed plans with the nursing staff  Protonix drip  Amiodarone drip  Rocephine  Labs in AM    Medications, Laboratories and Imaging results:    Scheduled Meds:   cefTRIAXone (ROCEPHIN) IV  1 g Intravenous Q24H    darbepoetin kyler-polysorbate  40 mcg Subcutaneous Weekly    mometasone-formoterol  2 puff Inhalation BID    cloNIDine  0.1 mg Oral TID    clopidogrel  75 mg Oral Daily    ferrous sulfate  325 mg Oral Daily with breakfast    hydrALAZINE  50 mg Oral TID    ipratropium-albuterol  1 vial Inhalation Q4H    isosorbide mononitrate  30 mg Oral BID    metoprolol succinate  50 mg Oral QAM    sodium chloride flush  10 mL Intravenous 2 times per day    insulin lispro  0-6 Units Subcutaneous TID WC    insulin lispro  0-3 Units Subcutaneous Nightly     Continuous Infusions:   amiodarone 1 mg/min (09/08/19 1003)    Followed by   Nancy Espitia amiodarone      pantoprozole (PROTONIX) infusion      dextrose      bumetanide 0.1 mg/mL infusion Stopped (09/08/19 0443)    diltiazem 15 mg/hr (09/08/19 0936)     PRN Meds:calcium carbonate, glucose, dextrose, glucagon (rDNA), dextrose, LORazepam, sodium chloride flush, ondansetron    Imaging:    Lab Review :    Lab Results   Component Value Date    WBC 15.9 (H) 09/08/2019    HGB 8.6 (L) 09/08/2019    HCT 27.1 (L) 09/08/2019    MCV 98.9 (H) 09/08/2019     09/08/2019     Lab Results   Component

## 2019-09-08 NOTE — PROGRESS NOTES
[facility]    Patient - Elvis Brower,  Age - 80 y.o.    - 9/15/1933      Room Number - 4D-03/003-A   Jordi Funez MD Primary Care Physician - Binh Timmons MD   MRN -  [id number]   [facility]  Date of Admission -  2019  1:33 PM  Hospital Day - 2    Chief Complaint   Palpitations   HPI   80 y. o. male with a past medical history of atrial fibrillation, CAD, CKD 5, HTN, DM, HLD, and PAD who presented to [facility] from an appointment with his cardiologist, Dr. Christine Jain to ER due to AFIB RVR per EKG completed in office. He received metoprolol and eliquis. CXR showed RLL infiltrates and pulmonary edema. He was started on antibiotics and bumex drip but he didn't repsond. He required high flow nasal canula. He was later transferred to the ICU to start HD. Recent ECHO showed EF 55%. Past 24 hours, ROS     Had HD yesterday with removal of 3 liters of IVF, another session was done this am  Oxygen was weaned down to 55%, 30 lpm    Afib is controlled with cardizem and amiodarone drips  Plan for permacath placement     All other systems reviewed  Objective    Vitals    height is 5' 9\" (1.753 m) and weight is 130 lb 15.3 oz (59.4 kg). His oral temperature is 98.2 °F (36.8 °C). His blood pressure is 121/88 and his pulse is 105. His respiration is 15 and oxygen saturation is 96%. O2 Flow Rate (L/min): 30 L/min  I/O    Intake/Output Summary (Last 24 hours) at 2019 1530  Last data filed at 2019 1359  Gross per 24 hour   Intake 1638.12 ml   Output 7025 ml   Net -5386.88 ml     Patient Vitals for the past 96 hrs (Last 3 readings):   Weight   19 1045 130 lb 15.3 oz (59.4 kg)   19 0645 137 lb 9.1 oz (62.4 kg)   19 1654 145 lb 1 oz (65.8 kg)     Exam    Physical Exam    Constitutional: Patient appears thin  Head: Normocephalic and atraumatic.    Mouth/Throat: 5.0 04/26/2018     CBC  Recent Labs     09/06/19  1346 09/07/19  0354 09/08/19  0411 09/08/19  1456   WBC 7.8 12.2* 15.9*  --    RBC 2.75* 2.92* 2.74*  --    HGB 8.8* 9.2* 8.6* 10.1*   HCT 27.5* 29.3* 27.1* 31.9*   .0* 100.3* 98.9*  --    MCH 32.0 31.5 31.4  --    MCHC 32.0* 31.4* 31.7*  --     311 292  --    MPV 9.7 9.4 9.3*  --       BMP  Recent Labs     09/06/19  1346 09/07/19  0353 09/08/19  0026 09/08/19  0411   * 145  --  138   K 4.1 4.7 4.7 4.6    102  --  98   CO2 20* 18*  --  23   BUN 68* 74*  --  55*   CREATININE 6.4* 7.2*  --  5.3*   GLUCOSE 149* 100  --  106   MG  --   --  1.9 1.9   PHOS  --   --  4.9*  --    CALCIUM 9.4 8.8  --  8.6     LFT  Recent Labs     09/06/19  1346   AST 54*   ALT 31   BILITOT 0.8   ALKPHOS 174*     TROP  Lab Results   Component Value Date    TROPONINT 1.010 09/06/2019    TROPONINT 1.040 09/06/2019    TROPONINT 0.866 09/06/2019     BNP  Lab Results   Component Value Date    PROBNP 31358.0 09/08/2019    PROBNP 08616.0 09/07/2019    PROBNP 81427.0 09/06/2019     D-Dimer  Lab Results   Component Value Date    DDIMER 5045.00 04/12/2018     Lactic Acid  Recent Labs     09/06/19  1853   LACTA 3.4*     INR  No results for input(s): INR, PROTIME in the last 72 hours. PTT  No results for input(s): APTT in the last 72 hours. Glucose  Recent Labs     09/07/19 2006 09/08/19  0938 09/08/19  1211   POCGLU 110* 120* 191*     UA No results for input(s): SPECGRAV, PHUR, COLORU, CLARITYU, MUCUS, PROTEINU, BLOODU, RBCUA, WBCUA, BACTERIA, NITRU, GLUCOSEU, BILIRUBINUR, UROBILINOGEN, KETUA, LABCAST, LABCASTTY, AMORPHOS in the last 72 hours. Invalid input(s): CRYSTALS. Echo    Summary   limited echo   Ejection fraction is visually estimated at 50%.  Hypokinetic motion of the apical anteroseptal wall noted in the left   ventricle.   No evidence of any pericardial effusion.   Cultures    Procalcitonin   Lab Results   Component Value Date    PROCAL 0.17 09/06/2019

## 2019-09-08 NOTE — CONSULTS
insufficiency. ALLERGIES:  ZOSYN and CONTRAST. CURRENT MEDICATIONS:  The patient is on Bumex drip, clonidine 0.1 three  times a day, Plavix 75 a day, Eliquis 2.5 twice a day, hydralazine 50  three times a day, insulin, isosorbide 30 a day, metoprolol 50 a day. The patient is currently on Cardizem drip. SOCIAL HISTORY:  No tobacco, no drugs, no alcohol. FAMILY HISTORY:  Noncontributory. PHYSICAL EXAMINATION:  VITAL SIGNS:  Showed a blood pressure of 150/80, heart rate of 130. GENERAL APPEARANCE:  A pleasant gentleman, weak, tired, in no acute  distress. HEENT:  Eyes and ears:  No discharge. NECK:  Moderate JVD. LUNGS:  Decreased air entry with diffuse rhonchi. HEART:  Normal S1 and S2. Systolic murmur grade 2/6. ABDOMEN:  Soft, nontender. Positive bowel sounds. No organomegaly. EXTREMITIES:  Moderate edema. NEUROLOGIC:  Grossly intact. Awake and alert. No focal deficits. PSYCHIATRIC:  No evidence of active psychosis. SKIN:  No rashes. LABORATORY DATA:  Labs showed sodium 138, potassium 4.6, BUN 55,  creatinine 5.3. Troponin 1. BNP [id number]. White count 15.9, hemoglobin  8.6, hematocrit 27.1, platelets 958. DIAGNOSTIC DATA:  EKG showed AFib, diffuse nonspecific ST-T wave  changes. IMPRESSION:  This is a very sick gentleman with heart failure secondary  to his renal failure. The patient has AFib which is difficult to  control. He has been on anticoagulation chronically, and certainly has  a high risk for bleeding with chronically low blood count. RECOMMENDATIONS:  At this point is as follows:  1. Start amiodarone drip. 2.  The patient will have to hold his Eliquis for his tunneling of the  catheter procedure. 3.  I would resume anticoagulation with only Coumadin and baby aspirin  after this is done and close monitoring of the kidney function.   3.  We will rate control for now, and likely, he will convert back to  sinus rhythm with amiodarone since he has been going back
130 pounds, BMI 19.4 kg/m2, temperature 98.1, pulse  64, respiratory rate 23, blood pressure 142/68. GENERAL:  An 80year-old pleasant, chronically ill-appearing male lying  in bed, appears to be in no acute distress. HEENT:  Normocephalic, atraumatic. Pupils are equal, round, reactive to  light. Anicteric sclerae. Pale conjunctivae. No erythema of  oropharynx. The patient had good gag reflex noted. NECK:  Supple. No JVD. No thyromegaly. CHEST:  No axillary or supraclavicular adenopathy. Basilar crackles  posteriorly and inferiorly. HEART:  Irregularly irregular. No S3 or murmurs. ABDOMEN:  Soft. Normoactive bowel sounds. Nontender. No palpable  masses. No appreciable hernias. RECTAL:  Deferred. The patient had black hard stool during my  examination. EXTREMITIES:  Trace edema of the extremities. SKIN:  Multiple ecchymoses noted. NEURO:  Generalized weakness. DIAGNOSTIC DATA:  As in HPI. IMPRESSION:  1. An 80-year-old pleasant male who has coffee-ground emesis, could be  multifactorial.  The patient denied any further vomiting. His  hemoglobin is stable. It could be multifactorial, could be drug-induced  GI bleeding is a possibility, but less likely. I have discussed with  the patient to undergo an EGD. The patient declined to undergo EGD. The patient's daughter is at bedside. She also discussed with the  patient. He is not willing to undergo EGD at this time. 2.  Mild elevation of liver enzymes. The patient had biliary stent  placed on 02/11/2018, that is plastic stent and the stent needs to come  out. I have discussed with the patient's daughter that he is at high  risk for ascending cholangitis because of the long duration of the stent  which is not used for long periods of time. Due to the patient is  unstable, we will hold off any stent removal and evaluation at this  time.   3.  Questionable pancreatic head mass and the patient had CA 19-9, was  45 in

## 2019-09-08 NOTE — PLAN OF CARE
met  Outcome: Ongoing  Note:   Plan is for patient to return back to home, continuing to monitor needs. There is discussion that he may need to be on permanent hemodialysis and placing a permanent catheter. Continuing to assess     Problem: Discharge Planning:  Goal: Participates in care planning  Description  Participates in care planning  Outcome: Ongoing  Note:   Patient has been able to participate in plan of care. He does want to return home at this time  Goal: Discharged to appropriate level of care  Description  Discharged to appropriate level of care  Outcome: Ongoing     Problem: Airway Clearance - Ineffective:  Goal: Ability to maintain a clear airway will improve  Description  Ability to maintain a clear airway will improve  Outcome: Ongoing  Note:   Patient takes his pills with applesauce, head of bed raised when eating and drinking. He has passed his swallow eval and swallowed pills with ease     Problem: Aspiration:  Goal: Absence of aspiration  Description  Absence of aspiration  Outcome: Ongoing  Note:   Head of bed is elevated when eating, drinking and swallowing pills. Pills are taken with applesauce     Problem: Bowel Function - Altered:  Goal: Bowel elimination is within specified parameters  Description  Bowel elimination is within specified parameters  Outcome: Ongoing  Note:   Patient has active bowel sounds and has had a few small bowel movements today     Problem: Fluid Volume - Imbalance:  Goal: Absence of imbalanced fluid volume signs and symptoms  Description  Absence of imbalanced fluid volume signs and symptoms  Outcome: Ongoing  Note:   Patient is on a bumex gtt currently to help with increased fluids. Lungs have crackles in bases and expiratory wheezes in the upper lobes     Problem: Gas Exchange - Impaired:  Goal: Levels of oxygenation will improve  Description  Levels of oxygenation will improve  Outcome: Ongoing  Note:   Patient is currently on heated high flow.  He does not wear oxygen at home so continuing to monitor     Problem: Skin Integrity - Impaired:  Goal: Absence of new skin breakdown  Description  Absence of new skin breakdown  Outcome: Ongoing  Note:   No new signs of skin breakdown. He does have scattered bruising throughout     Problem: Sleep Pattern Disturbance:  Goal: Appears well-rested  Description  Appears well-rested  Outcome: Ongoing  Note:   Patient has appeared well rested. He takes frequent naps and has expressed he does feel tired     Problem: Metabolic:  Goal: Ability to maintain appropriate glucose levels will improve  Description  Ability to maintain appropriate glucose levels will improve  Outcome: Ongoing  Note:   Patients blood sugar this evening was 110. Did not need to cover with night time insulin     Problem: Nutritional:  Goal: Maintenance of adequate nutrition will improve  Description  Maintenance of adequate nutrition will improve  Outcome: Ongoing  Note:   Patient has not had much of an appetite this evening. He took a few bites of his dinner but did not eat more than 25% of his meal. Consider a dietary consult   Care plan reviewed with patient and wife. Patient and wife verbalize understanding of the plan of care and contribute to goal setting.

## 2019-09-09 NOTE — PROGRESS NOTES
Pharmacy Renal Adjustment    Tania Foy is a 80 y.o. male. Pharmacy renally adjust the following medications per P&T approved policy: fluconazole     Recent Labs     09/08/19  0411 09/09/19  0544   BUN 55* 44*       Recent Labs     09/08/19  0411 09/09/19  0544   CREATININE 5.3* 4.3*       Estimated Creatinine Clearance: 11 mL/min (A) (based on SCr of 4.3 mg/dL [facility] Calculated CrCl:    Height:   Ht Readings from Last 1 Encounters:   09/06/19 5' 9\" (1.753 m)     Weight:  Wt Readings from Last 1 Encounters:   09/08/19 130 lb 15.3 oz (59.4 kg)       CKD stage: 5 requiring HD        Plan: Adjustments based on renal function:          Decrease fluconazole 400 mg daily to 200 mg daily.     Jessica Martin, PharmD  9/9/2019  7:30 AM

## 2019-09-09 NOTE — PROGRESS NOTES
Palliative care eval received to assist with family support. Dr Wicho Peralta note reviewed, case discussed with him. Writer in to see family. Pt's wife and one daughter were just exiting room. Palliative care introduced, supportive conversation regarding pt condition and plan of care. Family expresses understanding of pt condition, pt's wife states that pt will likely not do well but they want to give him a chance. Pt's other daughter, Reny Lopez, is in the room with a friend. Writer spoke with her, she is tearful, very frightened to lose her dad. States that she lost her  to cancer 5 years ago and she is fearful to lose her dad too. Much emotional support given to all family, writer encouraged them to take it hour by hour and day by day. No further needs at this time. Palliative care will continue to follow supportively.

## 2019-09-09 NOTE — PLAN OF CARE
has been started on senna maria t every 4 hours for constipation. He has not had a bowel movement for me this shift     Problem: Fluid Volume - Imbalance:  Goal: Absence of imbalanced fluid volume signs and symptoms  Description  Absence of imbalanced fluid volume signs and symptoms  9/9/2019 0238 by Clair Falcon RN  Outcome: Ongoing  Note:   Patient has had less than 100 ml of urine out in the last 8 hours. Nephrology is aware of the decrease in urine. He has had hemodialysis the last two days to help with fluid irregularities. Problem: Gas Exchange - Impaired:  Goal: Levels of oxygenation will improve  Description  Levels of oxygenation will improve  9/9/2019 0238 by Clair Falcon RN  Outcome: Ongoing  Note:   Currently working on weaning down the FiO2. Problem: Sleep Pattern Disturbance:  Goal: Appears well-rested  Description  Appears well-rested  9/9/2019 0238 by Clair Falcon RN  Outcome: Ongoing  Note:   Patient has been sleeping most of my shift. He has had a lot of changes in the last few days and many visitors. Problem: Metabolic:  Goal: Ability to maintain appropriate glucose levels will improve  Description  Ability to maintain appropriate glucose levels will improve  9/9/2019 0238 by Clair Falcon RN  Outcome: Ongoing  Note:   Patient's blood sugars have been slightly elevated. Had to cover his sugars this evening with one unit of insulin     Problem: Nutritional:  Goal: Maintenance of adequate nutrition will improve  Description  Maintenance of adequate nutrition will improve  Outcome: Ongoing  Note:   Patient has not had much of an appetite this shift. He has not been finishing meals and not had much of an appetite. Care plan reviewed with patient and family. Patient and family verbalize understanding of the plan of care and contribute to goal setting.

## 2019-09-09 NOTE — PROGRESS NOTES
Assessment and Plan:        1. Acute hypoxemic respiratory failure: Patient requiring high flow oxygen. Primarily secondary to right middle lobe/right lower lobe pneumonia. Continue with supplemental oxygen. As needed bronchodilator therapy. 2. Right middle lobe pneumonia: Initially treated with Levaquin in the outpatient setting. This has adequate coverage for atypical pathogens. Patient on Rocephin. Add clindamycin for gram-positive cocci and anaerobic coverage. Add Diflucan for ended albicans growing from the sputum. 3. End-stage renal disease: Requiring dialysis. 4. Biliary stent: Placed in 2018. Needs eventual extraction. Now with increasing liver enzymes. Hepatitis B panel is negative. Obtain noncontrasted CT scan of abdomen and pelvis. 5. 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 No CT scan of abdomen and pelvis since 2018. Repeat CT scan. 6. Atrial fibrillation: Rate control with amiodarone and as needed Cardizem. Anticoagulate with heparin. 7. Coronary artery disease: Known multivessel coronary artery disease. No stent double process. Not a surgical candidate. Medical management only. On Plavix, and metoprolol. Continue with isosorbide mononitrate. 8. Type 2 diabetes mellitus: Sliding scale insulin  9. Peripheral vascular disease: Previously on Plavix and Eliquis. Currently on heparin drip and Plavix. 10. Hyperlipidemia: Currently not on statin therapy. 11. Hypertension: On metoprolol. On clonidine. 12. Iron deficiency anemia: On iron supplementation and erythropoietin therapy. 13. Elevated liver enzymes: Awating ultrasound of liver and gallbladder. 14. Acute gastritis: Proton pump inhibitor. 15. COPD: On Spiriva and Symbicort. CC: Right middle lobe pneumonia  HPI: Patient is an 80-year-old white male reformed smoker. Patient had an ERCP/11/18 who have stone and had subsequent stent placement.   He has an extensive past medical history including iron deficiency anemia, atrial fibrillation, BPH, end-stage renal disease, coronary artery disease, chronic hearing loss, hyperlipidemia, hypertension, peripheral vascular disease, and type 2 diabetes mellitus. Atrial fibrillation was treated with FRANCO guided cardioversion with nonsustained sinus rhythm. Patient had non-STEMI associated with diffuse ST segment depression and underwent cardiac catheterization 7/12/2019. This demonstrated severe multivessel disease including mid segment 100% occlusion of the LAD in addition to 90% occlusion distally. Patient was referred to the emergency room from the cardiologist's office for assessment of atrial fibrillation with rapid ventricular response. Heart rate was 114. He had no associated chest pain or nausea or vomiting. He did have increasing cough and increasing shortness of breath. Patient was on Levaquin for diagnosis of bronchitis. Chest x-ray demonstrated a right middle lobe infiltrate and the patient required 6 L oxygen. Additionally he had signs of extracellular volume overload with lower extremity edema. Patient was started on a Bumex drip. Atrial fibrillation was treated with Cardizem drip and amiodarone. Was maintained on Levaquin despite progressing from bronchitis to pneumonia while on Levaquin. Patient did not respond to diuretics and had ongoing issues with hypoxemia and was transferred to the intensive care unit on 9/7/2019 and started on hemodialysis. Antibiotics were converted to Rocephin. He developed coffee-ground emesis and gastroenterology was consulted. Patient is identified as having a retained biliary stent that was placed in 2018. There was some concern regarding pancreatic cancer and a CA 19-9 was requested. For further details, please review the assessment and plan. ROS: No nausea. No shortness of breath. Positive wheezing. No abdominal pain. No chest pain. PMH:  Per HPI  SHX: Reformer smoker with a 50-pack-year history.   FHX: Positive for cerebrovascular disease and heart disease  Allergies: Zosyn and contrast  Medications:     amiodarone 0.5 mg/min (09/09/19 0346)    pantoprozole (PROTONIX) infusion 8 mg/hr (09/08/19 1956)    heparin (porcine) 15 Units/kg/hr (09/09/19 0133)    dextrose      bumetanide 0.1 mg/mL infusion Stopped (09/08/19 0443)    diltiazem Stopped (09/08/19 1216)      tiotropium  18 mcg Inhalation Daily    senna  1 tablet Oral BID    senna  2 tablet Oral Q4H    cefTRIAXone (ROCEPHIN) IV  1 g Intravenous Q24H    darbepoetin kyler-polysorbate  40 mcg Subcutaneous Weekly    mometasone-formoterol  2 puff Inhalation BID    cloNIDine  0.1 mg Oral TID    clopidogrel  75 mg Oral Daily    ferrous sulfate  325 mg Oral Daily with breakfast    hydrALAZINE  50 mg Oral TID    isosorbide mononitrate  30 mg Oral BID    metoprolol succinate  50 mg Oral QAM    sodium chloride flush  10 mL Intravenous 2 times per day    insulin lispro  0-6 Units Subcutaneous TID WC    insulin lispro  0-3 Units Subcutaneous Nightly       Vital Signs: T: 98: P: 112 RR: 20 B/P: 122/51: FiO2: 55%: O2 Sat: 95%: I/O: 3397/2276 with 175 ml urine output. CAM-ICU:  Negative  General:   Acutely ill-appearing on chronically ill and frail appearing white male  HEENT:  normocephalic and atraumatic. No scleral icterus. PERR  Neck: Stiff. No Thyromegaly. Lungs: Right lower lobe rhonchi and wheeze. No retractions  Cardiac: Irregular rhythm. No JVD. Abdomen: soft. Nontender. Positive bowel sounds. Extremities:  No clubbing, cyanosis, or edema x 4. Vasculature: capillary refill < 3 seconds. Palpable dorsalis pedis pulses. Skin:  warm and dry. Psych: Arousable. Withdrawn. Lymph:  No supraclavicular adenopathy. Neurologic:  No focal deficit. No seizures. Generalized muscle atrophy. Data: (All radiographs, tracings, PFTs, and imaging are personally viewed and interpreted unless otherwise noted).     Sputum culture growing Candida

## 2019-09-09 NOTE — PROGRESS NOTES
complaints. However since then, he decompensated into respiratory distress and now is on mechanical support. Blood pressure is still running low. Updated by the staff. Medications:   Scheduled Meds:   tiotropium  18 mcg Inhalation Daily    clindamycin (CLEOCIN) IV  600 mg Intravenous Q8H    pantoprazole  40 mg Intravenous Daily    fluconazole  200 mg Oral Daily    metoprolol tartrate  12.5 mg Oral Q6H    ketamine  100 mg Intravenous Once    propofol        atropine        EPINEPHrine        dexamethasone  4 mg Intravenous Once    cosyntropin  250 mcg Intravenous Once    senna  2 tablet Oral Q4H    cefTRIAXone (ROCEPHIN) IV  1 g Intravenous Q24H    darbepoetin kyler-polysorbate  40 mcg Subcutaneous Weekly    mometasone-formoterol  2 puff Inhalation BID    cloNIDine  0.1 mg Oral TID    clopidogrel  75 mg Oral Daily    ferrous sulfate  325 mg Oral Daily with breakfast    [Held by provider] hydrALAZINE  50 mg Oral TID    [Held by provider] isosorbide mononitrate  30 mg Oral BID    sodium chloride flush  10 mL Intravenous 2 times per day    insulin lispro  0-6 Units Subcutaneous TID WC    insulin lispro  0-3 Units Subcutaneous Nightly     Continuous Infusions:   propofol 10 mcg/kg/min (09/09/19 1107)    fentaNYL (SUBLIMAZE) 500 mcg in sodium chloride 0.9 % 100 mL      EPINEPHrine infusion 17 mcg/min (09/09/19 1328)    DOPamine 20 mcg/kg/min (09/09/19 1446)    vasopressin (Septic Shock) infusion      amiodarone 0.5 mg/min (09/09/19 0346)    heparin (porcine) 17 Units/kg/hr (09/09/19 1203)    dextrose      bumetanide 0.1 mg/mL infusion Stopped (09/08/19 0443)    diltiazem 5 mg/hr (09/09/19 0738)       CBC:   Recent Labs     09/08/19  0411  09/08/19  1625 09/09/19  0046 09/09/19  0544 09/09/19  1203   WBC 15.9*  --  20.5*  --  17.0*  --    HGB 8.6*   < > 10.1* 10.2* 9.1* 10.1*     --  339  --  321  --     < > = values in this interval not displayed.      CMP:    Recent Labs

## 2019-09-09 NOTE — CARE COORDINATION
9/9/19, 9:26 AM      Irineo Franks       Admitted from: ED 9/6/2019/ [address] day: 3   Location: -03/003-A Reason for admit: A-fib ([address] [I48.91]  A-fib ([address] [I48.91] Status: IP  Admit order signed?: yes  PMH:  has a past medical history of Anemia, iron deficiency, Arthritis, Atonic bladder, Atrial fibrillation ([address] BPH (benign prostatic hyperplasia), Bronchitis, CAD (coronary artery disease), Chronic kidney disease requiring chronic dialysis ([address] CKD (chronic kidney disease) stage 3, GFR 30-59 ml/min ([address] Gallstones, Hard of hearing, Hyperlipidemia, Hypertension, Melanoma ([address] PAD (peripheral artery disease) ([address] Proteinuria, Renal insufficiency, and Type II or unspecified type diabetes mellitus without mention of complication, not stated as uncontrolled. Procedure:   9/6 CXR: Mild cardiomegaly. Airspace consolidation in the right lung base and minimally in the left lung base.  Findings concerning for pneumonia  9/7 TESSIO RIJ in IR  9/8 CVC Left subclavian  9/9 Code Blue: Unresponsive at end of HD RVR to SB 36  9/9 Intubated  9/9 Code Blue: bradycardia to PEA  9/9 CXR: Worsening bibasilar atelectasis and/or pneumonia; Persistent interstitial pulmonary edema versus atypical pneumonia  Medications:  Scheduled Meds:   tiotropium  18 mcg Inhalation Daily    clindamycin (CLEOCIN) IV  600 mg Intravenous Q8H    pantoprazole  40 mg Intravenous Daily    fluconazole  200 mg Oral Daily    senna  1 tablet Oral BID    senna  2 tablet Oral Q4H    cefTRIAXone (ROCEPHIN) IV  1 g Intravenous Q24H    darbepoetin kyler-polysorbate  40 mcg Subcutaneous Weekly    mometasone-formoterol  2 puff Inhalation BID    cloNIDine  0.1 mg Oral TID    clopidogrel  75 mg Oral Daily    ferrous sulfate  325 mg Oral Daily with breakfast    hydrALAZINE  50 mg Oral TID    isosorbide mononitrate  30 mg Oral BID    metoprolol succinate  50 mg Oral QAM    sodium chloride flush  10 mL Intravenous 2 times per day    2.7, AG 23 - now 29, LA 2.2 - then 3.4, phos 4.9 -now 7.2, procal 0.17, pro-bnp 64833, now 91544. Trop 0.866 - up to 1.040 - now 1.010. Alb 3.3, alk phos 174 - up to 245, alt 31 - now 622, ast 54 - now 510, bili 0.8 - now 1.5, direct bili 0.4, wbc 7.8 - up to 20.5 - now 17, hgb 8.8 - up to 10.1. Ferritin 5832, iron sat 67. Blood cultures sent. Smoking status:  reports that he quit smoking about 40 years ago. His smoking use included cigarettes. He has a 50.00 pack-year smoking history. He has never used smokeless tobacco.   PCP: Venus Zhang MD  Readmission: no  Readmission Risk Score: 29%    Discharge Planning  Current Residence:  Private Residence  Living Arrangements:  Spouse/Significant Other, Children(wife and daughter)   Support Systems:  Spouse/Significant Other, Children, Family Members  Current Services PTA:     Potential Assistance Needed:  Home Care  Potential Assistance Purchasing Medications:  No  Does patient want to participate in local refill/ meds to beds program?  Not Assessed  Type of Home Care Services:  PT, OT, Nursing Services  Patient expects to be discharged to:  home with wife and daughter  Expected Discharge date:  09/09/19  Follow Up Appointment: Best Day/ Time: Tuesday AM    Discharge Plan: Not appropriate for interview at this time. Patient has coded 2 times. Continue to monitor as course progresses.       Evaluation: no

## 2019-09-10 ENCOUNTER — HOSPITAL ENCOUNTER (OUTPATIENT)
Dept: NURSING | Age: 84
End: 2019-09-10
Payer: MEDICARE

## 2019-09-12 LAB
BLOOD CULTURE, ROUTINE: NORMAL
BLOOD CULTURE, ROUTINE: NORMAL

## 2020-01-21 NOTE — FLOWSHEET NOTE
Patient asleep; offered prayer at bedside       09/08/19 0889   Encounter Summary   Services provided to: Patient   Referral/Consult From: Rounding   Continue Visiting Yes  (9/8 asleep)   Complexity of Encounter Low   Length of Encounter 15 minutes 1 person assist

## 2021-10-17 NOTE — CARE COORDINATION
Family Members  Current Services PTA:     Potential Assistance Needed:  N/A  Potential Assistance Purchasing Medications:  No  Does patient want to participate in local refill/ meds to beds program?  Yes  Type of Home Care Services:  None  Patient expects to be discharged to:  home with wife and children  Expected Discharge date:  07/18/19  Follow Up Appointment: Best Day/ Time: Monday PM    Discharge Plan: met with client, denied [place] as plans home with spouse when medically cleared     Evaluation: no    7/15/19, 2:19 PM    Discharge plan discussed by  and . Discharge plan reviewed with patient/ family. Patient/ family verbalize understanding of discharge plan and are in agreement with plan. Understanding was demonstrated using the teach back method.        IMM Letter  IMM Letter given to Patient/Family/Significant other/Guardian/POA/by[de-identified]   IMM Letter date given[de-identified] 07/15/19  IMM Letter time given[de-identified] 8188
Applied

## (undated) DEVICE — CONTAINER,SPECIMEN,PNEU TUBE,4OZ,OR STRL: Brand: MEDLINE

## (undated) DEVICE — TUBING, SUCTION, 1/4" X 20', STRAIGHT: Brand: MEDLINE INDUSTRIES, INC.

## (undated) DEVICE — HYDROTOME RX44 30MM X 260CM

## (undated) DEVICE — TUBING IV STOPCOCK 48 CM 3 W

## (undated) DEVICE — YANKAUER,BULB TIP,W/O VENT,RIGID,STERILE: Brand: MEDLINE

## (undated) DEVICE — RETRIEVAL BALLOON CATHETER: Brand: EXTRACTOR™ PRO RX

## (undated) DEVICE — DEVICE LCK BILI RAP EXCHG W/O BX CAP

## (undated) DEVICE — CONTRAST IOTHALAMATE MEGLUMINE 60% 50 ML INJ CONRAY 60

## (undated) DEVICE — CANISTER, RIGID, 2000CC: Brand: MEDLINE INDUSTRIES, INC.